# Patient Record
Sex: MALE | Race: WHITE | HISPANIC OR LATINO
[De-identification: names, ages, dates, MRNs, and addresses within clinical notes are randomized per-mention and may not be internally consistent; named-entity substitution may affect disease eponyms.]

---

## 2017-01-12 ENCOUNTER — RX RENEWAL (OUTPATIENT)
Age: 22
End: 2017-01-12

## 2017-04-16 ENCOUNTER — INPATIENT (INPATIENT)
Facility: HOSPITAL | Age: 22
LOS: 1 days | Discharge: HOME | DRG: 202 | End: 2017-04-18
Attending: INTERNAL MEDICINE | Admitting: HOSPITALIST
Payer: COMMERCIAL

## 2017-04-16 VITALS
HEIGHT: 66 IN | DIASTOLIC BLOOD PRESSURE: 75 MMHG | TEMPERATURE: 98 F | WEIGHT: 190.04 LBS | OXYGEN SATURATION: 93 % | SYSTOLIC BLOOD PRESSURE: 140 MMHG | RESPIRATION RATE: 26 BRPM | HEART RATE: 105 BPM

## 2017-04-16 DIAGNOSIS — J45.42 MODERATE PERSISTENT ASTHMA WITH STATUS ASTHMATICUS: ICD-10-CM

## 2017-04-16 DIAGNOSIS — J45.901 UNSPECIFIED ASTHMA WITH (ACUTE) EXACERBATION: ICD-10-CM

## 2017-04-16 DIAGNOSIS — J18.9 PNEUMONIA, UNSPECIFIED ORGANISM: ICD-10-CM

## 2017-04-16 DIAGNOSIS — Z29.9 ENCOUNTER FOR PROPHYLACTIC MEASURES, UNSPECIFIED: ICD-10-CM

## 2017-04-16 DIAGNOSIS — J45.902 UNSPECIFIED ASTHMA WITH STATUS ASTHMATICUS: ICD-10-CM

## 2017-04-16 DIAGNOSIS — E87.2 ACIDOSIS: ICD-10-CM

## 2017-04-16 LAB
ANION GAP SERPL CALC-SCNC: 11 MMOL/L — SIGNIFICANT CHANGE UP (ref 5–17)
BASOPHILS NFR BLD AUTO: 1 % — SIGNIFICANT CHANGE UP (ref 0–2)
BUN SERPL-MCNC: 16 MG/DL — SIGNIFICANT CHANGE UP (ref 7–23)
CALCIUM SERPL-MCNC: 9.3 MG/DL — SIGNIFICANT CHANGE UP (ref 8.5–10.1)
CHLORIDE SERPL-SCNC: 105 MMOL/L — SIGNIFICANT CHANGE UP (ref 96–108)
CO2 SERPL-SCNC: 24 MMOL/L — SIGNIFICANT CHANGE UP (ref 22–31)
CREAT SERPL-MCNC: 1.2 MG/DL — SIGNIFICANT CHANGE UP (ref 0.5–1.3)
GLUCOSE SERPL-MCNC: 117 MG/DL — HIGH (ref 70–99)
HCT VFR BLD CALC: 49 % — SIGNIFICANT CHANGE UP (ref 39–50)
HGB BLD-MCNC: 15.5 G/DL — SIGNIFICANT CHANGE UP (ref 13–17)
LACTATE SERPL-SCNC: 2.9 MMOL/L — HIGH (ref 0.7–2)
LACTATE SERPL-SCNC: 3.1 MMOL/L — HIGH (ref 0.7–2)
LYMPHOCYTES # BLD AUTO: 5 % — LOW (ref 13–44)
MCHC RBC-ENTMCNC: 28.9 PG — SIGNIFICANT CHANGE UP (ref 27–34)
MCHC RBC-ENTMCNC: 31.7 GM/DL — LOW (ref 32–36)
MCV RBC AUTO: 91.1 FL — SIGNIFICANT CHANGE UP (ref 80–100)
MONOCYTES NFR BLD AUTO: 1 % — SIGNIFICANT CHANGE UP (ref 1–9)
NEUTROPHILS NFR BLD AUTO: 89 % — HIGH (ref 43–77)
PLATELET # BLD AUTO: 214 K/UL — SIGNIFICANT CHANGE UP (ref 150–400)
POTASSIUM SERPL-MCNC: 4 MMOL/L — SIGNIFICANT CHANGE UP (ref 3.5–5.3)
POTASSIUM SERPL-SCNC: 4 MMOL/L — SIGNIFICANT CHANGE UP (ref 3.5–5.3)
RBC # BLD: 5.38 M/UL — SIGNIFICANT CHANGE UP (ref 4.2–5.8)
RBC # FLD: 14 % — SIGNIFICANT CHANGE UP (ref 10.3–14.5)
SODIUM SERPL-SCNC: 140 MMOL/L — SIGNIFICANT CHANGE UP (ref 135–145)
WBC # BLD: 15.6 K/UL — HIGH (ref 3.8–10.5)
WBC # FLD AUTO: 15.6 K/UL — HIGH (ref 3.8–10.5)

## 2017-04-16 PROCEDURE — 99223 1ST HOSP IP/OBS HIGH 75: CPT | Mod: AI

## 2017-04-16 PROCEDURE — 71020: CPT | Mod: 26

## 2017-04-16 PROCEDURE — 99285 EMERGENCY DEPT VISIT HI MDM: CPT

## 2017-04-16 RX ORDER — IPRATROPIUM/ALBUTEROL SULFATE 18-103MCG
3 AEROSOL WITH ADAPTER (GRAM) INHALATION ONCE
Qty: 0 | Refills: 0 | Status: COMPLETED | OUTPATIENT
Start: 2017-04-16 | End: 2017-04-16

## 2017-04-16 RX ORDER — AZITHROMYCIN 500 MG/1
500 TABLET, FILM COATED ORAL ONCE
Qty: 0 | Refills: 0 | Status: COMPLETED | OUTPATIENT
Start: 2017-04-16 | End: 2017-04-16

## 2017-04-16 RX ORDER — CEFTRIAXONE 500 MG/1
1 INJECTION, POWDER, FOR SOLUTION INTRAMUSCULAR; INTRAVENOUS ONCE
Qty: 0 | Refills: 0 | Status: COMPLETED | OUTPATIENT
Start: 2017-04-16 | End: 2017-04-16

## 2017-04-16 RX ORDER — MAGNESIUM SULFATE 500 MG/ML
2 VIAL (ML) INJECTION ONCE
Qty: 0 | Refills: 0 | Status: COMPLETED | OUTPATIENT
Start: 2017-04-16 | End: 2017-04-16

## 2017-04-16 RX ORDER — ALBUTEROL 90 UG/1
2.5 AEROSOL, METERED ORAL
Qty: 0 | Refills: 0 | Status: DISCONTINUED | OUTPATIENT
Start: 2017-04-16 | End: 2017-04-18

## 2017-04-16 RX ORDER — SODIUM CHLORIDE 9 MG/ML
1000 INJECTION INTRAMUSCULAR; INTRAVENOUS; SUBCUTANEOUS ONCE
Qty: 0 | Refills: 0 | Status: COMPLETED | OUTPATIENT
Start: 2017-04-16 | End: 2017-04-16

## 2017-04-16 RX ORDER — ALBUTEROL 90 UG/1
1 AEROSOL, METERED ORAL EVERY 4 HOURS
Qty: 0 | Refills: 0 | Status: DISCONTINUED | OUTPATIENT
Start: 2017-04-16 | End: 2017-04-18

## 2017-04-16 RX ORDER — ALBUTEROL 90 UG/1
2.5 AEROSOL, METERED ORAL EVERY 6 HOURS
Qty: 0 | Refills: 0 | Status: DISCONTINUED | OUTPATIENT
Start: 2017-04-16 | End: 2017-04-18

## 2017-04-16 RX ADMIN — Medication 3 MILLILITER(S): at 18:37

## 2017-04-16 RX ADMIN — CEFTRIAXONE 100 GRAM(S): 500 INJECTION, POWDER, FOR SOLUTION INTRAMUSCULAR; INTRAVENOUS at 19:30

## 2017-04-16 RX ADMIN — Medication 125 MILLIGRAM(S): at 19:41

## 2017-04-16 RX ADMIN — Medication 50 GRAM(S): at 19:42

## 2017-04-16 RX ADMIN — AZITHROMYCIN 255 MILLIGRAM(S): 500 TABLET, FILM COATED ORAL at 20:00

## 2017-04-16 RX ADMIN — SODIUM CHLORIDE 1000 MILLILITER(S): 9 INJECTION INTRAMUSCULAR; INTRAVENOUS; SUBCUTANEOUS at 18:49

## 2017-04-16 NOTE — ED PROVIDER NOTE - OBJECTIVE STATEMENT
pt A 22 y/o man with a history of asthma (No history of hospitalizations nor intubations) presents with a  nonproductive cough, fever, wheezing and dyspnea x 1 day. used rescue inhaler pta. + fever. No nausea/vomiting. No chest pain. No abdominal pain. No back pain. No headache. No neck pain. No weakness/dizziness/numbness/tingling. No syncope. No leg pain/swelling. No prolonged travel, No recent hospitalizations/surgeries, No periods of prolonged immobilization, No hx of PE/DVT, No family history of PE/DVT/bleeding disorders. Denies all other risk factors for PE as well. No other complaints.

## 2017-04-16 NOTE — H&P ADULT - NSHPREVIEWOFSYSTEMS_GEN_ALL_CORE
Constitutional: denies fever, chills, general malaise, weight loss, weight gain, diaphoresis   HEENT: denies dry mouth, sore throat, runny nose, photophobia, blurry vision, double vision, discharge, eye pain, difficulty hearing, vertigo, dysphagia, epistaxis, recent dental work    Respiratory: HPI  Cardiovascular: denies CP, palpitations, edema  Gastrointestinal: denies nausea, vomiting, diarrhea, constipation, abdominal pain, melena, hematochezia   Genitourinary: denies dysuria, frequency, urgency, incontinence, hematuria   Skin/Breast: denies rash, hives, itching, masses, hair loss   Musculoskeletal: denies myalgias, arthritis, joint swelling, muscle weakness  Neurologic: denies syncope, LOC, headache, weakness, dizziness, paresthesias, numbness, seizures, confusion, dementia   Psychiatric: denies feeling anxious, depressed, suicidal, homicidal thoughts  Endocrine: denies increased fingerstick glucoses, cold or heat intolerance, polydipsia, polyuria, polyphagia   Hematology/Oncology: denies bruising, tender or enlarged lymph nodes

## 2017-04-16 NOTE — H&P ADULT - NSHPPHYSICALEXAM_GEN_ALL_CORE
General: Well developed, well nourished, tachypneic  HEENT: NCAT, PERRLA, EOMI bl, moist mucous membranes   Neck: Supple, nontender, no mass  Neurology: A&Ox3, nonfocal, CN II-XII grossly intact, sensation intact, no gait abnormalities   Respiratory: scattered wheezing most prominent bilat apices and RML, equal chest expansion no accessory muscle use   CV: Tachy +S1/S2, no murmurs, rubs or gallops  Abdominal: Soft, NT, ND +BSx4  Extremities: No C/C/E, + peripheral pulses  MSK: Normal ROM, no joint erythema or warmth, no joint swelling   Skin: warm, dry, normal color, no rash or abnormal lesions

## 2017-04-16 NOTE — ED PROVIDER NOTE - CHIEF COMPLAINT
The patient is a 21y Male complaining of asthma exacerbation. The patient is a 21 year old man presents with an asthma exacerbation.

## 2017-04-16 NOTE — H&P ADULT - ASSESSMENT
22 YO male with presumed mild intermittent asthma presents with asthma exacerbation with probable concomittant pneumonia. Accepted to ICU.

## 2017-04-16 NOTE — H&P ADULT - NSHPLABSRESULTS_GEN_ALL_CORE
15.5   15.6  )-----------( 214 ( 16 Apr 2017 18:57 )             49.0       04-16    140  |  105  |  16  ----------------------------<  117<H>  4.0   |  24  |  1.20    Ca    9.3      16 Apr 2017 18:57              Lactate Trend  04-16 @ 18:57 Lactate:3.1       prelim Xray PA Lat: [facility] airspace disease       CAPILLARY BLOOD GLUCOSE                              15.5   15.6  )-----------( 214      ( 16 Apr 2017 18:57 )             49.0       04-16    140  |  105  |  16  ----------------------------<  117<H>  4.0   |  24  |  1.20    Ca    9.3      16 Apr 2017 18:57                        Lactate Trend  04-16 @ 18:57 Lactate:3.1             CAPILLARY BLOOD GLUCOSE

## 2017-04-16 NOTE — PROGRESS NOTE ADULT - SUBJECTIVE AND OBJECTIVE BOX
Patient is a 21y old  Male who presents with a chief complaint of     BRIEF HOSPITAL COURSE: 21 year old male PMHx Asthma and seasonal allergies.  Never hospitalized for asthma exacerbation.  Maintained on Advair, Proventil and Allegra.  Followed by Pulmonary - [place] [facility] Dr Tyson.  Presents to ED with Asthma exacerbation which started last night. Reports URI symptoms the day prior.  No other complaints.    Events last 24 hours: Some improvement of symptoms in Ed after high dose of steroid and multiple Nebs and IV Magnesium.  Rx Status asthmaticus will  admit and follow in ICU setting.    PAST MEDICAL & SURGICAL HISTORY:  Eczema  Asthma  No pertinent past medical history  No significant past surgical history      Review of Systems:  CONSTITUTIONAL: No fever, chills, or fatigue  EYES: No eye pain, visual disturbances, or discharge  ENMT:  No difficulty hearing, tinnitus, vertigo; No sinus or throat pain  NECK: No pain or stiffness  RESPIRATORY: No cough, (+) wheezing, No chills or hemoptysis; (+)  shortness of breath  CARDIOVASCULAR: No chest pain, palpitations, dizziness, or leg swelling  GASTROINTESTINAL: No abdominal or epigastric pain. No nausea, vomiting, or hematemesis; No diarrhea or constipation. No melena or hematochezia.  GENITOURINARY: No dysuria, frequency, hematuria, or incontinence  NEUROLOGICAL: No headaches, memory loss, loss of strength, numbness, or tremors  SKIN: No itching, burning, rashes, or lesions   MUSCULOSKELETAL: No joint pain or swelling; No muscle, back, or extremity pain  PSYCHIATRIC: No depression, anxiety, mood swings, or difficulty sleeping      Medications:  ALBUTerol    0.083% 2.5milliGRAM(s) Nebulizer every 2 hours PRN  ALBUTerol    90 MICROgram(s) HFA Inhaler 1Puff(s) Inhalation every 4 hours  methylPREDNISolone sodium succinate Injectable 60milliGRAM(s) IV Push every 6 hours          ICU Vital Signs Last 24 Hrs  T(C): 36.7, Max: 37.3 (04-16 @ 18:44)  T(F): 98, Max: 99.2 (04-16 @ 18:44)  HR: 95 (95 - 106)  BP: 128/80 (128/80 - 140/75)  BP(mean): --  ABP: --  ABP(mean): --  RR: 25 (24 - 26)  SpO2: 95% (93% - 97%)          I&O's Detail        LABS:                        15.5   15.6  )-----------( 214      ( 16 Apr 2017 18:57 )             49.0     04-16    140  |  105  |  16  ----------------------------<  117<H>  4.0   |  24  |  1.20    Ca    9.3      16 Apr 2017 18:57            CAPILLARY BLOOD GLUCOSE        CULTURES:      Physical Examination:    General: Minimal Respiratory distress.  Alert, oriented, interactive, nonfocal    HEENT: Pupils equal, reactive to light.  Symmetric. No JVD, No Rhinorrhea.     PULM: Decreased BS tight sounding with Ok air movement,  + Exp Wheeze R > L,  no significant sputum production    CVS: Regular rate and rhythm, no murmurs, rubs, or gallops    ABD: Soft, nondistended, nontender, normoactive bowel sounds, no masses    EXT: No edema, nontender    SKIN: Warm and well perfused, no rashes noted.    RADIOLOGY:  Clear, No effusion, No consolidations - official read pending       CRITICAL CARE TIME SPENT: 45 minutes

## 2017-04-16 NOTE — H&P ADULT - NSHPSOCIALHISTORY_GEN_ALL_CORE
Patient is adopted  Denies tobacco, alcohol or illicit drug use  Communications major at [employer]

## 2017-04-16 NOTE — ED ADULT NURSE REASSESSMENT NOTE - NS ED NURSE REASSESS COMMENT FT1
received report brandon rodriguez.  patient speaking in 3 word sentences,  retractions observed.  neb tx continued.  md made aware of patient condition.

## 2017-04-16 NOTE — ED PROVIDER NOTE - MEDICAL DECISION MAKING DETAILS
patient Patient received continuous duonebs, Solumedrol, Magnesium Sulfate with mild improvement in the ER. Status asthmaticus with possible pneumonia. Patient is still able to speak in full sentences and is comfortable but still diffusely wheezing. ICU and hospitalist aware. Patient accepted for admission to ICU.

## 2017-04-16 NOTE — H&P ADULT - ATTENDING COMMENTS
patient seen with both parents at bedside. All acknowledging plan and voiced agreement to aforementioned plan.

## 2017-04-16 NOTE — H&P ADULT - HISTORY OF PRESENT ILLNESS
22 YO male PMH asthma ( unknown subtype) presnets to ED with complaints of non productive cough and increasing shortness of breath X 1 day, unrelieved by rescue inhaler. Also reports subjective fever. Denies chills, chest pain, NVD. Patient states long list of seasonal allergies, and unsure if has cold symptoms, as he often gets rhiorrhea and cough with allergies. denies orthopnea. Symptoms unchanges with position nor time of day.   Denies any hospitalizations or intubations in past.     In ED received Duoneb X 4, Magnesium Sulfate X 2, Solumedrol 125mg IV X 1 with some imoptovement.

## 2017-04-17 DIAGNOSIS — B34.1 ENTEROVIRUS INFECTION, UNSPECIFIED: ICD-10-CM

## 2017-04-17 DIAGNOSIS — E83.39 OTHER DISORDERS OF PHOSPHORUS METABOLISM: ICD-10-CM

## 2017-04-17 LAB
ANION GAP SERPL CALC-SCNC: 11 MMOL/L — SIGNIFICANT CHANGE UP (ref 5–17)
BUN SERPL-MCNC: 13 MG/DL — SIGNIFICANT CHANGE UP (ref 7–23)
CALCIUM SERPL-MCNC: 8.5 MG/DL — SIGNIFICANT CHANGE UP (ref 8.5–10.1)
CHLORIDE SERPL-SCNC: 109 MMOL/L — HIGH (ref 96–108)
CO2 SERPL-SCNC: 21 MMOL/L — LOW (ref 22–31)
CREAT SERPL-MCNC: 0.95 MG/DL — SIGNIFICANT CHANGE UP (ref 0.5–1.3)
GLUCOSE SERPL-MCNC: 143 MG/DL — HIGH (ref 70–99)
HCT VFR BLD CALC: 42.9 % — SIGNIFICANT CHANGE UP (ref 39–50)
HGB BLD-MCNC: 14 G/DL — SIGNIFICANT CHANGE UP (ref 13–17)
LACTATE SERPL-SCNC: 2.2 MMOL/L — HIGH (ref 0.7–2)
MAGNESIUM SERPL-MCNC: 2.1 MG/DL — SIGNIFICANT CHANGE UP (ref 1.8–2.4)
MCHC RBC-ENTMCNC: 29.4 PG — SIGNIFICANT CHANGE UP (ref 27–34)
MCHC RBC-ENTMCNC: 32.6 GM/DL — SIGNIFICANT CHANGE UP (ref 32–36)
MCV RBC AUTO: 90.3 FL — SIGNIFICANT CHANGE UP (ref 80–100)
PHOSPHATE SERPL-MCNC: 2.4 MG/DL — LOW (ref 2.5–4.5)
PLATELET # BLD AUTO: 192 K/UL — SIGNIFICANT CHANGE UP (ref 150–400)
POTASSIUM SERPL-MCNC: 3.9 MMOL/L — SIGNIFICANT CHANGE UP (ref 3.5–5.3)
POTASSIUM SERPL-SCNC: 3.9 MMOL/L — SIGNIFICANT CHANGE UP (ref 3.5–5.3)
RAPID RVP RESULT: DETECTED
RBC # BLD: 4.75 M/UL — SIGNIFICANT CHANGE UP (ref 4.2–5.8)
RBC # FLD: 14.3 % — SIGNIFICANT CHANGE UP (ref 10.3–14.5)
RV+EV RNA SPEC QL NAA+PROBE: DETECTED
SODIUM SERPL-SCNC: 141 MMOL/L — SIGNIFICANT CHANGE UP (ref 135–145)
WBC # BLD: 15 K/UL — HIGH (ref 3.8–10.5)
WBC # FLD AUTO: 15 K/UL — HIGH (ref 3.8–10.5)

## 2017-04-17 PROCEDURE — 99233 SBSQ HOSP IP/OBS HIGH 50: CPT

## 2017-04-17 PROCEDURE — 99233 SBSQ HOSP IP/OBS HIGH 50: CPT | Mod: GC

## 2017-04-17 RX ORDER — BUDESONIDE AND FORMOTEROL FUMARATE DIHYDRATE 160; 4.5 UG/1; UG/1
2 AEROSOL RESPIRATORY (INHALATION)
Qty: 0 | Refills: 0 | Status: DISCONTINUED | OUTPATIENT
Start: 2017-04-17 | End: 2017-04-18

## 2017-04-17 RX ORDER — ACETAMINOPHEN 500 MG
650 TABLET ORAL EVERY 6 HOURS
Qty: 0 | Refills: 0 | Status: DISCONTINUED | OUTPATIENT
Start: 2017-04-17 | End: 2017-04-18

## 2017-04-17 RX ORDER — SUMATRIPTAN SUCCINATE 4 MG/.5ML
6 INJECTION, SOLUTION SUBCUTANEOUS ONCE
Qty: 0 | Refills: 0 | Status: DISCONTINUED | OUTPATIENT
Start: 2017-04-17 | End: 2017-04-17

## 2017-04-17 RX ORDER — SODIUM,POTASSIUM PHOSPHATES 278-250MG
1 POWDER IN PACKET (EA) ORAL
Qty: 0 | Refills: 0 | Status: COMPLETED | OUTPATIENT
Start: 2017-04-17 | End: 2017-04-17

## 2017-04-17 RX ORDER — ENOXAPARIN SODIUM 100 MG/ML
40 INJECTION SUBCUTANEOUS DAILY
Qty: 0 | Refills: 0 | Status: DISCONTINUED | OUTPATIENT
Start: 2017-04-17 | End: 2017-04-18

## 2017-04-17 RX ORDER — POTASSIUM PHOSPHATE, MONOBASIC POTASSIUM PHOSPHATE, DIBASIC 236; 224 MG/ML; MG/ML
15 INJECTION, SOLUTION INTRAVENOUS ONCE
Qty: 0 | Refills: 0 | Status: COMPLETED | OUTPATIENT
Start: 2017-04-17 | End: 2017-04-17

## 2017-04-17 RX ADMIN — Medication 60 MILLIGRAM(S): at 00:21

## 2017-04-17 RX ADMIN — Medication 60 MILLIGRAM(S): at 11:25

## 2017-04-17 RX ADMIN — ALBUTEROL 2.5 MILLIGRAM(S): 90 AEROSOL, METERED ORAL at 13:38

## 2017-04-17 RX ADMIN — Medication 650 MILLIGRAM(S): at 22:13

## 2017-04-17 RX ADMIN — POTASSIUM PHOSPHATE, MONOBASIC POTASSIUM PHOSPHATE, DIBASIC 63.75 MILLIMOLE(S): 236; 224 INJECTION, SOLUTION INTRAVENOUS at 10:05

## 2017-04-17 RX ADMIN — Medication 60 MILLIGRAM(S): at 05:43

## 2017-04-17 RX ADMIN — Medication 40 MILLIGRAM(S): at 21:14

## 2017-04-17 RX ADMIN — ENOXAPARIN SODIUM 40 MILLIGRAM(S): 100 INJECTION SUBCUTANEOUS at 13:32

## 2017-04-17 RX ADMIN — Medication 650 MILLIGRAM(S): at 21:37

## 2017-04-17 RX ADMIN — ALBUTEROL 2.5 MILLIGRAM(S): 90 AEROSOL, METERED ORAL at 07:45

## 2017-04-17 RX ADMIN — Medication 1 TABLET(S): at 12:05

## 2017-04-17 RX ADMIN — ALBUTEROL 2.5 MILLIGRAM(S): 90 AEROSOL, METERED ORAL at 19:24

## 2017-04-17 RX ADMIN — Medication 1 TABLET(S): at 17:36

## 2017-04-17 RX ADMIN — ALBUTEROL 2.5 MILLIGRAM(S): 90 AEROSOL, METERED ORAL at 01:34

## 2017-04-17 RX ADMIN — Medication 1 TABLET(S): at 08:58

## 2017-04-17 RX ADMIN — BUDESONIDE AND FORMOTEROL FUMARATE DIHYDRATE 2 PUFF(S): 160; 4.5 AEROSOL RESPIRATORY (INHALATION) at 19:26

## 2017-04-17 NOTE — PROGRESS NOTE ADULT - PROBLEM SELECTOR PLAN 1
likely 2/2 coronavirus   inspiratory stridor 2/2 asthma exacerbation   on solumedrol 40q8h, albuterol bcfiw9s, albuterol inhaler q4h, albuterol nebs prn shortness of breath/wheezing  Plan Per ICU team
Monitor in ICU   Nebs Q2 PRN and IV Steroids SM 60 Q6  Will Check RVP - CXR clear - 1 dose of CAP abx in ED will defer given clear cxr

## 2017-04-17 NOTE — PROGRESS NOTE ADULT - ATTENDING COMMENTS
Plan for transfer to regular floor per icu team   Case discussed with icu team, patient, parents, and

## 2017-04-17 NOTE — PROGRESS NOTE ADULT - ATTENDING COMMENTS
21M PMH Asthma presents with status asthmaticus due to viral URI (rhinovirus), now improved. Also with concurrent vocal cord dysfunction.    - No neuro issues  - HD stable  - Decrease steroids to Solumedrol 40 mg IV q6h, c/w Albuterol nebs q6h and prn, start ICS/LABA  - Replete hypophosphatemia  - Lactic acidosis likely due to albuterol nebs +/- respiratory muscle fatigue, now improved  - No evidence of eosinophilia, will hold off on Singulair for now  - Vocal cord dysfunction with inspiratory wheezing that improved with breathing exercises  - DVT ppx  - Tolerating diet  - Stable kidney function  - Observe off Abx  - Stable for floors with pulse ox monitoring 21M PMH Asthma and SETH s/p mandibular advancement with genioplasty who presents with status asthmaticus due to viral URI (rhinovirus), now improved. Also with concurrent vocal cord dysfunction.    - No neuro issues  - HD stable  - Decrease steroids to Solumedrol 40 mg IV q6h, c/w Albuterol nebs q6h and prn, start ICS/LABA  - Replete hypophosphatemia  - Lactic acidosis likely due to albuterol nebs +/- respiratory muscle fatigue, now improved  - No evidence of eosinophilia, will hold off on Singulair for now  - Vocal cord dysfunction with inspiratory wheezing that improved with breathing exercises  - DVT ppx  - Tolerating diet  - Stable kidney function  - Observe off Abx  - Stable for floors with pulse ox monitoring

## 2017-04-17 NOTE — PROGRESS NOTE ADULT - PROBLEM SELECTOR PLAN 2
enterorhinovirus  supportive care   plan per ICU team
Resulting lactemia - S/P 2L NS will trend in 6hrs  NPO tonight then Regular diet as tolerates

## 2017-04-17 NOTE — PROGRESS NOTE ADULT - ASSESSMENT
20 YO male with presumed mild intermittent asthma presents with asthma exacerbation with probable concomittant pneumonia. Accepted to ICU.

## 2017-04-17 NOTE — PROGRESS NOTE ADULT - ASSESSMENT
21 year old M with PMH mild intermittent asthma presented with cough and SOB x 1 day admitted with status asthmaticus, rhino virus, lactic acidosis.    1. Status asthmaticus: Continue with albuterol nebulizer every 6 hours, and every 2 hours as needed. Continue with proventil inhaler 2 puff every 4 hours and solu-medrol 60mg every 6 hours.   2. Rhino virus: Plan as above, pt saturating >90% room air.   3. Lactic acidosis: Lactate decreasing, continue to trend. Pt recieved 1 NS bolus.   4. Hypophosphatemia: K-phos 3 times a day for one day. Follow up phosphorous level AM. 21 year old M with PMH mild intermittent asthma presented with cough and SOB x 1 day admitted with status asthmaticus, rhino virus, lactic acidosis.    1. Status asthmaticus: Continue with albuterol nebulizer every 6 hours, and every 2 hours as needed. Continue with proventil inhaler 1 puff every 4 hours and solu-medrol decreased to 40mg every 8 hours. Symbicort 160 micrograms added. Pt stable for floors.  2. Rhino virus: Plan as above, pt saturating >90% room air. Continue with nasal cannuli.   3. Lactic acidosis: Lactate decreasing, likely 2/2 beta agonists.   4. Hypophosphatemia: K-phos 3 times a day for one day and potassium phosphate IV 15 millimole x 1. Follow up phosphorous level AM.   5. Prophylaxis: LMWH 40mg daily added for dvt ppx

## 2017-04-17 NOTE — PROGRESS NOTE ADULT - SUBJECTIVE AND OBJECTIVE BOX
24 hour events: Tachycardic on 4/16 22:00 up to 112, RR up to 36, BP max 140/76.     Review of Systems:      T(F): 98.3, Max: 99.2 (04-16 @ 18:44)  HR: 82 (82 - 112)  BP: 108/56 (104/55 - 140/76)  RR: 21 (14 - 36)  SpO2: 91% (89% - 97%)      I&O's Summary    I & Os for current day (as of 17 Apr 2017 07:53)  =============================================  IN: 900 ml / OUT: 550 ml / NET: 350 ml      Physical Exam:     Gen:  Neuro:  HEENT:  Resp:  CVS:  Abd:  Ext:  Skin:    Meds:  methylPREDNISolone sodium succinate Injectable IV Push  ALBUTerol    0.083% Nebulizer PRN  ALBUTerol    90 MICROgram(s) HFA Inhaler Inhalation  ALBUTerol    0.083% Nebulizer  potassium acid phosphate/sodium acid phosphate tablet (K-PHOS No. 2) Oral                            14.0   15.0  )-----------( 192      ( 17 Apr 2017 04:11 )             42.9     Bands 4.0    04-17    141  |  109<H>  |  13  ----------------------------<  143<H>  3.9   |  21<L>  |  0.95    Ca    8.5      17 Apr 2017 04:11  Phos  2.4     04-17  Mg     2.1     04-17      Lactate 2.2           04-17  Lactate 2.9           04-16  Lactate 3.1           04-16       Rapid RVP Result: Detected     Radiology: ***    Bedside lung ultrasound: ***    Bedside ECHO: ***    CENTRAL LINE: N          DATE INSERTED:              REMOVE: Y/N    MAC: N                        DATE INSERTED:              REMOVE: Y/N    A-LINE: N                       DATE INSERTED:              REMOVE: Y/N    GLOBAL ISSUE/BEST PRACTICE:  Analgesia: None  Sedation: None  HOB elevation: yes  Stress ulcer prophylaxis:  VTE prophylaxis: SCD  Glycemic control: None  Nutrition: Regular diet      CODE STATUS: FULL 24 hour events: Tachycardic on 4/16 22:00 up to 112, RR up to 36, BP max 140/76.     Review of Systems:  General: No fever, chills  Cardio: No chest pain, palpitations  Resp: Admits to cough, tightness in chest, improved sob  Abd: No diarrhea/constipation/nausea/vomit  : No dysuria, hematuria      T(F): 98.3, Max: 99.2 (04-16 @ 18:44)  HR: 82 (82 - 112)  BP: 108/56 (104/55 - 140/76)  RR: 21 (14 - 36)  SpO2: 91% (89% - 97%)      I&O's Summary    I & Os for current day (as of 17 Apr 2017 07:53)  =============================================  IN: 900 ml / OUT: 550 ml / NET: 350 ml      Physical Exam:   Gen: NAD, sitting comfortably in chair  Neuro: AAOx3, EOMI  HEENT: NC/AT, conjunctiva clear B/L, EOMI  Resp: Wheezes heard on inspiration throughout   CVS: +s1s2, RRR  Abd: Nontender, nondistended.   Ext: No cyanosis/clubbing/edema B/L LE    Meds:  methylPREDNISolone sodium succinate Injectable IV Push  ALBUTerol    0.083% Nebulizer PRN  ALBUTerol    90 MICROgram(s) HFA Inhaler Inhalation  ALBUTerol    0.083% Nebulizer  potassium acid phosphate/sodium acid phosphate tablet (K-PHOS No. 2) Oral                            14.0   15.0  )-----------( 192      ( 17 Apr 2017 04:11 )             42.9     Bands 4.0    04-17    141  |  109<H>  |  13  ----------------------------<  143<H>  3.9   |  21<L>  |  0.95    Ca    8.5      17 Apr 2017 04:11  Phos  2.4     04-17  Mg     2.1     04-17      Lactate 2.2           04-17  Lactate 2.9           04-16  Lactate 3.1           04-16       Rapid RVP Result: Detected     Radiology: (4/16/17) CXR showed heart size is within normal limits. Minimal interstitial nodular findings are suggested in the left upper outer lung field. Similar processes at left base are also possible.    Bedside lung ultrasound: ***    Bedside ECHO: ***    CENTRAL LINE: N          DATE INSERTED:              REMOVE: Y/N    MAC: N                        DATE INSERTED:              REMOVE: Y/N    A-LINE: N                       DATE INSERTED:              REMOVE: Y/N    GLOBAL ISSUE/BEST PRACTICE:  Analgesia: None  Sedation: None  HOB elevation: yes  VTE prophylaxis: LMWH  Glycemic control: None  Nutrition: Regular diet      CODE STATUS: FULL

## 2017-04-17 NOTE — PROGRESS NOTE ADULT - SUBJECTIVE AND OBJECTIVE BOX
CHIEF COMPLAINT/INTERVAL HISTORY: Patient seen and examined at bedside. Resting comfortably. On NC. Patient still has shortness of breath with exertion and a cough productive of white phlegm. Denies any chest pain, palpitations, nausea, vomiting, or abdominal pain.     REVIEW OF SYSTEMS: shortness of breath with exertion and a cough productive of white phlegm. Denies any chest pain, palpitations, nausea, vomiting, or abdominal pain.     Vital Signs Last 24 Hrs  T(C): 36.8, Max: 37.3 (04-16 @ 18:44)  T(F): 98.2, Max: 99.2 (04-16 @ 18:44)  HR: 90 (82 - 112)  BP: 128/83 (97/70 - 140/76)  BP(mean): 99 (75 - 104)  RR: 30 (14 - 36)  SpO2: 92% (89% - 97%)    PHYSICAL EXAM:  GENERAL: NAD, AT   HEENT: AT, Nc   Chest: expiratory wheezing b/l   CV: S1S2, RRR, no murmur appreciated   GI: soft, +BS, NT/ND  Musculoskeletal: no edema or cyanosis   Neuro: aaox3     LABS:                        14.0   15.0  )-----------( 192      ( 17 Apr 2017 04:11 )             42.9     04-17    141  |  109<H>  |  13  ----------------------------<  143<H>  3.9   |  21<L>  |  0.95    Ca    8.5      17 Apr 2017 04:11  Phos  2.4     04-17  Mg     2.1     04-17      RVP: coronavirus   lactate 2.2      ALBUTerol    0.083% 2.5milliGRAM(s) Nebulizer every 2 hours PRN  ALBUTerol    90 MICROgram(s) HFA Inhaler 1Puff(s) Inhalation every 4 hours  ALBUTerol    0.083% 2.5milliGRAM(s) Nebulizer every 6 hours  potassium acid phosphate/sodium acid phosphate tablet (K-PHOS No. 2) 1Tablet(s) Oral three times a day with meals  methylPREDNISolone sodium succinate Injectable 40milliGRAM(s) IV Push every 8 hours  buDESOnide 160 MICROgram(s)/formoterol 4.5 MICROgram(s) Inhaler 2Puff(s) Inhalation two times a day  enoxaparin Injectable 40milliGRAM(s) SubCutaneous daily

## 2017-04-18 ENCOUNTER — TRANSCRIPTION ENCOUNTER (OUTPATIENT)
Age: 22
End: 2017-04-18

## 2017-04-18 VITALS
RESPIRATION RATE: 20 BRPM | DIASTOLIC BLOOD PRESSURE: 65 MMHG | OXYGEN SATURATION: 95 % | SYSTOLIC BLOOD PRESSURE: 124 MMHG

## 2017-04-18 LAB
ANION GAP SERPL CALC-SCNC: 9 MMOL/L — SIGNIFICANT CHANGE UP (ref 5–17)
BUN SERPL-MCNC: 14 MG/DL — SIGNIFICANT CHANGE UP (ref 7–23)
CALCIUM SERPL-MCNC: 8.2 MG/DL — LOW (ref 8.5–10.1)
CHLORIDE SERPL-SCNC: 107 MMOL/L — SIGNIFICANT CHANGE UP (ref 96–108)
CO2 SERPL-SCNC: 25 MMOL/L — SIGNIFICANT CHANGE UP (ref 22–31)
CREAT SERPL-MCNC: 0.84 MG/DL — SIGNIFICANT CHANGE UP (ref 0.5–1.3)
GLUCOSE SERPL-MCNC: 118 MG/DL — HIGH (ref 70–99)
HCT VFR BLD CALC: 44.1 % — SIGNIFICANT CHANGE UP (ref 39–50)
HGB BLD-MCNC: 13.9 G/DL — SIGNIFICANT CHANGE UP (ref 13–17)
MAGNESIUM SERPL-MCNC: 2.1 MG/DL — SIGNIFICANT CHANGE UP (ref 1.8–2.4)
MCHC RBC-ENTMCNC: 28.8 PG — SIGNIFICANT CHANGE UP (ref 27–34)
MCHC RBC-ENTMCNC: 31.5 GM/DL — LOW (ref 32–36)
MCV RBC AUTO: 91.4 FL — SIGNIFICANT CHANGE UP (ref 80–100)
PHOSPHATE SERPL-MCNC: 4.5 MG/DL — SIGNIFICANT CHANGE UP (ref 2.5–4.5)
PLATELET # BLD AUTO: 202 K/UL — SIGNIFICANT CHANGE UP (ref 150–400)
POTASSIUM SERPL-MCNC: 4.1 MMOL/L — SIGNIFICANT CHANGE UP (ref 3.5–5.3)
POTASSIUM SERPL-SCNC: 4.1 MMOL/L — SIGNIFICANT CHANGE UP (ref 3.5–5.3)
RBC # BLD: 4.82 M/UL — SIGNIFICANT CHANGE UP (ref 4.2–5.8)
RBC # FLD: 14.5 % — SIGNIFICANT CHANGE UP (ref 10.3–14.5)
SODIUM SERPL-SCNC: 141 MMOL/L — SIGNIFICANT CHANGE UP (ref 135–145)
WBC # BLD: 13.5 K/UL — HIGH (ref 3.8–10.5)
WBC # FLD AUTO: 13.5 K/UL — HIGH (ref 3.8–10.5)

## 2017-04-18 PROCEDURE — 94640 AIRWAY INHALATION TREATMENT: CPT

## 2017-04-18 PROCEDURE — 99231 SBSQ HOSP IP/OBS SF/LOW 25: CPT

## 2017-04-18 PROCEDURE — 87040 BLOOD CULTURE FOR BACTERIA: CPT

## 2017-04-18 PROCEDURE — 96372 THER/PROPH/DIAG INJ SC/IM: CPT | Mod: 59

## 2017-04-18 PROCEDURE — 96376 TX/PRO/DX INJ SAME DRUG ADON: CPT

## 2017-04-18 PROCEDURE — 80048 BASIC METABOLIC PNL TOTAL CA: CPT

## 2017-04-18 PROCEDURE — 99239 HOSP IP/OBS DSCHRG MGMT >30: CPT

## 2017-04-18 PROCEDURE — 83735 ASSAY OF MAGNESIUM: CPT

## 2017-04-18 PROCEDURE — 99285 EMERGENCY DEPT VISIT HI MDM: CPT | Mod: 25

## 2017-04-18 PROCEDURE — 87581 M.PNEUMON DNA AMP PROBE: CPT

## 2017-04-18 PROCEDURE — 71046 X-RAY EXAM CHEST 2 VIEWS: CPT

## 2017-04-18 PROCEDURE — 99232 SBSQ HOSP IP/OBS MODERATE 35: CPT | Mod: GC

## 2017-04-18 PROCEDURE — 99221 1ST HOSP IP/OBS SF/LOW 40: CPT

## 2017-04-18 PROCEDURE — 96365 THER/PROPH/DIAG IV INF INIT: CPT

## 2017-04-18 PROCEDURE — 84100 ASSAY OF PHOSPHORUS: CPT

## 2017-04-18 PROCEDURE — 94664 DEMO&/EVAL PT USE INHALER: CPT

## 2017-04-18 PROCEDURE — 87633 RESP VIRUS 12-25 TARGETS: CPT

## 2017-04-18 PROCEDURE — 87486 CHLMYD PNEUM DNA AMP PROBE: CPT

## 2017-04-18 PROCEDURE — 83605 ASSAY OF LACTIC ACID: CPT

## 2017-04-18 PROCEDURE — 87798 DETECT AGENT NOS DNA AMP: CPT

## 2017-04-18 PROCEDURE — 94760 N-INVAS EAR/PLS OXIMETRY 1: CPT

## 2017-04-18 PROCEDURE — 85027 COMPLETE CBC AUTOMATED: CPT

## 2017-04-18 PROCEDURE — 96375 TX/PRO/DX INJ NEW DRUG ADDON: CPT

## 2017-04-18 RX ORDER — ALBUTEROL 90 UG/1
2.5 AEROSOL, METERED ORAL
Qty: 0 | Refills: 0 | COMMUNITY
Start: 2017-04-18

## 2017-04-18 RX ORDER — ALBUTEROL 90 UG/1
1 AEROSOL, METERED ORAL
Qty: 1 | Refills: 0 | OUTPATIENT
Start: 2017-04-18 | End: 2017-05-18

## 2017-04-18 RX ORDER — FLUTICASONE PROPIONATE AND SALMETEROL 50; 250 UG/1; UG/1
1 POWDER ORAL; RESPIRATORY (INHALATION)
Qty: 0 | Refills: 0 | COMMUNITY

## 2017-04-18 RX ORDER — BUDESONIDE AND FORMOTEROL FUMARATE DIHYDRATE 160; 4.5 UG/1; UG/1
2 AEROSOL RESPIRATORY (INHALATION)
Qty: 1 | Refills: 0 | OUTPATIENT
Start: 2017-04-18 | End: 2017-05-18

## 2017-04-18 RX ADMIN — ALBUTEROL 2.5 MILLIGRAM(S): 90 AEROSOL, METERED ORAL at 14:21

## 2017-04-18 RX ADMIN — Medication 40 MILLIGRAM(S): at 05:29

## 2017-04-18 RX ADMIN — ALBUTEROL 2.5 MILLIGRAM(S): 90 AEROSOL, METERED ORAL at 07:58

## 2017-04-18 RX ADMIN — ENOXAPARIN SODIUM 40 MILLIGRAM(S): 100 INJECTION SUBCUTANEOUS at 12:36

## 2017-04-18 RX ADMIN — ALBUTEROL 2.5 MILLIGRAM(S): 90 AEROSOL, METERED ORAL at 01:54

## 2017-04-18 NOTE — DISCHARGE NOTE ADULT - ADDITIONAL INSTRUCTIONS
continue medications as directed  return to ER if you develop chest pain, shortness of breath, palpitations, fever, chills, weakness or worsening of any symptoms.

## 2017-04-18 NOTE — PROGRESS NOTE ADULT - SUBJECTIVE AND OBJECTIVE BOX
24 hour events: NONE    Review of Systems:  Constitutional: No fever, chills, fatigue  Neuro: No headache, numbness, weakness  Resp: Improving, mild cough with sputum. Mild sob, improving. Chest tightness improving.   CVS: No chest pain, palpitations, leg swelling  GI: No abdominal pain, nausea, vomiting, diarrhea   : No dysuria, frequency    T(F): 97.8, Max: 98.5 (04-18 @ 04:01)  HR: 75 (61 - 107)  BP: 128/67 (97/70 - 137/63)  RR: 26 (22 - 33)  SpO2: 94% (89% - 95%)    I&O's Summary    I & Os for current day (as of 18 Apr 2017 08:30)  =============================================  IN: 1050 ml / OUT: 800 ml / NET: 250 ml      Physical Exam:     Gen: NAD, resting comfortably in bed  Neuro: AAOx3  HEENT: NC/AT, EOMI, PERRLA  Resp: B/L mild expiratory wheezing   CVS: +s1s2, RRR  Abd: Soft, nontender, nondistended  Ext: B/L LE no clubbing/cyanosis/edema    Meds:  methylPREDNISolone sodium succinate Injectable IV Push  ALBUTerol    0.083% Nebulizer PRN  ALBUTerol    90 MICROgram(s) HFA Inhaler Inhalation  ALBUTerol    0.083% Nebulizer  buDESOnide 160 MICROgram(s)/formoterol 4.5 MICROgram(s) Inhaler Inhalation  acetaminophen   Tablet. Oral PRN  enoxaparin Injectable SubCutaneous                 13.9   13.5  )-----------( 202      ( 18 Apr 2017 06:14 )             44.1       04-18    141  |  107  |  14  ----------------------------<  118<H>  4.1   |  25  |  0.84    Ca    8.2<L>      18 Apr 2017 06:14  Phos  4.5     04-18  Mg     2.1     04-18      Blood Culture: NGTD 4/17    Rapid RVP Result: Detected 4/16    Radiology: (4/16/17) CXR showed heart size is within normal limits. Minimal interstitial nodular findings are suggested in the left upper outer lung field. Similar processes at left base are also possible.      CENTRAL LINE: N             MAC: N                            A-LINE: N                          GLOBAL ISSUE/BEST PRACTICE:  Analgesia: None  Sedation: None  HOB elevation: yes  VTE prophylaxis: LMWH  Glycemic control: None  Nutrition: Regular diet      CODE STATUS: FULL 24 hour events: NONE, did well overnight, did not require supp O2, improved wheezing and dyspnea    Review of Systems:  Constitutional: No fever, chills, fatigue  Neuro: No headache, numbness, weakness  Resp: Improving, mild cough with sputum. Mild sob, improving. Chest tightness improving.   CVS: No chest pain, palpitations, leg swelling  GI: No abdominal pain, nausea, vomiting, diarrhea   : No dysuria, frequency    T(F): 97.8, Max: 98.5 (04-18 @ 04:01)  HR: 75 (61 - 107)  BP: 128/67 (97/70 - 137/63)  RR: 26 (22 - 33)  SpO2: 94% (89% - 95%)    I&O's Summary    I & Os for current day (as of 18 Apr 2017 08:30)  =============================================  IN: 1050 ml / OUT: 800 ml / NET: 250 ml      Physical Exam:     Gen: NAD, resting comfortably in bed  Neuro: AAOx3  HEENT: NC/AT, EOMI, PERRLA  Resp: B/L mild expiratory wheezing   CVS: +s1s2, RRR  Abd: Soft, nontender, nondistended  Ext: B/L LE no clubbing/cyanosis/edema    Meds:  methylPREDNISolone sodium succinate Injectable IV Push  ALBUTerol    0.083% Nebulizer PRN  ALBUTerol    90 MICROgram(s) HFA Inhaler Inhalation  ALBUTerol    0.083% Nebulizer  buDESOnide 160 MICROgram(s)/formoterol 4.5 MICROgram(s) Inhaler Inhalation  acetaminophen   Tablet. Oral PRN  enoxaparin Injectable SubCutaneous                 13.9   13.5  )-----------( 202      ( 18 Apr 2017 06:14 )             44.1       04-18    141  |  107  |  14  ----------------------------<  118<H>  4.1   |  25  |  0.84    Ca    8.2<L>      18 Apr 2017 06:14  Phos  4.5     04-18  Mg     2.1     04-18      Blood Culture: NGTD 4/17    Rapid RVP Result: Detected 4/16    Radiology: (4/16/17) CXR showed heart size is within normal limits. Minimal interstitial nodular findings are suggested in the left upper outer lung field. Similar processes at left base are also possible.      CENTRAL LINE: N             MAC: N                            A-LINE: N                          GLOBAL ISSUE/BEST PRACTICE:  Analgesia: None  Sedation: None  HOB elevation: yes  VTE prophylaxis: LMWH  Glycemic control: None  Nutrition: Regular diet      CODE STATUS: FULL

## 2017-04-18 NOTE — DISCHARGE NOTE ADULT - CARE PLAN
Principal Discharge DX:	Asthma exacerbation  Goal:	resolution  Instructions for follow-up, activity and diet:	please continue prednisone taper as directed  continue albuterol nebulizer, inhaler, and advair as directed  follow up with your primary care physician and pulmonologist within 3 days   avoid any strenous activities that will worsen your symptoms  Secondary Diagnosis:	Enterovirus infection  Instructions for follow-up, activity and diet:	Entero/rhinovirus   supportive care, plan as above  drink plenty of fluids

## 2017-04-18 NOTE — DISCHARGE NOTE ADULT - CARE PROVIDER_API CALL
Salvatore Tyson), Critical Care Medicine; Pulmonary Disease  [address] [place] [address]  Phone: [phone]  Fax: [phone]    Dr. Valentin  Phone: (   )    -  Fax: (   )    -

## 2017-04-18 NOTE — DISCHARGE NOTE ADULT - PLAN OF CARE
resolution please continue prednisone taper as directed  continue albuterol nebulizer, inhaler, and advair as directed  follow up with your primary care physician and pulmonologist within 3 days   avoid any strenous activities that will worsen your symptoms Entero/rhinovirus   supportive care, plan as above  drink plenty of fluids

## 2017-04-18 NOTE — DISCHARGE NOTE ADULT - PATIENT PORTAL LINK FT
“You can access the [facility] Patient Portal, offered by [facility], by registering with the following website: [email or web address]”

## 2017-04-18 NOTE — DISCHARGE NOTE ADULT - MEDICATION SUMMARY - MEDICATIONS TO CHANGE
I will SWITCH the dose or number of times a day I take the medications listed below when I get home from the hospital:    Advair Diskus 500 mcg-50 mcg inhalation powder  -- 1 puff(s) inhaled 2 times a day

## 2017-04-18 NOTE — PROGRESS NOTE ADULT - ATTENDING COMMENTS
21M PMH Asthma and SETH s/p mandibular advancement with genioplasty presents with status asthmaticus due to viral URI (rhinovirus), now improved. Also with concurrent vocal cord dysfunction.    - No neuro issues  - HD stable  - Decrease steroids to Prednisone 40 mg po daily, change Albuterol nebs to q4h prn, c/w ICS/LABA  - Stable kidney function and lytes  - Lactic acidosis likely due to albuterol nebs +/- respiratory muscle fatigue, now improved  - No evidence of eosinophilia, will hold off on Singulair for now  - Vocal cord dysfunction with inspiratory wheezing that improved with breathing exercises  - DVT ppx  - Tolerating diet  - Stable kidney function  - Observe off Abx  - Stable for floors with pulse ox monitoring 21M PMH Asthma and SETH s/p mandibular advancement with genioplasty presents with status asthmaticus due to viral URI (rhinovirus), now improved. Also with concurrent vocal cord dysfunction.    - No neuro issues  - HD stable  - Decrease steroids to Prednisone 40 mg po daily, change Albuterol nebs to q4h prn, c/w ICS/LABA  - Improved vocal cord dysfunction  - Stable kidney function and lytes  - Tolerating diet  - Observe off Abx  - DVT ppx  - Tolerating diet  - Stable for d/c home with close outpatient pulmonary follow-up

## 2017-04-18 NOTE — PROGRESS NOTE ADULT - ASSESSMENT
21 year old M with PMH asthma presented with cough and SOB x 1 day admitted with status asthmaticus, rhino virus, lactic acidosis.    1. Status asthmaticus: Continue with albuterol nebulizer every 6 hours and every 2 hours as needed. Continue with proventil inhaler 1 puff every 4 hours and solu-medrol decreased to 40mg every 8 hours. Symbicort 160 micrograms added. Pt stable for discharge to floors.  2. Rhino virus: Plan as above, pt saturating >90% room air. Continue with nasal cannuli.   3. Lactic acidosis: Resolved, likely 2/2 beta agonist.   4. Hypophosphatemia: Resolved   5. Prophylaxis: LMWH 40mg daily added for dvt ppx

## 2017-04-18 NOTE — DISCHARGE NOTE ADULT - MEDICATION SUMMARY - MEDICATIONS TO TAKE
I will START or STAY ON the medications listed below when I get home from the hospital:    Pt was admitted to hospital. May return to work/school on 4/24/17.   -- 1  -- Indication: For work/school    predniSONE 10 mg oral tablet  -- 4 tabs once a day x 3 days 3 tab(s) by mouth once a day x 3 days 2 tab(s) by mouth once a day x 3 days1 tab(s) by mouth once a day x 3 days  -- It is very important that you take or use this exactly as directed.  Do not skip doses or discontinue unless directed by your doctor.  Obtain medical advice before taking any non-prescription drugs as some may affect the action of this medication.  Take with food or milk.    -- Indication: For Asthma exacerbation    albuterol 90 mcg/inh inhalation aerosol  -- 1 puff(s) inhaled every 4 hours, As Needed shortness of breath or wheezing (please give 30 day supply)  -- Indication: For Asthma exacerbation    Advair Diskus 250 mcg-50 mcg inhalation powder  -- 1 puff(s) inhaled 2 times a day  -- Indication: For Asthma exacerbation    albuterol 2.5 mg/3 mL (0.083%) inhalation solution  -- 2.5 milligram(s) inhaled every 6 hours, As Needed  -- Indication: For Asthma exacerbation

## 2017-04-20 DIAGNOSIS — B97.10 UNSPECIFIED ENTEROVIRUS AS THE CAUSE OF DISEASES CLASSIFIED ELSEWHERE: ICD-10-CM

## 2017-04-20 DIAGNOSIS — J45.902 UNSPECIFIED ASTHMA WITH STATUS ASTHMATICUS: ICD-10-CM

## 2017-04-20 DIAGNOSIS — L30.9 DERMATITIS, UNSPECIFIED: ICD-10-CM

## 2017-04-20 DIAGNOSIS — E87.2 ACIDOSIS: ICD-10-CM

## 2017-04-20 DIAGNOSIS — E83.39 OTHER DISORDERS OF PHOSPHORUS METABOLISM: ICD-10-CM

## 2017-04-20 DIAGNOSIS — J06.9 ACUTE UPPER RESPIRATORY INFECTION, UNSPECIFIED: ICD-10-CM

## 2017-04-20 DIAGNOSIS — G47.33 OBSTRUCTIVE SLEEP APNEA (ADULT) (PEDIATRIC): ICD-10-CM

## 2017-04-22 LAB
CULTURE RESULTS: SIGNIFICANT CHANGE UP
CULTURE RESULTS: SIGNIFICANT CHANGE UP
SPECIMEN SOURCE: SIGNIFICANT CHANGE UP
SPECIMEN SOURCE: SIGNIFICANT CHANGE UP

## 2017-05-19 ENCOUNTER — APPOINTMENT (OUTPATIENT)
Dept: DERMATOLOGY | Facility: CLINIC | Age: 22
End: 2017-05-19

## 2017-05-19 RX ORDER — HYDROCORTISONE 25 MG/G
2.5 CREAM TOPICAL
Qty: 1 | Refills: 2 | Status: ACTIVE | COMMUNITY
Start: 2017-05-19 | End: 1900-01-01

## 2017-05-19 RX ORDER — ALBUTEROL SULFATE 2.5 MG/3ML
(2.5 MG/3ML) SOLUTION RESPIRATORY (INHALATION)
Qty: 75 | Refills: 0 | Status: ACTIVE | COMMUNITY
Start: 2017-04-16

## 2017-05-19 RX ORDER — TRIAMCINOLONE ACETONIDE 1 MG/G
0.1 CREAM TOPICAL
Qty: 1 | Refills: 2 | Status: ACTIVE | COMMUNITY
Start: 2017-05-19 | End: 1900-01-01

## 2017-05-19 RX ORDER — ALBUTEROL SULFATE 90 UG/1
108 (90 BASE) AEROSOL, METERED RESPIRATORY (INHALATION)
Qty: 8 | Refills: 0 | Status: ACTIVE | COMMUNITY
Start: 2016-08-19

## 2017-06-07 ENCOUNTER — OTHER (OUTPATIENT)
Age: 22
End: 2017-06-07

## 2017-08-15 ENCOUNTER — APPOINTMENT (OUTPATIENT)
Dept: DERMATOLOGY | Facility: CLINIC | Age: 22
End: 2017-08-15
Payer: COMMERCIAL

## 2017-08-15 VITALS — SYSTOLIC BLOOD PRESSURE: 118 MMHG | DIASTOLIC BLOOD PRESSURE: 80 MMHG

## 2017-08-15 PROCEDURE — 99213 OFFICE O/P EST LOW 20 MIN: CPT | Mod: GC

## 2017-11-02 ENCOUNTER — HOSPITAL ENCOUNTER (EMERGENCY)
Dept: HOSPITAL 25 - UCCORT | Age: 22
Discharge: HOME | End: 2017-11-02
Payer: COMMERCIAL

## 2017-11-02 VITALS — DIASTOLIC BLOOD PRESSURE: 74 MMHG | SYSTOLIC BLOOD PRESSURE: 131 MMHG

## 2017-11-02 DIAGNOSIS — H10.32: Primary | ICD-10-CM

## 2017-11-02 DIAGNOSIS — H20.9: ICD-10-CM

## 2017-11-02 PROCEDURE — 99202 OFFICE O/P NEW SF 15 MIN: CPT

## 2017-11-02 PROCEDURE — G0463 HOSPITAL OUTPT CLINIC VISIT: HCPCS

## 2017-11-02 NOTE — UC
Eye Complaint HPI





- HPI Summary


HPI Summary: 





left eye redness x 1 day


+ discharge, photophobia , change in vision 





- History of Current Complaint


Chief Complaint: UCEye


Stated Complaint: LEFT EYE COMPLAINT


Time Seen by Provider: 11/02/17 17:14


Hx Obtained From: Patient


Onset/Duration: Gradual Onset, Lasting Days - 1, Still Present


Timing: Constant


Severity Initially: Moderate


Severity Currently: Moderate


Location of Injury: Conjunctiva


Character: Foreign Body Sensation


Aggravating Factor(s): Nothing


Alleviating Factor(s): Nothing


Associated Signs And Symptoms: Positive: Photophobia, Drainage (Clear), 

Drainage (Purulent), Vision Impairment Left.  Negative: Fever, Swelling





- Allergies/Home Medications


Allergies/Adverse Reactions: 


 Allergies











Allergy/AdvReac Type Severity Reaction Status Date / Time


 


No Known Allergies Allergy   Verified 11/02/17 17:23











Home Medications: 


 Home Medications





Albuterol HFA INHALER* [Ventolin HFA Inhaler*] 2 puff INH Q4H PRN 11/02/17 [

History Confirmed 11/02/17]


Cetirizine* [ZyrTEC 10 MG TAB*] 10 mg PO DAILY 11/02/17 [History Confirmed 11/02 /17]


Fluticasone-Salmeterol 250-50* [Advair Diskus 250-50*] 1 puff INH BID 11/02/17 [

History Confirmed 11/02/17]











PMH/Surg Hx/FS Hx/Imm Hx


Previously Healthy: Yes





- Surgical History


Surgical History: Yes


Surgery Procedure, Year, and Place: MAXILO FACIAL SURGERY, WITH GENIOPLASTY





- Family History


Known Family History: 


   Negative: Diabetes





- Social History


Alcohol Use: Occasionally


Substance Use Type: None


Smoking Status (MU): Never Smoked Tobacco





Review of Systems


Constitutional: Negative


Skin: Negative


Eyes: Blurred Vision, Drainage, Eye Redness, Photophobia


ENT: Negative


Respiratory: Negative


Cardiovascular: Negative


Gastrointestinal: Negative


Is Patient Immunocompromised?: No


All Other Systems Reviewed And Are Negative: Yes





Physical Exam


Triage Information Reviewed: Yes


Appearance: Well-Appearing, No Pain Distress, Well-Nourished


Vital Signs: 


 Initial Vital Signs











Temp  98.6 F   11/02/17 17:24


 


Pulse  73   11/02/17 17:24


 


Resp  16   11/02/17 17:24


 


BP  131/74   11/02/17 17:24


 


Pulse Ox  97   11/02/17 17:24











Vital Signs Reviewed: Yes


Eyes: Positive: Conjunctiva Inflamed, Discharge - left eye, Other: - 

photophobia left eye


ENT: Positive: Normal ENT inspection, Hearing grossly normal, Pharynx normal


Neck: Positive: Supple, Nontender, No Lymphadenopathy


Respiratory: Positive: Chest non-tender, Lungs clear, Normal breath sounds


Cardiovascular: Positive: RRR, No Murmur, Pulses Normal


Skin Exam: Normal





Eye Complaint Course/Dx





- Differential Dx/Diagnosis


Provider Diagnoses: conjunctivitis.  iritis





Discharge





- Discharge Plan


Condition: Stable


Disposition: HOME


Prescriptions: 


Tobramycin/Dexameth OPTH.SUSP* [Tobradex 0.3-0.1%*] 1 drop BOTH EYES Q4H #1 btl


Patient Education Materials:  Iritis (ED), Conjunctivitis (ED)


Additional Instructions: 


follow up in 2 days if not better , sooner if getting worse

## 2017-11-25 ENCOUNTER — INPATIENT (INPATIENT)
Facility: HOSPITAL | Age: 22
LOS: 1 days | Discharge: HOME | DRG: 202 | End: 2017-11-27
Attending: INTERNAL MEDICINE | Admitting: FAMILY MEDICINE
Payer: COMMERCIAL

## 2017-11-25 VITALS
SYSTOLIC BLOOD PRESSURE: 142 MMHG | DIASTOLIC BLOOD PRESSURE: 86 MMHG | OXYGEN SATURATION: 93 % | RESPIRATION RATE: 18 BRPM | HEART RATE: 96 BPM | TEMPERATURE: 98 F | WEIGHT: 190.04 LBS

## 2017-11-25 DIAGNOSIS — M26.52 LIMITED MANDIBULAR RANGE OF MOTION: Chronic | ICD-10-CM

## 2017-11-25 DIAGNOSIS — J96.01 ACUTE RESPIRATORY FAILURE WITH HYPOXIA: ICD-10-CM

## 2017-11-25 DIAGNOSIS — B34.8 OTHER VIRAL INFECTIONS OF UNSPECIFIED SITE: ICD-10-CM

## 2017-11-25 DIAGNOSIS — J06.9 ACUTE UPPER RESPIRATORY INFECTION, UNSPECIFIED: ICD-10-CM

## 2017-11-25 DIAGNOSIS — L30.9 DERMATITIS, UNSPECIFIED: ICD-10-CM

## 2017-11-25 DIAGNOSIS — Z29.9 ENCOUNTER FOR PROPHYLACTIC MEASURES, UNSPECIFIED: ICD-10-CM

## 2017-11-25 DIAGNOSIS — J45.901 UNSPECIFIED ASTHMA WITH (ACUTE) EXACERBATION: ICD-10-CM

## 2017-11-25 DIAGNOSIS — G47.33 OBSTRUCTIVE SLEEP APNEA (ADULT) (PEDIATRIC): ICD-10-CM

## 2017-11-25 LAB
ALBUMIN SERPL ELPH-MCNC: 3.7 G/DL — SIGNIFICANT CHANGE UP (ref 3.3–5)
ALP SERPL-CCNC: 88 U/L — SIGNIFICANT CHANGE UP (ref 40–120)
ALT FLD-CCNC: 42 U/L — SIGNIFICANT CHANGE UP (ref 12–78)
ANION GAP SERPL CALC-SCNC: 10 MMOL/L — SIGNIFICANT CHANGE UP (ref 5–17)
AST SERPL-CCNC: 19 U/L — SIGNIFICANT CHANGE UP (ref 15–37)
BASE EXCESS BLDA CALC-SCNC: 0.5 MMOL/L — SIGNIFICANT CHANGE UP (ref -2–2)
BASOPHILS # BLD AUTO: 0.1 K/UL — SIGNIFICANT CHANGE UP (ref 0–0.2)
BASOPHILS NFR BLD AUTO: 0.9 % — SIGNIFICANT CHANGE UP (ref 0–2)
BILIRUB SERPL-MCNC: 0.4 MG/DL — SIGNIFICANT CHANGE UP (ref 0.2–1.2)
BLOOD GAS COMMENTS ARTERIAL: SIGNIFICANT CHANGE UP
BLOOD GAS COMMENTS ARTERIAL: SIGNIFICANT CHANGE UP
BUN SERPL-MCNC: 17 MG/DL — SIGNIFICANT CHANGE UP (ref 7–23)
CALCIUM SERPL-MCNC: 8.9 MG/DL — SIGNIFICANT CHANGE UP (ref 8.5–10.1)
CHLORIDE SERPL-SCNC: 105 MMOL/L — SIGNIFICANT CHANGE UP (ref 96–108)
CO2 SERPL-SCNC: 22 MMOL/L — SIGNIFICANT CHANGE UP (ref 22–31)
CREAT SERPL-MCNC: 1 MG/DL — SIGNIFICANT CHANGE UP (ref 0.5–1.3)
EOSINOPHIL # BLD AUTO: 0.1 K/UL — SIGNIFICANT CHANGE UP (ref 0–0.5)
EOSINOPHIL NFR BLD AUTO: 1.1 % — SIGNIFICANT CHANGE UP (ref 0–6)
GLUCOSE SERPL-MCNC: 117 MG/DL — HIGH (ref 70–99)
HCO3 BLDA-SCNC: 25 MMOL/L — SIGNIFICANT CHANGE UP (ref 23–27)
HCT VFR BLD CALC: 49.5 % — SIGNIFICANT CHANGE UP (ref 39–50)
HGB BLD-MCNC: 16 G/DL — SIGNIFICANT CHANGE UP (ref 13–17)
HOROWITZ INDEX BLDA+IHG-RTO: 21 — SIGNIFICANT CHANGE UP
IGE SERPL-ACNC: 534 IU/ML — HIGH (ref 0–100)
LYMPHOCYTES # BLD AUTO: 1.4 K/UL — SIGNIFICANT CHANGE UP (ref 1–3.3)
LYMPHOCYTES # BLD AUTO: 10.5 % — LOW (ref 13–44)
MCHC RBC-ENTMCNC: 30.7 PG — SIGNIFICANT CHANGE UP (ref 27–34)
MCHC RBC-ENTMCNC: 32.3 GM/DL — SIGNIFICANT CHANGE UP (ref 32–36)
MCV RBC AUTO: 95.2 FL — SIGNIFICANT CHANGE UP (ref 80–100)
MONOCYTES # BLD AUTO: 0.6 K/UL — SIGNIFICANT CHANGE UP (ref 0–0.9)
MONOCYTES NFR BLD AUTO: 4.5 % — SIGNIFICANT CHANGE UP (ref 1–9)
NEUTROPHILS # BLD AUTO: 11.2 K/UL — HIGH (ref 1.8–7.4)
NEUTROPHILS NFR BLD AUTO: 83.1 % — HIGH (ref 43–77)
PCO2 BLDA: 35 MMHG — SIGNIFICANT CHANGE UP (ref 32–46)
PH BLDA: 7.45 — SIGNIFICANT CHANGE UP (ref 7.35–7.45)
PLATELET # BLD AUTO: 204 K/UL — SIGNIFICANT CHANGE UP (ref 150–400)
PO2 BLDA: 60 MMHG — LOW (ref 74–108)
POTASSIUM SERPL-MCNC: 4.2 MMOL/L — SIGNIFICANT CHANGE UP (ref 3.5–5.3)
POTASSIUM SERPL-SCNC: 4.2 MMOL/L — SIGNIFICANT CHANGE UP (ref 3.5–5.3)
PROT SERPL-MCNC: 7.8 G/DL — SIGNIFICANT CHANGE UP (ref 6–8.3)
RAPID RVP RESULT: DETECTED
RBC # BLD: 5.2 M/UL — SIGNIFICANT CHANGE UP (ref 4.2–5.8)
RBC # FLD: 12.6 % — SIGNIFICANT CHANGE UP (ref 10.3–14.5)
RV+EV RNA SPEC QL NAA+PROBE: DETECTED
SAO2 % BLDA: 92 % — SIGNIFICANT CHANGE UP (ref 92–96)
SODIUM SERPL-SCNC: 137 MMOL/L — SIGNIFICANT CHANGE UP (ref 135–145)
TSH SERPL-MCNC: 0.28 UIU/ML — LOW (ref 0.36–3.74)
WBC # BLD: 13.4 K/UL — HIGH (ref 3.8–10.5)
WBC # FLD AUTO: 13.4 K/UL — HIGH (ref 3.8–10.5)

## 2017-11-25 PROCEDURE — 99291 CRITICAL CARE FIRST HOUR: CPT

## 2017-11-25 PROCEDURE — 93010 ELECTROCARDIOGRAM REPORT: CPT

## 2017-11-25 PROCEDURE — 99285 EMERGENCY DEPT VISIT HI MDM: CPT

## 2017-11-25 PROCEDURE — 99223 1ST HOSP IP/OBS HIGH 75: CPT | Mod: AI,GC

## 2017-11-25 PROCEDURE — 71020: CPT | Mod: 26

## 2017-11-25 RX ORDER — MONTELUKAST 4 MG/1
10 TABLET, CHEWABLE ORAL AT BEDTIME
Qty: 0 | Refills: 0 | Status: DISCONTINUED | OUTPATIENT
Start: 2017-11-25 | End: 2017-11-27

## 2017-11-25 RX ORDER — LORATADINE 10 MG/1
10 TABLET ORAL DAILY
Qty: 0 | Refills: 0 | Status: DISCONTINUED | OUTPATIENT
Start: 2017-11-25 | End: 2017-11-27

## 2017-11-25 RX ORDER — INFLUENZA VIRUS VACCINE 15; 15; 15; 15 UG/.5ML; UG/.5ML; UG/.5ML; UG/.5ML
0.5 SUSPENSION INTRAMUSCULAR ONCE
Qty: 0 | Refills: 0 | Status: COMPLETED | OUTPATIENT
Start: 2017-11-25 | End: 2017-11-27

## 2017-11-25 RX ORDER — IPRATROPIUM/ALBUTEROL SULFATE 18-103MCG
3 AEROSOL WITH ADAPTER (GRAM) INHALATION ONCE
Qty: 0 | Refills: 0 | Status: COMPLETED | OUTPATIENT
Start: 2017-11-25 | End: 2017-11-25

## 2017-11-25 RX ORDER — FLUTICASONE PROPIONATE AND SALMETEROL 50; 250 UG/1; UG/1
1 POWDER ORAL; RESPIRATORY (INHALATION)
Qty: 0 | Refills: 0 | COMMUNITY

## 2017-11-25 RX ORDER — IPRATROPIUM/ALBUTEROL SULFATE 18-103MCG
3 AEROSOL WITH ADAPTER (GRAM) INHALATION EVERY 4 HOURS
Qty: 0 | Refills: 0 | Status: DISCONTINUED | OUTPATIENT
Start: 2017-11-25 | End: 2017-11-26

## 2017-11-25 RX ORDER — BUDESONIDE AND FORMOTEROL FUMARATE DIHYDRATE 160; 4.5 UG/1; UG/1
2 AEROSOL RESPIRATORY (INHALATION)
Qty: 0 | Refills: 0 | Status: DISCONTINUED | OUTPATIENT
Start: 2017-11-25 | End: 2017-11-27

## 2017-11-25 RX ORDER — ACETAMINOPHEN 500 MG
650 TABLET ORAL EVERY 6 HOURS
Qty: 0 | Refills: 0 | Status: DISCONTINUED | OUTPATIENT
Start: 2017-11-25 | End: 2017-11-27

## 2017-11-25 RX ORDER — MAGNESIUM SULFATE 500 MG/ML
1 VIAL (ML) INJECTION ONCE
Qty: 0 | Refills: 0 | Status: COMPLETED | OUTPATIENT
Start: 2017-11-25 | End: 2017-11-25

## 2017-11-25 RX ORDER — SODIUM CHLORIDE 0.65 %
1 AEROSOL, SPRAY (ML) NASAL THREE TIMES A DAY
Qty: 0 | Refills: 0 | Status: DISCONTINUED | OUTPATIENT
Start: 2017-11-25 | End: 2017-11-27

## 2017-11-25 RX ORDER — ALBUTEROL 90 UG/1
1 AEROSOL, METERED ORAL EVERY 4 HOURS
Qty: 0 | Refills: 0 | Status: DISCONTINUED | OUTPATIENT
Start: 2017-11-25 | End: 2017-11-27

## 2017-11-25 RX ORDER — TIOTROPIUM BROMIDE 18 UG/1
1 CAPSULE ORAL; RESPIRATORY (INHALATION) DAILY
Qty: 0 | Refills: 0 | Status: DISCONTINUED | OUTPATIENT
Start: 2017-11-25 | End: 2017-11-27

## 2017-11-25 RX ADMIN — Medication 3 MILLILITER(S): at 19:50

## 2017-11-25 RX ADMIN — Medication 3 MILLILITER(S): at 11:05

## 2017-11-25 RX ADMIN — Medication 100 GRAM(S): at 11:38

## 2017-11-25 RX ADMIN — BUDESONIDE AND FORMOTEROL FUMARATE DIHYDRATE 2 PUFF(S): 160; 4.5 AEROSOL RESPIRATORY (INHALATION) at 19:50

## 2017-11-25 RX ADMIN — Medication 125 MILLIGRAM(S): at 11:38

## 2017-11-25 RX ADMIN — Medication 3 MILLILITER(S): at 22:34

## 2017-11-25 RX ADMIN — Medication 3 MILLILITER(S): at 13:09

## 2017-11-25 RX ADMIN — MONTELUKAST 10 MILLIGRAM(S): 4 TABLET, CHEWABLE ORAL at 22:12

## 2017-11-25 RX ADMIN — Medication 600 MILLIGRAM(S): at 22:13

## 2017-11-25 NOTE — H&P ADULT - NSHPSOCIALHISTORY_GEN_ALL_CORE
Social History/Preventive Medicine:    Marital Status: Single  Occupation: College student  Lives with: parents (adopted)  Ambulates at home: Without assistive devices    Substance Use:  Tobacco Usage: Denies  Alcohol Usage: Denies  Illicit Drug Usage: Denies

## 2017-11-25 NOTE — H&P ADULT - HISTORY OF PRESENT ILLNESS
22 yo M with PMH of Asthma (uses Pro-Air inhaler at home, history of ICU admission for Exacerbation in 2016), Sleep Apnea s/p Mandibular advancement with angioplasty (2016), Eczema presented to the ED with complaints of shortness of breath, wheezing, and cough that started yesterday morning. Onset was sudden and has been worsening throughout the night. Per patient, symptoms are not as severe as when he was admitted to the ICU last year. Attempted using Pro-Air inhaler, Prednisone 40mg PO x1 (leftover at home) at home with no improvement. Currently reporting productive cough with clear phlegm and chest tightness. Denied fever, chills, chest pain, palpitations, abdominal pain, nausea, vomiting, diarrhea, constipation, urinary frequency, urgency, or dysuria, headaches, changes in vision, dizziness, numbness, tingling, recent travel, recent antibiotic use, or sick contacts.    In the ED, patient was hemodynamically stable. EKG pending. CXR showed no active disease. Labs significant for leukocytosis (WBC 13.4), Neut 83.1%. AB.45/35/60/25. RVP positive, Entero/Rhinovirus positive. Received Duonebs x3, Solu-Medrol 125mg IV x1, and Magnesium Sulfate 1 gram IV x1. 22 yo M with PMH of Asthma (uses Pro-Air inhaler at home, history of ICU admission for Exacerbation in 2016, no history of intubations), Sleep Apnea s/p Mandibular advancement with angioplasty (2016), Eczema presented to the ED with complaints of shortness of breath, wheezing, and cough that started yesterday morning. Onset was sudden and has been worsening throughout the night. Per patient, symptoms are not as severe as when he was admitted to the ICU last year. Attempted using Pro-Air inhaler, Prednisone 40mg PO x1 (leftover at home) at home with no improvement. Currently reporting productive cough with clear phlegm and chest tightness. Denied fever, chills, chest pain, palpitations, abdominal pain, nausea, vomiting, diarrhea, constipation, urinary frequency, urgency, or dysuria, headaches, changes in vision, dizziness, numbness, tingling, recent travel, recent antibiotic use, or sick contacts.    In the ED, patient was hemodynamically stable. EKG pending. CXR showed no active disease. Labs significant for leukocytosis (WBC 13.4), Neut 83.1%. AB.45/35/60/25. RVP positive, Entero/Rhinovirus positive. Received Duonebs x3, Solu-Medrol 125mg IV x1, and Magnesium Sulfate 1 gram IV x1.

## 2017-11-25 NOTE — ED ADULT NURSE REASSESSMENT NOTE - NS ED NURSE REASSESS COMMENT FT1
Received patient from Bianca ELMORE. Patient alert and oriented. Vital signs as documented. Patient denies pain. Remains on bedside cardiac monitor. Bed assigned, awaiting report to floor. Safety maintained. Call bell in reach.

## 2017-11-25 NOTE — H&P ADULT - PROBLEM SELECTOR PLAN 3
-SCDs for DVT ppx. -RVP+, Entero/Rhinovirus.  -Labs significant for leukocytosis (WBC 13.4), Neut 83.1%.  -Continue with supportive care - encouraged PO fluids.  -Tylenol prn fever.  -Trend WBC.

## 2017-11-25 NOTE — H&P ADULT - FAMILY HISTORY
No pertinent family history in first degree relatives No pertinent family history in first degree relatives, of note: pt is adopted unknown family history

## 2017-11-25 NOTE — CONSULT NOTE ADULT - PROBLEM SELECTOR RECOMMENDATION 3
IgE high  will start Singulair, Claritin  cont Systemic Steroids  cont NEBS  cont Spiriva and Symbicort  o2 as needed to keep sat > 88 pct  will check TSH, will check Urine Tox  will check CRP   peak flow daily  weight loss recommended  humidified o2

## 2017-11-25 NOTE — ED PROVIDER NOTE - PROGRESS NOTE DETAILS
discussed case with Dr. Davis, will admit.  Dr. Rainey seen patient declining ICU admission, still wheezing b/l

## 2017-11-25 NOTE — H&P ADULT - PROBLEM SELECTOR PLAN 1
-Admit to Tele with continuous pulseox.  -AB.45/35/60/25.   -CXR showed no active disease.  -Received Duonebs x3, Solu-Medrol 125mg IV x1, and Magnesium Sulfate 1 gram IV x1 in ED.  -Will continue Solu-Medrol 60mg IV qd, Duonebs q4.  -Per ICU eval: No indication for ICU admission at this time.  -If symptoms worsen, will have low threshold for BIPAP/repeat ICU evaluation.  -Follow up Dr. Peters (Pulm) recommendations. -Admit to Tele with continuous pulseox.  -AB.45/35/60/25.   -CXR showed no active disease.  -Received Duonebs x3, Solu-Medrol 125mg IV x1, and Magnesium Sulfate 1 gram IV x1 in ED.  -Will continue Solu-Medrol 60mg IV qd, Duonebs q4.  -Per ICU eval: No indication for ICU admission at this time.  -If symptoms worsen, will have low threshold for BIPAP/repeat ICU evaluation.  -Follow up Dr. Peters (Pulm) recommendations.  -Follow up repeat ABG. -Admit to Tele with continuous pulseox.  -AB.45/35/60/25.   -CXR showed no active disease.  -Received Duonebs x3, Solu-Medrol 125mg IV x1, and Magnesium Sulfate 1 gram IV x1 in ED.  -Will continue Solu-Medrol 60mg IV qd, Duonebs q4.  -Per ICU eval: No indication for ICU admission at this time.  -If symptoms worsen, will have low threshold for BIPAP/repeat ICU evaluation.  -Aspiration precautions.  -Follow up Dr. Peters (Pulm) recommendations. -Admit to Tele with continuous pulseox.  -AB.45/35/60/25.   -CXR showed no active disease.  -Received Duonebs x3, Solu-Medrol 125mg IV x1, and Magnesium Sulfate 1 gram IV x1 in ED.  -Will continue Solu-Medrol 60mg IV qd, Duonebs q4.  -Per ICU eval: No indication for ICU admission at this time.  -sec to rhinovirus and acute asthma exacerbation, moderate  -If symptoms worsen, will have low threshold for BIPAP/repeat ICU evaluation, repeat ABG  -Aspiration precautions.  -Follow up Dr. Peters (Pulm) recommendations.

## 2017-11-25 NOTE — H&P ADULT - ASSESSMENT
22 yo M with PMH of Asthma (uses Pro-Air inhaler at home, history of ICU admission for Exacerbation in 2016), Sleep Apnea s/p Mandibular advancement with angioplasty (7/2016), Eczema presented to the ED with complaints of shortness of breath, wheezing, and cough that started yesterday morning. Patient to be admitted to Telemetry for Asthma Exacerbation, Entero/Rhinovirus. 22 yo M with PMH of Asthma (uses Pro-Air inhaler at home, history of ICU admission for Exacerbation in 2016, no history of intubations), Sleep Apnea s/p Mandibular advancement with angioplasty (7/2016), Eczema presented to the ED with complaints of shortness of breath, wheezing, and cough that started yesterday morning. Patient to be admitted to Telemetry for Acute Respiratory Failure with Hypoxia due to Asthma Exacerbation, Entero/Rhinovirus.

## 2017-11-25 NOTE — CONSULT NOTE ADULT - PROBLEM SELECTOR RECOMMENDATION 9
supportive care  mucinex  tylenol as needed  managing ASTHMA  enc PO intake  o2 as needed  ambulate  rest and hydration and nutrition

## 2017-11-25 NOTE — H&P ADULT - ATTENDING COMMENTS
20 yo M with PMH of Asthma (uses Pro-Air inhaler at home, history of ICU admission for Exacerbation in 2016, no history of intubations), Sleep Apnea s/p Mandibular advancement with angioplasty (7/2016), Eczema presented to the ED with complaints of shortness of breath, wheezing, and cough that started yesterday morning. Patient to be admitted to Telemetry for Acute Respiratory Failure with Hypoxia due to Asthma Exacerbation, Entero/Rhinovirus. Plan: cont nebs, apprec pulm recs, monitor on tele with continuous pulse ox, monitor clinical course, cont steroids

## 2017-11-25 NOTE — H&P ADULT - PMH
Asthma  uses Pro-Air inhaler at home, history of ICU admission for Exacerbation in 2016)  Eczema    Sleep apnea  s/p Mandibular advancement with angioplasty (7/2016) Asthma  uses Pro-Air inhaler at home, history of ICU admission for Exacerbation in 2016), no history of intubations  Eczema    Sleep apnea  s/p Mandibular advancement with angioplasty (7/2016)

## 2017-11-25 NOTE — H&P ADULT - NSHPPHYSICALEXAM_GEN_ALL_CORE
Vital Signs Last 24 Hrs  T(C): 36.7 (25 Nov 2017 09:45), Max: 36.7 (25 Nov 2017 09:45)  T(F): 98 (25 Nov 2017 09:45), Max: 98 (25 Nov 2017 09:45)  HR: 88 (25 Nov 2017 13:04) (88 - 96)  BP: 117/63 (25 Nov 2017 13:04) (117/63 - 142/86)  RR: 19 (25 Nov 2017 13:04) (18 - 19)  RR: 20, Retaken at bedside  SpO2: 93% (25 Nov 2017 13:04) (93 - 93%)    Physical Exam:  General: Well developed, well nourished, NAD, able to speak in full sentences without becoming SOB.  HEENT: Moist mucous membranes   Neck: Supple, nontender  Neurology: A&Ox3, nonfocal, CN II-XII grossly intact, sensation intact  Respiratory: Diffuse wheezing B/L  CV: RRR, +S1/S2, no murmurs  Abdominal: Soft, NT, ND +BSx4  Extremities: No LE edema, + peripheral pulses  MSK: Normal ROM, no joint erythema or warmth, no joint swelling   Skin: warm, dry, normal color, no rash or abnormal lesions

## 2017-11-25 NOTE — ED PROVIDER NOTE - OBJECTIVE STATEMENT
20 yo male hx of asthma (with hx of ICU admission for this, no intubation) c/o cough and shortness of breath/wheezing since last night, using his albuterol inhaler every 20 minutes, took 40mg of prednisone last night.  No fever/chills.  PMD Dr. Jean Hanson.  Pulm Dr. Tyson.

## 2017-11-25 NOTE — PATIENT PROFILE ADULT. - PMH
Asthma  uses Pro-Air inhaler at home, history of ICU admission for Exacerbation in 2016), no history of intubations  Eczema    Sleep apnea  s/p Mandibular advancement with angioplasty (7/2016)

## 2017-11-25 NOTE — H&P ADULT - PROBLEM SELECTOR PLAN 2
-RVP+, Entero/Rhinovirus  -Labs significant for leukocytosis (WBC 13.4), Neut 83.1%.  -Continue with supportive care.  -Tylenol prn fever. -RVP+, Entero/Rhinovirus.  -Labs significant for leukocytosis (WBC 13.4), Neut 83.1%.  -Continue with supportive care - encouraged PO fluids.  -Tylenol prn fever.  -Trend WBC. -Plan as above. -moderate  -Plan as above.

## 2017-11-25 NOTE — ED PROVIDER NOTE - MEDICAL DECISION MAKING DETAILS
asthma exacerbation requiring admission, xray negative, seen by ICU, nebs, steroids, magnesium given

## 2017-11-25 NOTE — CONSULT NOTE ADULT - SUBJECTIVE AND OBJECTIVE BOX
Date/Time Patient Seen:  		  Referring MD:   Data Reviewed	       Patient is a 21y old  Male who presents with a chief complaint of Shortness of breath (2017 15:05)      Subjective/HPI  has known hx of asthma, mariana, uri, last asthma flare up more than 1 year ago  never intubated  pt is adopted  no known family hx  pt recently had ENT surgery to correct mandible to treat severe MARIANA, at [facility]  pt has taken mother's prednisone at home for sx of asthma ex.     sees Dr. NATALIE Tyson in the office for Pulmonary Medicine    20 yo M with PMH of Asthma (uses Pro-Air inhaler at home, history of ICU admission for Exacerbation in , no history of intubations), Sleep Apnea s/p Mandibular advancement with angioplasty (2016), Eczema presented to the ED with complaints of shortness of breath, wheezing, and cough that started yesterday morning. Onset was sudden and has been worsening throughout the night. Per patient, symptoms are not as severe as when he was admitted to the ICU last year. Attempted using Pro-Air inhaler, Prednisone 40mg PO x1 (leftover at home) at home with no improvement. Currently reporting productive cough with clear phlegm and chest tightness. Denied fever, chills, chest pain, palpitations, abdominal pain, nausea, vomiting, diarrhea, constipation, urinary frequency, urgency, or dysuria, headaches, changes in vision, dizziness, numbness, tingling, recent travel, recent antibiotic use, or sick contacts.    In the ED, patient was hemodynamically stable. EKG pending. CXR showed no active disease. Labs significant for leukocytosis (WBC 13.4), Neut 83.1%. AB.45/35/60/25. RVP positive, Entero/Rhinovirus positive. Received Duonebs x3, Solu-Medrol 125mg IV x1, and Magnesium Sulfate 1 gram IV x1.     PAST MEDICAL & SURGICAL HISTORY:  Sleep apnea: s/p Mandibular advancement with angioplasty (2016)  Eczema  Asthma: uses Pro-Air inhaler at home, history of ICU admission for Exacerbation in ), no history of intubations  No pertinent past medical history  Limited mandibular range of motion  No significant past surgical history        Medication list         MEDICATIONS  (STANDING):  ALBUTerol    90 MICROgram(s) HFA Inhaler 1 Puff(s) Inhalation every 4 hours  ALBUTerol/ipratropium for Nebulization 3 milliLiter(s) Nebulizer every 4 hours  buDESOnide 160 MICROgram(s)/formoterol 4.5 MICROgram(s) Inhaler 2 Puff(s) Inhalation two times a day  guaiFENesin  milliGRAM(s) Oral every 12 hours  influenza   Vaccine 0.5 milliLiter(s) IntraMuscular once  loratadine 10 milliGRAM(s) Oral daily  montelukast 10 milliGRAM(s) Oral at bedtime  sodium chloride 0.65% Nasal 1 Spray(s) Both Nostrils three times a day  tiotropium 18 MICROgram(s) Capsule 1 Capsule(s) Inhalation daily    MEDICATIONS  (PRN):  acetaminophen   Tablet 650 milliGRAM(s) Oral every 6 hours PRN For Temp greater than 38 C (100.4 F)         Vitals log        ICU Vital Signs Last 24 Hrs  T(C): 36.7 (2017 19:47), Max: 36.7 (2017 09:45)  T(F): 98.1 (2017 19:47), Max: 98.1 (2017 19:47)  HR: 99 (2017 19:47) (88 - 99)  BP: 124/77 (2017 19:47) (117/63 - 142/86)  BP(mean): --  ABP: --  ABP(mean): --  RR: 18 (2017 19:47) (18 - 19)  SpO2: 94% (2017 19:47) (93% - 94%)           Input and Output:  I&O's Detail      Lab Data                        16.0   13.4  )-----------( 204      ( 2017 11:26 )             49.5         137  |  105  |  17  ----------------------------<  117<H>  4.2   |  22  |  1.00    Ca    8.9      2017 11:26    TPro  7.8  /  Alb  3.7  /  TBili  0.4  /  DBili  x   /  AST  19  /  ALT  42  /  AlkPhos  88      ABG - ( 2017 13:10 )  pH: 7.45  /  pCO2: 35    /  pO2: 60    / HCO3: 25    / Base Excess: 0.5   /  SaO2: 92            non smoker  non drinker  lives at home  goes to school away - [employer] for the holidays            Review of Systems	  wheezing  cough  eczema  allergies      Objective     Physical Examination    head at  skin eczema face  heart - s1s2  lungs - dec BS  abd - soft  cn grossly int  moves all extr      Pertinent Lab findings & Imaging      Serrano:  NO   Adequate UO     I&O's Detail           Discussed with:     Cultures:	        Radiology

## 2017-11-25 NOTE — CONSULT NOTE ADULT - SUBJECTIVE AND OBJECTIVE BOX
Patient is a 21y old  Male who presents with a chief complaint of Shortness of breath (25 Nov 2017 15:05)    HPI: 20 y/o male with hx asthma and sleep apnea came with c/o SOB which didn't resolve with frequent use of inhaler and oral steroids. Denies fever, chills, body aches, N, V, D. C/o cough with clear sputum but denies chest pain. He is a college student and thinks he has had a sick contact. His gets an asthmatic attack when the weather gets cold.     Allergies: dust  No Known Drug Allergies    PAST MEDICAL & SURGICAL HISTORY:  Sleep apnea: s/p Mandibular advancement with angioplasty (7/2016)  Eczema  Asthma: uses Pro-Air inhaler at home, history of ICU admission for Exacerbation in 2016)  Limited mandibular range of motion    FAMILY HISTORY:  No pertinent family history in first degree relatives    HOME MEDICATIONS  Advair Diskus 500 mcg-50 mcg inhalation powder: 1 puff(s) inhaled 2 times a day (25 Nov 2017 15:18)  albuterol 2.5 mg/3 mL (0.083%) inhalation solution: 2.5 milligram(s) inhaled every 6 hours, As Needed (25 Nov 2017 15:18)    REVIEW OF SYSTEMS  Constitutional: No fever, chills, fatigue  Neuro: No headache, numbness, weakness  Resp: +cough with clear sputum, wheezing, shortness of breath  CVS: No chest pain, palpitations, leg swelling  GI: No abdominal pain, nausea, vomiting, diarrhea   : No dysuria, frequency, incontinence  Skin: No itching, burning, rashes, or lesions   Msk: No joint pain or swelling  Psych: No depression, anxiety, mood swings    T(F): 98 (11-25-17 @ 09:45), Max: 98 (11-25-17 @ 09:45)  HR: 88 (11-25-17 @ 13:04) (88 - 96)  BP: 117/63 (11-25-17 @ 13:04) (117/63 - 142/86)  RR: 19 (11-25-17 @ 13:04) (18 - 19)  SpO2: 93% (11-25-17 @ 13:04) (93% - 93%)    PHYSICAL EXAM  General:   CNS:   HEENT:   Resp:   CVS:   Abd:   Ext:   Skin:                         16.0   13.4  )-----------( 204      ( 25 Nov 2017 11:26 )             49.5     11-25    137  |  105  |  17  ----------------------------<  117<H>  4.2   |  22  |  1.00    Ca    8.9      25 Nov 2017 11:26    TPro  7.8  /  Alb  3.7  /  TBili  0.4  /  DBili  x   /  AST  19  /  ALT  42  /  AlkPhos  88  11-25    Rapid RVP Result: Detected (11-25 @ 13:13)    Xray Chest 2 Views PA/Lat (11.25.17 @ 11:25)  No active disease.    CODE STATUS: full  [facility] discussion: NIYAH Patient is a 21y old  Male who presents with a chief complaint of Shortness of breath (25 Nov 2017 15:05)    HPI: 20 y/o male with hx asthma and sleep apnea came with c/o SOB which didn't resolve with frequent use of inhaler and oral steroids. Denies fever, chills, body aches, N, V, D. C/o cough with clear sputum but denies chest pain. He is a college student and thinks he has had a sick contact. His gets an asthmatic attack when the weather gets cold.     Allergies: dust  No Known Drug Allergies    PAST MEDICAL & SURGICAL HISTORY:  Sleep apnea: s/p Mandibular advancement with angioplasty (7/2016)  Eczema  Asthma: uses Pro-Air inhaler at home, history of ICU admission for Exacerbation in 2016)  Limited mandibular range of motion    FAMILY HISTORY:  No pertinent family history in first degree relatives    HOME MEDICATIONS  Advair Diskus 500 mcg-50 mcg inhalation powder: 1 puff(s) inhaled 2 times a day (25 Nov 2017 15:18)  albuterol 2.5 mg/3 mL (0.083%) inhalation solution: 2.5 milligram(s) inhaled every 6 hours, As Needed (25 Nov 2017 15:18)    REVIEW OF SYSTEMS  Constitutional: No fever, chills, fatigue  Neuro: No headache, numbness, weakness  Resp: +cough with clear sputum, wheezing, shortness of breath  CVS: No chest pain, palpitations, leg swelling  GI: No abdominal pain, nausea, vomiting, diarrhea   : No dysuria, frequency, incontinence  Skin: No itching, burning, rashes, or lesions   Msk: No joint pain or swelling  Psych: No depression, anxiety, mood swings    T(F): 98 (11-25-17 @ 09:45), Max: 98 (11-25-17 @ 09:45)  HR: 88 (11-25-17 @ 13:04) (88 - 96)  BP: 117/63 (11-25-17 @ 13:04) (117/63 - 142/86)  RR: 19 (11-25-17 @ 13:04) (18 - 19)  SpO2: 93% (11-25-17 @ 13:04) (93% - 93%)    PHYSICAL EXAM  General: young male, NAD  CNS: AAO x 3, no focal deficits  HEENT: PERRL  Resp: no apparent resp distress, able to speak in full sentences, prolonged exp phase with wheezing in upper zones, good air entry  CVS: S1S2, regular  Abd: soft, NT, +BS  Ext: no edema  Skin: warm, not mottled                        16.0   13.4  )-----------( 204      ( 25 Nov 2017 11:26 )             49.5     11-25    137  |  105  |  17  ----------------------------<  117<H>  4.2   |  22  |  1.00    Ca    8.9      25 Nov 2017 11:26    TPro  7.8  /  Alb  3.7  /  TBili  0.4  /  DBili  x   /  AST  19  /  ALT  42  /  AlkPhos  88  11-25    Rapid RVP Result: Detected (11-25 @ 13:13)    Xray Chest 2 Views PA/Lat (11.25.17 @ 11:25)  No active disease.    CODE STATUS: full  [facility] discussion: NIYAH

## 2017-11-25 NOTE — CONSULT NOTE ADULT - ATTENDING COMMENTS
21 M PMHx asthma admitted with asthma exacerbation due to entero/rhinovirus. He is clinically stable. Continue steroids, standing q4 albuterol with q4 prn for shortness of breath. NC O2 to keep SpO2 > 92. OOB. No need for ICU at this time, reconsult prn.

## 2017-11-25 NOTE — ED ADULT NURSE NOTE - OBJECTIVE STATEMENT
pt to er c/o sob pt with hx asthma is alert oriented speech clear resp slightly labored upon arrival wheezes noted skin flushed pt with hx eczema denies pain denies n/v iv started 20 angio right ac given duo neb tx family at bedside with pt

## 2017-11-25 NOTE — PATIENT PROFILE ADULT. - NS PRO OT REFERRAL QUES 1 YN
[facility] - [facility]    Cardiology Progress Note  [facility] [address] [place] [address] Specialists    Jennifer Wiggins Patient Status:  Inpatient    1952 MRN [id number]   Location [facility] 3W/SW Attending Roma Osorio [facility] Day # 7 PCP dysfunction. Is now on oral diuretics and other medications. I believe she is ready for discharge. Have asked Dr. Marco Antonio Toribio to see her as well for outpatient follow-up, possible cardiomems device  She should be followed closely in the heart failure center. no

## 2017-11-26 LAB
ANION GAP SERPL CALC-SCNC: 9 MMOL/L — SIGNIFICANT CHANGE UP (ref 5–17)
BASOPHILS # BLD AUTO: 0.1 K/UL — SIGNIFICANT CHANGE UP (ref 0–0.2)
BASOPHILS NFR BLD AUTO: 0.6 % — SIGNIFICANT CHANGE UP (ref 0–2)
BUN SERPL-MCNC: 15 MG/DL — SIGNIFICANT CHANGE UP (ref 7–23)
CALCIUM SERPL-MCNC: 8.5 MG/DL — SIGNIFICANT CHANGE UP (ref 8.5–10.1)
CHLORIDE SERPL-SCNC: 107 MMOL/L — SIGNIFICANT CHANGE UP (ref 96–108)
CO2 SERPL-SCNC: 25 MMOL/L — SIGNIFICANT CHANGE UP (ref 22–31)
CREAT SERPL-MCNC: 0.89 MG/DL — SIGNIFICANT CHANGE UP (ref 0.5–1.3)
CRP SERPL-MCNC: 0.3 MG/DL — SIGNIFICANT CHANGE UP (ref 0–0.4)
EOSINOPHIL # BLD AUTO: 0 K/UL — SIGNIFICANT CHANGE UP (ref 0–0.5)
EOSINOPHIL NFR BLD AUTO: 0.2 % — SIGNIFICANT CHANGE UP (ref 0–6)
GLUCOSE SERPL-MCNC: 108 MG/DL — HIGH (ref 70–99)
HCT VFR BLD CALC: 43.7 % — SIGNIFICANT CHANGE UP (ref 39–50)
HGB BLD-MCNC: 14.2 G/DL — SIGNIFICANT CHANGE UP (ref 13–17)
LYMPHOCYTES # BLD AUTO: 1.5 K/UL — SIGNIFICANT CHANGE UP (ref 1–3.3)
LYMPHOCYTES # BLD AUTO: 11.2 % — LOW (ref 13–44)
MCHC RBC-ENTMCNC: 30.9 PG — SIGNIFICANT CHANGE UP (ref 27–34)
MCHC RBC-ENTMCNC: 32.6 GM/DL — SIGNIFICANT CHANGE UP (ref 32–36)
MCV RBC AUTO: 95 FL — SIGNIFICANT CHANGE UP (ref 80–100)
MONOCYTES # BLD AUTO: 1.4 K/UL — HIGH (ref 0–0.9)
MONOCYTES NFR BLD AUTO: 10.9 % — HIGH (ref 1–9)
NEUTROPHILS # BLD AUTO: 10 K/UL — HIGH (ref 1.8–7.4)
NEUTROPHILS NFR BLD AUTO: 77.2 % — HIGH (ref 43–77)
PLATELET # BLD AUTO: 176 K/UL — SIGNIFICANT CHANGE UP (ref 150–400)
POTASSIUM SERPL-MCNC: 3.6 MMOL/L — SIGNIFICANT CHANGE UP (ref 3.5–5.3)
POTASSIUM SERPL-SCNC: 3.6 MMOL/L — SIGNIFICANT CHANGE UP (ref 3.5–5.3)
RBC # BLD: 4.6 M/UL — SIGNIFICANT CHANGE UP (ref 4.2–5.8)
RBC # FLD: 12.7 % — SIGNIFICANT CHANGE UP (ref 10.3–14.5)
SODIUM SERPL-SCNC: 141 MMOL/L — SIGNIFICANT CHANGE UP (ref 135–145)
WBC # BLD: 13 K/UL — HIGH (ref 3.8–10.5)
WBC # FLD AUTO: 13 K/UL — HIGH (ref 3.8–10.5)

## 2017-11-26 PROCEDURE — 99233 SBSQ HOSP IP/OBS HIGH 50: CPT

## 2017-11-26 RX ORDER — LANOLIN ALCOHOL/MO/W.PET/CERES
5 CREAM (GRAM) TOPICAL ONCE
Qty: 0 | Refills: 0 | Status: COMPLETED | OUTPATIENT
Start: 2017-11-26 | End: 2017-11-26

## 2017-11-26 RX ORDER — ALBUTEROL 90 UG/1
2.5 AEROSOL, METERED ORAL EVERY 6 HOURS
Qty: 0 | Refills: 0 | Status: DISCONTINUED | OUTPATIENT
Start: 2017-11-26 | End: 2017-11-27

## 2017-11-26 RX ADMIN — LORATADINE 10 MILLIGRAM(S): 10 TABLET ORAL at 11:10

## 2017-11-26 RX ADMIN — ALBUTEROL 2.5 MILLIGRAM(S): 90 AEROSOL, METERED ORAL at 19:38

## 2017-11-26 RX ADMIN — MONTELUKAST 10 MILLIGRAM(S): 4 TABLET, CHEWABLE ORAL at 23:40

## 2017-11-26 RX ADMIN — Medication 1 SPRAY(S): at 14:20

## 2017-11-26 RX ADMIN — Medication 1 SPRAY(S): at 05:21

## 2017-11-26 RX ADMIN — Medication 60 MILLIGRAM(S): at 05:19

## 2017-11-26 RX ADMIN — Medication 3 MILLILITER(S): at 07:19

## 2017-11-26 RX ADMIN — Medication 1 SPRAY(S): at 23:39

## 2017-11-26 RX ADMIN — Medication 30 MILLIGRAM(S): at 17:18

## 2017-11-26 RX ADMIN — Medication 1 SPRAY(S): at 01:56

## 2017-11-26 RX ADMIN — BUDESONIDE AND FORMOTEROL FUMARATE DIHYDRATE 2 PUFF(S): 160; 4.5 AEROSOL RESPIRATORY (INHALATION) at 18:37

## 2017-11-26 RX ADMIN — Medication 5 MILLIGRAM(S): at 01:54

## 2017-11-26 RX ADMIN — Medication 3 MILLILITER(S): at 15:00

## 2017-11-26 RX ADMIN — BUDESONIDE AND FORMOTEROL FUMARATE DIHYDRATE 2 PUFF(S): 160; 4.5 AEROSOL RESPIRATORY (INHALATION) at 05:20

## 2017-11-26 RX ADMIN — Medication 600 MILLIGRAM(S): at 17:18

## 2017-11-26 RX ADMIN — Medication 600 MILLIGRAM(S): at 05:19

## 2017-11-26 NOTE — PROGRESS NOTE ADULT - ASSESSMENT
20yo M with PMH of Asthma (uses Pro-Air inhaler and Advair at home, history of ICU admission for Exacerbation in 2016, no history of intubations), Sleep Apnea s/p Mandibular advancement with angioplasty (7/2016), Eczema presented to the ED with complaints of shortness of breath, wheezing, and cough a/w acute hypoxic respiratory failure due to asthma exacerbation in setting of rhino/Enterovirus infection.

## 2017-11-26 NOTE — PROGRESS NOTE ADULT - SUBJECTIVE AND OBJECTIVE BOX
Date/Time Patient Seen:  		  Referring MD:   Data Reviewed	       Patient is a 21y old  Male who presents with a chief complaint of Shortness of breath (25 Nov 2017 15:05)    in bed  seen and examined  vs and meds reviewed    Subjective/HPI     PAST MEDICAL & SURGICAL HISTORY:  Sleep apnea: s/p Mandibular advancement with angioplasty (7/2016)  Eczema  Asthma: uses Pro-Air inhaler at home, history of ICU admission for Exacerbation in 2016), no history of intubations  No pertinent past medical history  Limited mandibular range of motion  No significant past surgical history        Medication list         MEDICATIONS  (STANDING):  ALBUTerol    90 MICROgram(s) HFA Inhaler 1 Puff(s) Inhalation every 4 hours  ALBUTerol/ipratropium for Nebulization 3 milliLiter(s) Nebulizer every 4 hours  buDESOnide 160 MICROgram(s)/formoterol 4.5 MICROgram(s) Inhaler 2 Puff(s) Inhalation two times a day  guaiFENesin  milliGRAM(s) Oral every 12 hours  influenza   Vaccine 0.5 milliLiter(s) IntraMuscular once  loratadine 10 milliGRAM(s) Oral daily  montelukast 10 milliGRAM(s) Oral at bedtime  predniSONE   Tablet 30 milliGRAM(s) Oral two times a day  sodium chloride 0.65% Nasal 1 Spray(s) Both Nostrils three times a day  tiotropium 18 MICROgram(s) Capsule 1 Capsule(s) Inhalation daily    MEDICATIONS  (PRN):  acetaminophen   Tablet 650 milliGRAM(s) Oral every 6 hours PRN For Temp greater than 38 C (100.4 F)         Vitals log        ICU Vital Signs Last 24 Hrs  T(C): 36.4 (26 Nov 2017 06:49), Max: 37.1 (26 Nov 2017 04:47)  T(F): 97.5 (26 Nov 2017 06:49), Max: 98.7 (26 Nov 2017 04:47)  HR: 74 (26 Nov 2017 07:20) (74 - 101)  BP: 120/74 (26 Nov 2017 06:49) (108/67 - 142/86)  BP(mean): --  ABP: --  ABP(mean): --  RR: 16 (26 Nov 2017 06:49) (16 - 19)  SpO2: 94% (26 Nov 2017 07:20) (92% - 96%)           Input and Output:  I&O's Detail    25 Nov 2017 07:01  -  26 Nov 2017 07:00  --------------------------------------------------------  IN:    Oral Fluid: 300 mL  Total IN: 300 mL    OUT:  Total OUT: 0 mL    Total NET: 300 mL          Lab Data                        14.2   13.0  )-----------( 176      ( 26 Nov 2017 07:25 )             43.7     11-26    141  |  107  |  15  ----------------------------<  108<H>  3.6   |  25  |  0.89    Ca    8.5      26 Nov 2017 07:25    TPro  7.8  /  Alb  3.7  /  TBili  0.4  /  DBili  x   /  AST  19  /  ALT  42  /  AlkPhos  88  11-25    ABG - ( 25 Nov 2017 13:10 )  pH: 7.45  /  pCO2: 35    /  pO2: 60    / HCO3: 25    / Base Excess: 0.5   /  SaO2: 92                      Review of Systems	      Objective     Physical Examination    head at  heart - s1s2  lungs - dec BS  abd - soft      Pertinent Lab findings & Imaging      Zach:  NO   Adequate UO     I&O's Detail    25 Nov 2017 07:01  -  26 Nov 2017 07:00  --------------------------------------------------------  IN:    Oral Fluid: 300 mL  Total IN: 300 mL    OUT:  Total OUT: 0 mL    Total NET: 300 mL               Discussed with:     Cultures:	        Radiology

## 2017-11-26 NOTE — PROGRESS NOTE ADULT - SUBJECTIVE AND OBJECTIVE BOX
Patient is a 21y old  Male who presents with a chief complaint of Shortness of breath (25 Nov 2017 15:05)      INTERVAL HPI/OVERNIGHT EVENTS: Pt states that he is still SOB with wheezing, but improving from yesterday. +cough. Denies fever, chills, CP.     MEDICATIONS  (STANDING):  ALBUTerol    0.083% 2.5 milliGRAM(s) Nebulizer every 6 hours  ALBUTerol    90 MICROgram(s) HFA Inhaler 1 Puff(s) Inhalation every 4 hours  buDESOnide 160 MICROgram(s)/formoterol 4.5 MICROgram(s) Inhaler 2 Puff(s) Inhalation two times a day  guaiFENesin  milliGRAM(s) Oral every 12 hours  influenza   Vaccine 0.5 milliLiter(s) IntraMuscular once  loratadine 10 milliGRAM(s) Oral daily  montelukast 10 milliGRAM(s) Oral at bedtime  predniSONE   Tablet 30 milliGRAM(s) Oral two times a day  sodium chloride 0.65% Nasal 1 Spray(s) Both Nostrils three times a day  tiotropium 18 MICROgram(s) Capsule 1 Capsule(s) Inhalation daily    MEDICATIONS  (PRN):  acetaminophen   Tablet 650 milliGRAM(s) Oral every 6 hours PRN For Temp greater than 38 C (100.4 F)      Allergies    dust (Hives)  No Known Drug Allergies    Intolerances        REVIEW OF SYSTEMS:  CONSTITUTIONAL: No fever or chills  RESPIRATORY: +cough, +wheezing, +shortness of breath  CARDIOVASCULAR: No chest pain, palpitations, or leg swelling  GASTROINTESTINAL: No abd pain, nausea, vomiting, or diarrhea  GENITOURINARY: No dysuria, frequency, or hematuria  NEUROLOGICAL: no focal weakness or dizziness  MUSCULOSKELETAL: no myalgias     Vital Signs Last 24 Hrs  T(C): 36.9  Max: 37.2 (26 Nov 2017 19:30)  T(F): 98.5  Max: 98.9 (26 Nov 2017 19:30)  HR: 82  (63 - 99)  BP: 119/71  (108/67 - 123/69)  BP(mean): --  RR: 16  (16 - 19)  SpO2: 95%  (91% - 96%)    PHYSICAL EXAM:  GENERAL: NAD  HEENT:  EOMI, moist mucous membranes  CHEST/LUNG:  scattered wheezes b/l; moving air well b/l  HEART:  RRR, S1, S2  ABDOMEN:  BS+, soft, nontender, nondistended  EXTREMITIES: no edema, cyanosis, or calf tenderness  NERVOUS SYSTEM: AA&Ox3    LABS:                        14.2   13.0  )-----------( 176      ( 26 Nov 2017 07:25 )             43.7     CBC Full  -  ( 26 Nov 2017 07:25 )  WBC Count : 13.0 K/uL  Hemoglobin : 14.2 g/dL  Hematocrit : 43.7 %  Platelet Count - Automated : 176 K/uL  Mean Cell Volume : 95.0 fl  Mean Cell Hemoglobin : 30.9 pg  Mean Cell Hemoglobin Concentration : 32.6 gm/dL  Auto Neutrophil # : 10.0 K/uL  Auto Lymphocyte # : 1.5 K/uL  Auto Monocyte # : 1.4 K/uL  Auto Eosinophil # : 0.0 K/uL  Auto Basophil # : 0.1 K/uL  Auto Neutrophil % : 77.2 %  Auto Lymphocyte % : 11.2 %  Auto Monocyte % : 10.9 %  Auto Eosinophil % : 0.2 %  Auto Basophil % : 0.6 %    26 Nov 2017 07:25    141    |  107    |  15     ----------------------------<  108    3.6     |  25     |  0.89     Ca    8.5        26 Nov 2017 07:25            RADIOLOGY & ADDITIONAL TESTS:  CXR PA/Lat: clear lungs  Personally reviewed.     Consultant(s) Notes Reviewed:  [x] YES  [ ] NO    Care Discussed with [x] Consultants  [x] Patient  [x] Family  [ ]      [ ] Other; RN  DVT ppx: ICD Patient is a 21y old  Male who presents with a chief complaint of Shortness of breath (25 Nov 2017 15:05)    INTERVAL HPI/OVERNIGHT EVENTS: Pt states that he is still SOB with wheezing, but improving from yesterday. +cough. Denies fever, chills, CP.     MEDICATIONS  (STANDING):  ALBUTerol    0.083% 2.5 milliGRAM(s) Nebulizer every 6 hours  ALBUTerol    90 MICROgram(s) HFA Inhaler 1 Puff(s) Inhalation every 4 hours  buDESOnide 160 MICROgram(s)/formoterol 4.5 MICROgram(s) Inhaler 2 Puff(s) Inhalation two times a day  guaiFENesin  milliGRAM(s) Oral every 12 hours  influenza   Vaccine 0.5 milliLiter(s) IntraMuscular once  loratadine 10 milliGRAM(s) Oral daily  montelukast 10 milliGRAM(s) Oral at bedtime  predniSONE   Tablet 30 milliGRAM(s) Oral two times a day  sodium chloride 0.65% Nasal 1 Spray(s) Both Nostrils three times a day  tiotropium 18 MICROgram(s) Capsule 1 Capsule(s) Inhalation daily    MEDICATIONS  (PRN):  acetaminophen   Tablet 650 milliGRAM(s) Oral every 6 hours PRN For Temp greater than 38 C (100.4 F)      Allergies    dust (Hives)  No Known Drug Allergies    Intolerances        REVIEW OF SYSTEMS:  CONSTITUTIONAL: No fever or chills  RESPIRATORY: +cough, +wheezing, +shortness of breath  CARDIOVASCULAR: No chest pain, palpitations, or leg swelling  GASTROINTESTINAL: No abd pain, nausea, vomiting, or diarrhea  GENITOURINARY: No dysuria, frequency, or hematuria  NEUROLOGICAL: no focal weakness or dizziness  MUSCULOSKELETAL: no myalgias     Vital Signs Last 24 Hrs  T(C): 36.9  Max: 37.2 (26 Nov 2017 19:30)  T(F): 98.5  Max: 98.9 (26 Nov 2017 19:30)  HR: 82  (63 - 99)  BP: 119/71  (108/67 - 123/69)  BP(mean): --  RR: 16  (16 - 19)  SpO2: 95%  (91% - 96%)    PHYSICAL EXAM:  GENERAL: NAD  HEENT:  EOMI, moist mucous membranes  CHEST/LUNG:  scattered wheezes b/l; moving air well b/l  HEART:  RRR, S1, S2  ABDOMEN:  BS+, soft, nontender, nondistended  EXTREMITIES: no edema, cyanosis, or calf tenderness  NERVOUS SYSTEM: AA&Ox3    LABS:                        14.2   13.0  )-----------( 176      ( 26 Nov 2017 07:25 )             43.7     CBC Full  -  ( 26 Nov 2017 07:25 )  WBC Count : 13.0 K/uL  Hemoglobin : 14.2 g/dL  Hematocrit : 43.7 %  Platelet Count - Automated : 176 K/uL  Mean Cell Volume : 95.0 fl  Mean Cell Hemoglobin : 30.9 pg  Mean Cell Hemoglobin Concentration : 32.6 gm/dL  Auto Neutrophil # : 10.0 K/uL  Auto Lymphocyte # : 1.5 K/uL  Auto Monocyte # : 1.4 K/uL  Auto Eosinophil # : 0.0 K/uL  Auto Basophil # : 0.1 K/uL  Auto Neutrophil % : 77.2 %  Auto Lymphocyte % : 11.2 %  Auto Monocyte % : 10.9 %  Auto Eosinophil % : 0.2 %  Auto Basophil % : 0.6 %    26 Nov 2017 07:25    141    |  107    |  15     ----------------------------<  108    3.6     |  25     |  0.89     Ca    8.5        26 Nov 2017 07:25            RADIOLOGY & ADDITIONAL TESTS:  CXR PA/Lat: clear lungs  Personally reviewed.     Consultant(s) Notes Reviewed:  [x] YES  [ ] NO    Care Discussed with [x] Consultants  [x] Patient  [x] Family  [ ]      [ ] Other; RN  DVT ppx: ICD

## 2017-11-27 ENCOUNTER — TRANSCRIPTION ENCOUNTER (OUTPATIENT)
Age: 22
End: 2017-11-27

## 2017-11-27 VITALS
RESPIRATION RATE: 17 BRPM | TEMPERATURE: 98 F | DIASTOLIC BLOOD PRESSURE: 66 MMHG | OXYGEN SATURATION: 93 % | SYSTOLIC BLOOD PRESSURE: 113 MMHG | HEART RATE: 84 BPM

## 2017-11-27 DIAGNOSIS — J45.909 UNSPECIFIED ASTHMA, UNCOMPLICATED: ICD-10-CM

## 2017-11-27 LAB
ANION GAP SERPL CALC-SCNC: 10 MMOL/L — SIGNIFICANT CHANGE UP (ref 5–17)
BUN SERPL-MCNC: 15 MG/DL — SIGNIFICANT CHANGE UP (ref 7–23)
CALCIUM SERPL-MCNC: 8.5 MG/DL — SIGNIFICANT CHANGE UP (ref 8.5–10.1)
CHLORIDE SERPL-SCNC: 105 MMOL/L — SIGNIFICANT CHANGE UP (ref 96–108)
CO2 SERPL-SCNC: 25 MMOL/L — SIGNIFICANT CHANGE UP (ref 22–31)
CREAT SERPL-MCNC: 0.86 MG/DL — SIGNIFICANT CHANGE UP (ref 0.5–1.3)
GLUCOSE SERPL-MCNC: 97 MG/DL — SIGNIFICANT CHANGE UP (ref 70–99)
HCT VFR BLD CALC: 49.3 % — SIGNIFICANT CHANGE UP (ref 39–50)
HGB BLD-MCNC: 15.9 G/DL — SIGNIFICANT CHANGE UP (ref 13–17)
MCHC RBC-ENTMCNC: 30.9 PG — SIGNIFICANT CHANGE UP (ref 27–34)
MCHC RBC-ENTMCNC: 32.3 GM/DL — SIGNIFICANT CHANGE UP (ref 32–36)
MCV RBC AUTO: 95.7 FL — SIGNIFICANT CHANGE UP (ref 80–100)
PLATELET # BLD AUTO: 202 K/UL — SIGNIFICANT CHANGE UP (ref 150–400)
POTASSIUM SERPL-MCNC: 3.7 MMOL/L — SIGNIFICANT CHANGE UP (ref 3.5–5.3)
POTASSIUM SERPL-SCNC: 3.7 MMOL/L — SIGNIFICANT CHANGE UP (ref 3.5–5.3)
RBC # BLD: 5.15 M/UL — SIGNIFICANT CHANGE UP (ref 4.2–5.8)
RBC # FLD: 13 % — SIGNIFICANT CHANGE UP (ref 10.3–14.5)
SODIUM SERPL-SCNC: 140 MMOL/L — SIGNIFICANT CHANGE UP (ref 135–145)
WBC # BLD: 12.7 K/UL — HIGH (ref 3.8–10.5)
WBC # FLD AUTO: 12.7 K/UL — HIGH (ref 3.8–10.5)

## 2017-11-27 PROCEDURE — 99239 HOSP IP/OBS DSCHRG MGMT >30: CPT

## 2017-11-27 PROCEDURE — 86140 C-REACTIVE PROTEIN: CPT

## 2017-11-27 PROCEDURE — 96375 TX/PRO/DX INJ NEW DRUG ADDON: CPT

## 2017-11-27 PROCEDURE — 80053 COMPREHEN METABOLIC PANEL: CPT

## 2017-11-27 PROCEDURE — 87633 RESP VIRUS 12-25 TARGETS: CPT

## 2017-11-27 PROCEDURE — 82785 ASSAY OF IGE: CPT

## 2017-11-27 PROCEDURE — 82803 BLOOD GASES ANY COMBINATION: CPT

## 2017-11-27 PROCEDURE — 93005 ELECTROCARDIOGRAM TRACING: CPT

## 2017-11-27 PROCEDURE — 99285 EMERGENCY DEPT VISIT HI MDM: CPT | Mod: 25

## 2017-11-27 PROCEDURE — 71046 X-RAY EXAM CHEST 2 VIEWS: CPT

## 2017-11-27 PROCEDURE — 87581 M.PNEUMON DNA AMP PROBE: CPT

## 2017-11-27 PROCEDURE — 87486 CHLMYD PNEUM DNA AMP PROBE: CPT

## 2017-11-27 PROCEDURE — 80048 BASIC METABOLIC PNL TOTAL CA: CPT

## 2017-11-27 PROCEDURE — 85027 COMPLETE CBC AUTOMATED: CPT

## 2017-11-27 PROCEDURE — 36600 WITHDRAWAL OF ARTERIAL BLOOD: CPT

## 2017-11-27 PROCEDURE — 96374 THER/PROPH/DIAG INJ IV PUSH: CPT

## 2017-11-27 PROCEDURE — 84443 ASSAY THYROID STIM HORMONE: CPT

## 2017-11-27 PROCEDURE — 94760 N-INVAS EAR/PLS OXIMETRY 1: CPT

## 2017-11-27 PROCEDURE — 87798 DETECT AGENT NOS DNA AMP: CPT

## 2017-11-27 PROCEDURE — 94640 AIRWAY INHALATION TREATMENT: CPT

## 2017-11-27 PROCEDURE — 90686 IIV4 VACC NO PRSV 0.5 ML IM: CPT

## 2017-11-27 RX ORDER — TIOTROPIUM BROMIDE 18 UG/1
1 CAPSULE ORAL; RESPIRATORY (INHALATION)
Qty: 30 | Refills: 0 | OUTPATIENT
Start: 2017-11-27 | End: 2017-12-27

## 2017-11-27 RX ORDER — MONTELUKAST 4 MG/1
1 TABLET, CHEWABLE ORAL
Qty: 30 | Refills: 0 | OUTPATIENT
Start: 2017-11-27 | End: 2017-12-27

## 2017-11-27 RX ORDER — ALBUTEROL 90 UG/1
3 AEROSOL, METERED ORAL
Qty: 40 | Refills: 0 | OUTPATIENT
Start: 2017-11-27 | End: 2017-12-07

## 2017-11-27 RX ADMIN — Medication 1 SPRAY(S): at 14:06

## 2017-11-27 RX ADMIN — Medication 100 MILLIGRAM(S): at 02:57

## 2017-11-27 RX ADMIN — ALBUTEROL 2.5 MILLIGRAM(S): 90 AEROSOL, METERED ORAL at 14:33

## 2017-11-27 RX ADMIN — Medication 600 MILLIGRAM(S): at 07:01

## 2017-11-27 RX ADMIN — ALBUTEROL 2.5 MILLIGRAM(S): 90 AEROSOL, METERED ORAL at 07:52

## 2017-11-27 RX ADMIN — Medication 1 SPRAY(S): at 07:01

## 2017-11-27 RX ADMIN — LORATADINE 10 MILLIGRAM(S): 10 TABLET ORAL at 11:40

## 2017-11-27 RX ADMIN — BUDESONIDE AND FORMOTEROL FUMARATE DIHYDRATE 2 PUFF(S): 160; 4.5 AEROSOL RESPIRATORY (INHALATION) at 08:43

## 2017-11-27 RX ADMIN — INFLUENZA VIRUS VACCINE 0.5 MILLILITER(S): 15; 15; 15; 15 SUSPENSION INTRAMUSCULAR at 16:06

## 2017-11-27 RX ADMIN — ALBUTEROL 2.5 MILLIGRAM(S): 90 AEROSOL, METERED ORAL at 00:34

## 2017-11-27 RX ADMIN — Medication 30 MILLIGRAM(S): at 07:01

## 2017-11-27 NOTE — DISCHARGE NOTE ADULT - MEDICATION SUMMARY - MEDICATIONS TO TAKE
I will START or STAY ON the medications listed below when I get home from the hospital:    predniSONE 20 mg oral tablet  -- 2 tab(s) by mouth once a day for 7 days (starting 11/28/17)  -- It is very important that you take or use this exactly as directed.  Do not skip doses or discontinue unless directed by your doctor.  Obtain medical advice before taking any non-prescription drugs as some may affect the action of this medication.  Take with food or milk.    -- Indication: For Asthma exacerbation    tiotropium 18 mcg inhalation capsule  -- 1 cap(s) inhaled once a day  -- Indication: For Severe asthma    albuterol 90 mcg/inh inhalation aerosol  -- 1 puff(s) inhaled every 4 hours, As Needed shortness of breath or wheezing (please give 30 day supply)  -- Indication: For Asthma    albuterol 2.5 mg/3 mL (0.083%) inhalation solution  -- 2.5 milligram(s) inhaled every 6 hours, As Needed  -- Indication: For Asthma    Advair Diskus 500 mcg-50 mcg inhalation powder  -- 1 puff(s) inhaled 2 times a day  -- Indication: For Asthma    guaiFENesin 600 mg oral tablet, extended release  -- 1 tab(s) by mouth every 12 hours  -- Indication: For Rhinovirus / cough    montelukast 10 mg oral tablet  -- 1 tab(s) by mouth once a day (at bedtime)  -- Indication: For Severe asthma I will START or STAY ON the medications listed below when I get home from the hospital:    predniSONE 20 mg oral tablet  -- 2 tab(s) by mouth once a day for 7 days (starting 11/28/17)  -- It is very important that you take or use this exactly as directed.  Do not skip doses or discontinue unless directed by your doctor.  Obtain medical advice before taking any non-prescription drugs as some may affect the action of this medication.  Take with food or milk.    -- Indication: For Asthma exacerbation    tiotropium 18 mcg inhalation capsule  -- 1 cap(s) inhaled once a day  -- Indication: For Severe asthma    albuterol 90 mcg/inh inhalation aerosol  -- 1 puff(s) inhaled every 4 hours, As Needed shortness of breath or wheezing (please give 30 day supply)  -- Indication: For Asthma    albuterol 2.5 mg/3 mL (0.083%) inhalation solution  -- 2.5 milligram(s) inhaled every 6 hours, As Needed  -- Indication: For Asthma    Advair Diskus 500 mcg-50 mcg inhalation powder  -- 1 puff(s) inhaled 2 times a day  -- Indication: For Asthma    albuterol 2.5 mg/3 mL (0.083%) inhalation solution  -- 3 milliliter(s) inhaled every 6 hours, As Needed for wheezing  -- For inhalation only.  It is very important that you take or use this exactly as directed.  Do not skip doses or discontinue unless directed by your doctor.  Obtain medical advice before taking any non-prescription drugs as some may affect the action of this medication.    -- Indication: For Asthma    guaiFENesin 600 mg oral tablet, extended release  -- 1 tab(s) by mouth every 12 hours  -- Indication: For Rhinovirus / cough    montelukast 10 mg oral tablet  -- 1 tab(s) by mouth once a day (at bedtime)  -- Indication: For Severe asthma

## 2017-11-27 NOTE — PROGRESS NOTE ADULT - PROBLEM SELECTOR PLAN 2
corrected with surgery  sleep hygiene discussed
skin care  moisturizer  local care
-due to rhino/enterovirus infections; see above for plan

## 2017-11-27 NOTE — PROGRESS NOTE ADULT - SUBJECTIVE AND OBJECTIVE BOX
Date/Time Patient Seen:  		  Referring MD:   Data Reviewed	       Patient is a 21y old  Male who presents with a chief complaint of Shortness of breath (25 Nov 2017 15:05)  in bed  seen and examined  vs and meds reviewed  on room air        Subjective/HPI     PAST MEDICAL & SURGICAL HISTORY:  Sleep apnea: s/p Mandibular advancement with angioplasty (7/2016)  Eczema  Asthma: uses Pro-Air inhaler at home, history of ICU admission for Exacerbation in 2016), no history of intubations  No pertinent past medical history  Limited mandibular range of motion  No significant past surgical history        Medication list         MEDICATIONS  (STANDING):  ALBUTerol    0.083% 2.5 milliGRAM(s) Nebulizer every 6 hours  ALBUTerol    90 MICROgram(s) HFA Inhaler 1 Puff(s) Inhalation every 4 hours  buDESOnide 160 MICROgram(s)/formoterol 4.5 MICROgram(s) Inhaler 2 Puff(s) Inhalation two times a day  guaiFENesin  milliGRAM(s) Oral every 12 hours  influenza   Vaccine 0.5 milliLiter(s) IntraMuscular once  loratadine 10 milliGRAM(s) Oral daily  montelukast 10 milliGRAM(s) Oral at bedtime  predniSONE   Tablet 30 milliGRAM(s) Oral two times a day  sodium chloride 0.65% Nasal 1 Spray(s) Both Nostrils three times a day  tiotropium 18 MICROgram(s) Capsule 1 Capsule(s) Inhalation daily    MEDICATIONS  (PRN):  acetaminophen   Tablet 650 milliGRAM(s) Oral every 6 hours PRN For Temp greater than 38 C (100.4 F)  benzonatate 100 milliGRAM(s) Oral daily PRN Cough         Vitals log        ICU Vital Signs Last 24 Hrs  T(C): 36.9 (27 Nov 2017 05:03), Max: 37.2 (26 Nov 2017 19:30)  T(F): 98.5 (27 Nov 2017 05:03), Max: 98.9 (26 Nov 2017 19:30)  HR: 65 (27 Nov 2017 05:03) (63 - 99)  BP: 110/63 (27 Nov 2017 05:03) (110/63 - 123/69)  BP(mean): --  ABP: --  ABP(mean): --  RR: 16 (27 Nov 2017 05:03) (16 - 19)  SpO2: 95% (27 Nov 2017 05:03) (91% - 96%)           Input and Output:  I&O's Detail    25 Nov 2017 07:01  -  26 Nov 2017 07:00  --------------------------------------------------------  IN:    Oral Fluid: 300 mL  Total IN: 300 mL    OUT:  Total OUT: 0 mL    Total NET: 300 mL          Lab Data                        14.2   13.0  )-----------( 176      ( 26 Nov 2017 07:25 )             43.7     11-26    141  |  107  |  15  ----------------------------<  108<H>  3.6   |  25  |  0.89    Ca    8.5      26 Nov 2017 07:25    TPro  7.8  /  Alb  3.7  /  TBili  0.4  /  DBili  x   /  AST  19  /  ALT  42  /  AlkPhos  88  11-25    ABG - ( 25 Nov 2017 13:10 )  pH: 7.45  /  pCO2: 35    /  pO2: 60    / HCO3: 25    / Base Excess: 0.5   /  SaO2: 92                      Review of Systems	      Objective     Physical Examination    head at  heart - s1s2  lungs - dec BS  abd - soft  cn grossly int  moves all extr        Pertinent Lab findings & Imaging      Zach:  NO   Adequate UO     I&O's Detail    25 Nov 2017 07:01  -  26 Nov 2017 07:00  --------------------------------------------------------  IN:    Oral Fluid: 300 mL  Total IN: 300 mL    OUT:  Total OUT: 0 mL    Total NET: 300 mL               Discussed with:     Cultures:	        Radiology

## 2017-11-27 NOTE — DISCHARGE NOTE ADULT - CARE PLAN
Principal Discharge DX:	Asthma exacerbation  Goal:	resolution  Instructions for follow-up, activity and diet:	You had an asthma exacerbation likely from a viral upper airway infection.   Continue prednisone for another 7 days (40mg daily) then stop.   Singulair and Spiriva were added to your regular asthma regimen by the pulmonologist, to help control your asthma such that you need fewer uses of your rescue inhaler in general. Continue using your home Advair. Follow up with your pulmonologist within a week. Consider f/up with an allergist.  Secondary Diagnosis:	Rhinovirus  Instructions for follow-up, activity and diet:	You have a viral upper respiratory infection -- rhinovirus/enterovirus. You may take the Mucinex for another 5 days to help with symptoms. Follow up with your PMD in a week.  Secondary Diagnosis:	Eczema  Instructions for follow-up, activity and diet:	Follow up with your PMD in a week.

## 2017-11-27 NOTE — PROGRESS NOTE ADULT - PROBLEM SELECTOR PLAN 3
prednisone  inhalers  nebs  02 as needed  ambulate  IgE high, will need allergist follow up  dc planning  will assess how pt feels during the day and plan for poss dc in 24 - 48 hrs
s/p surgical correction for SETH
-SCDs for DVT ppx.  -ambulation

## 2017-11-27 NOTE — DISCHARGE NOTE ADULT - PLAN OF CARE
resolution You had an asthma exacerbation likely from a viral upper airway infection.   Continue prednisone for another 7 days (40mg daily) then stop.   Singulair and Spiriva were added to your regular asthma regimen by the pulmonologist, to help control your asthma such that you need fewer uses of your rescue inhaler in general. Continue using your home Advair. Follow up with your pulmonologist within a week. Consider f/up with an allergist. You have a viral upper respiratory infection -- rhinovirus/enterovirus. You may take the Mucinex for another 5 days to help with symptoms. Follow up with your PMD in a week. Follow up with your PMD in a week.

## 2017-11-27 NOTE — PROGRESS NOTE ADULT - PROBLEM SELECTOR PLAN 1
supportive care  RVP noted  cough regimen  enc PO intake  rest
supportive care  cough regimen
-admission ABG showing pO2 of 60 on room air.   -CXR showed no active disease.  -now resolving with treatment of asthma exacerbation.   -steroids changed to prednisone 30mg po q12h, albuterol nebs q5  -c/w singulair, symbicort  -Per ICU eval: No indication for ICU admission at this time.  -sec to rhinovirus/Enterovirus and acute asthma exacerbation, moderate  -Follow up Dr. Peters (Pulm) recommendations.

## 2017-11-27 NOTE — DISCHARGE NOTE ADULT - CARE PROVIDER_API CALL
Dr. Miguel,   PMD  Phone: (   )    -  Fax: (   )    -    Salvatore Tyson), Critical Care Medicine; Pulmonary Disease  [address] [place] [address]  Phone: [phone]  Fax: [phone]

## 2017-11-27 NOTE — DISCHARGE NOTE ADULT - HOSPITAL COURSE
HPI: HPI:   20 yo M with PMH of Asthma (uses Pro-Air inhaler at home, history of ICU admission for Exacerbation in 2016, no history of intubations), Sleep Apnea s/p Mandibular advancement with angioplasty (2016), Eczema presented to the ED with complaints of shortness of breath, wheezing, and cough that started yesterday morning. Onset was sudden and has been worsening throughout the night. Per patient, symptoms are not as severe as when he was admitted to the ICU last year. Attempted using Pro-Air inhaler, Prednisone 40mg PO x1 (leftover at home) at home with no improvement. Currently reporting productive cough with clear phlegm and chest tightness. Denied fever, chills, chest pain, palpitations, abdominal pain, nausea, vomiting, diarrhea, constipation, urinary frequency, urgency, or dysuria, headaches, changes in vision, dizziness, numbness, tingling, recent travel, recent antibiotic use, or sick contacts.    Hospital Course:  In the ED, patient found to be hypoxic. CXR showed no active disease. Labs significant for leukocytosis (WBC 13.4), Neut 83.1%. AB.45/35/pO2: 60 /25. RVP positive for Entero/Rhinovirus. Received Duonebs x3, Solu-Medrol 125mg IV x1, and Magnesium Sulfate 1 gram IV x1.  Pt admitted with acute hypoxic respiratory failure due to asthma exacerbation likely triggered by viral infection.   Pulkaran Elizabeth) followed the case and tapered down to po steroids. Added spiriva, singulair to pt's regular regimen which included Advair and rescue inhaler.   Pt improved and hypoxia resolved. Wheezing resolved on the regimen. VS stable.   Pt discharged to home and will f/up with his PMD and pulmonologist within a week. Given 1 week of prednisone as per pulm recs.     On day of discharge:  ROS: SOB and wheezing much improved. Able to walk comfortably on room air. Still with dry cough. Denies fever, chills, CP.  Vital Signs Last 24 Hrs  T(C): 36.6 (2017 15:25), Max: 36.8 (2017 08:11)  T(F): 97.8 (2017 15:25), Max: 98.2 (2017 08:11)  HR: 84 (2017 15:25) (62 - 84)  BP: 113/66 (2017 15:25) (102/64 - 113/66)  BP(mean): --  RR: 17 (2017 15:25) (16 - 18)  SpO2: 93% (2017 15:25) (93% - 95%) on RA    PHYSICAL EXAM:  GENERAL: NAD  HEENT:  EOMI, moist mucous membranes  CHEST/LUNG:  CTA b/l, no wheezes or rales; moving air well b/l  HEART:  RRR, S1, S2  ABDOMEN:  BS+, soft, nontender, nondistended  EXTREMITIES: no edema, cyanosis, or calf tenderness  NERVOUS SYSTEM: AA&Ox3    Called PMD's office and left message regarding discharge.   Time spent: 45min

## 2017-11-27 NOTE — DISCHARGE NOTE ADULT - PATIENT PORTAL LINK FT
“You can access the [facility] Patient Portal, offered by [facility], by registering with the following website: [email or web address]”

## 2017-11-27 NOTE — PROGRESS NOTE ADULT - PROBLEM SELECTOR PROBLEM 1
URI (upper respiratory infection)
URI (upper respiratory infection)
Acute respiratory failure with hypoxia

## 2018-04-02 NOTE — ED ADULT TRIAGE NOTE - NS ED NURSE DIRECT TO ROOM YN
Initial / Assessment/Plan of Care Note     Baseline Assessment  89 year old admitted 3/30/2018 as Inpatient with a diagnosis of shortness of breath.   Prior to admission patient was living with Alone and residing at Apartment.  Patient does not  have a Power of  for Healthcare.  Document is not activated.  Agent is Keith Masters.  Patient’s Primary Care Provider is Brian Lopez MD.     Medical History  Past Medical History:   Diagnosis Date   • Allergy     see allergies   • Blood clot associated with vein wall inflammation     DVT rt leg   • CAD (coronary artery disease)    • Celiac sprue    • Cerebral infarction (CMS/formerly [facility])     TIA   • Chronic diarrhea    • Chronic renal insufficiency    • COPD (chronic obstructive pulmonary disease) (CMS/formerly [facility])     Spiriva causes cough, cannot tolerate Spiriva/ Serevent (stomache upset)   • Crohn's disease (CMS/formerly [facility])    • Diastolic dysfunction    • Diverticulosis    • DJD (degenerative joint disease)    • DNR (do not resuscitate)    • GERD (gastroesophageal reflux disease)    • Glaucoma suspect 3/19/2015   • Hard of hearing    • History of CHF (congestive heart failure)    • History of MI (myocardial infarction)    • History of pancreatitis    • History of pleural effusion    • History of small bowel obstruction    • History of subdural hematoma (post traumatic)    • History of TIA (transient ischemic attack)    • Hyperlipidemia    • Hypertension    • Iron deficiency anemia    • Left carotid bruit 10/10/2014   • LVH (left ventricular hypertrophy)    • MRSA (methicillin resistant staph aureus) culture positive 5/16/14 cellulitis left 2nd toe+   • NSTEMI (non-ST elevated myocardial infarction) (CMS/formerly [facility]) 11/16/2013   • Osteoporosis 9/20/2005   • PAF (paroxysmal atrial fibrillation) (CMS/formerly [facility]) June 2014   • Pancreatic insufficiency    • PUD (peptic ulcer disease)    • PVD (peripheral vascular disease) (CMS/formerly [facility])    • Schatzki's ring    • SIADH (syndrome of inappropriate ADH  production) (CMS/[facility]) 6/27/2014   • Stasis leg ulcer (CMS/[facility])    • Swallowing difficulty 1/15/2015    food gets \"stuck\" declines EDG   • Syncope and collapse declines pacemaker, valve surgery   • Thyroid disease    • Tricuspid regurgitation mod/sever on Echocardiogram.   • Urinary incontinence    • Vaccine refused by patient Zostivax   • Vitamin B12 deficiency    • Volvulus of colon (CMS/[facility])    • Weight loss Ensure twice daily   • White coat hypertension        Prior to Admission Status  Functional Status  Ambulation: Independent/Self, Walker  Bathing: Independent/Self  Dressing: Independent/Self  Toileting: Independent/Self  Meal Preparation: Independent/Self  Shopping: Independent/Self, Homemaker  Housekeeping: Homemaker  Transportation: Other (comment), Family ([employer])    Agency/Support  Type of Services Prior to Hospitalization: Housekeeping, Transportation services, Community agency/program  Support Systems: Children  Home Devices/Equipment: Mobility assist device  Mobility Assist Devices: Cane, Standard walker  Sensory Support Devices: Hearing aids, Eyeglasses    Current Status  PT Ambulation Tips:    PT Transfer Tips:    OT Bathing Tips:    OT Dressing Tips:    OT Toileting Tips:    OT Feeding Tips:    SLP Swallow/Feeding Tips:    SLP Comm/Cog Tips:    Current Mental Status: Cooperative, Pleasant  Stressors:      Insurance  Primary: [employer] MEDICARE  Secondary: N/A    Barriers to Discharge  Identified Barriers to Discharge/Transition Planning: Assessment/stabilization in progress, PT/OT clearance    Progress Note  SW trigger received for home with new home care services.  CHARITO reviewed the electronic record and met with the pt.  She lives alone in her own apartment.  She has a supportive and involved family.  She has [employer] for transportation to appointments and the grocery store.  An [employer] caregiver calls weekly for a friendly visit.  The pt believes that her MD is sending in a weekly  caregiver but she isn't sure from which agency.  The pt's goal is to go home when she is discharge from the hospital.  The pt has had [facility] in the past and is not interested in home therapy at this time.  The pt is short of breath during assessment. SW will monitor for discharge needs.    Plan  SW/CM - Recommendations for Discharge:  Home  PT - Recommendations for Discharge:    OT - Recommendations for Discharge:    SLP - Recommendations for Discharge:    Anticipate patient will need post-hospital services. Necessary services are available.  Anticipate patient can return to the environment from which patient entered the hospital.   Anticipate patient can provide self-care at discharge.    Refer to /CM Flowsheet for Goals and objective data.      No

## 2018-07-27 ENCOUNTER — OUTPATIENT (OUTPATIENT)
Dept: OUTPATIENT SERVICES | Facility: HOSPITAL | Age: 23
LOS: 1 days | End: 2018-07-27

## 2018-07-27 VITALS
SYSTOLIC BLOOD PRESSURE: 130 MMHG | RESPIRATION RATE: 16 BRPM | HEIGHT: 66 IN | WEIGHT: 212.08 LBS | DIASTOLIC BLOOD PRESSURE: 86 MMHG | OXYGEN SATURATION: 98 % | HEART RATE: 97 BPM | TEMPERATURE: 98 F

## 2018-07-27 DIAGNOSIS — I05.9 RHEUMATIC MITRAL VALVE DISEASE, UNSPECIFIED: ICD-10-CM

## 2018-07-27 DIAGNOSIS — J45.909 UNSPECIFIED ASTHMA, UNCOMPLICATED: ICD-10-CM

## 2018-07-27 DIAGNOSIS — G47.33 OBSTRUCTIVE SLEEP APNEA (ADULT) (PEDIATRIC): Chronic | ICD-10-CM

## 2018-07-27 DIAGNOSIS — L20.9 ATOPIC DERMATITIS, UNSPECIFIED: ICD-10-CM

## 2018-07-27 DIAGNOSIS — J34.2 DEVIATED NASAL SEPTUM: ICD-10-CM

## 2018-07-27 DIAGNOSIS — M26.52 LIMITED MANDIBULAR RANGE OF MOTION: Chronic | ICD-10-CM

## 2018-07-27 DIAGNOSIS — G47.30 SLEEP APNEA, UNSPECIFIED: ICD-10-CM

## 2018-07-27 LAB
BUN SERPL-MCNC: 24 MG/DL — HIGH (ref 7–23)
CALCIUM SERPL-MCNC: 9 MG/DL — SIGNIFICANT CHANGE UP (ref 8.4–10.5)
CHLORIDE SERPL-SCNC: 102 MMOL/L — SIGNIFICANT CHANGE UP (ref 98–107)
CO2 SERPL-SCNC: 26 MMOL/L — SIGNIFICANT CHANGE UP (ref 22–31)
CREAT SERPL-MCNC: 1.23 MG/DL — SIGNIFICANT CHANGE UP (ref 0.5–1.3)
GLUCOSE SERPL-MCNC: 67 MG/DL — LOW (ref 70–99)
HCT VFR BLD CALC: 47.6 % — SIGNIFICANT CHANGE UP (ref 39–50)
HGB BLD-MCNC: 15.6 G/DL — SIGNIFICANT CHANGE UP (ref 13–17)
MCHC RBC-ENTMCNC: 30.5 PG — SIGNIFICANT CHANGE UP (ref 27–34)
MCHC RBC-ENTMCNC: 32.8 % — SIGNIFICANT CHANGE UP (ref 32–36)
MCV RBC AUTO: 93 FL — SIGNIFICANT CHANGE UP (ref 80–100)
NRBC # FLD: 0 — SIGNIFICANT CHANGE UP
PLATELET # BLD AUTO: 214 K/UL — SIGNIFICANT CHANGE UP (ref 150–400)
PMV BLD: 10.3 FL — SIGNIFICANT CHANGE UP (ref 7–13)
POTASSIUM SERPL-MCNC: 3.9 MMOL/L — SIGNIFICANT CHANGE UP (ref 3.5–5.3)
POTASSIUM SERPL-SCNC: 3.9 MMOL/L — SIGNIFICANT CHANGE UP (ref 3.5–5.3)
RBC # BLD: 5.12 M/UL — SIGNIFICANT CHANGE UP (ref 4.2–5.8)
RBC # FLD: 13.5 % — SIGNIFICANT CHANGE UP (ref 10.3–14.5)
SODIUM SERPL-SCNC: 141 MMOL/L — SIGNIFICANT CHANGE UP (ref 135–145)
WBC # BLD: 14.51 K/UL — HIGH (ref 3.8–10.5)
WBC # FLD AUTO: 14.51 K/UL — HIGH (ref 3.8–10.5)

## 2018-07-27 RX ORDER — SODIUM CHLORIDE 9 MG/ML
1000 INJECTION, SOLUTION INTRAVENOUS
Qty: 0 | Refills: 0 | Status: DISCONTINUED | OUTPATIENT
Start: 2018-08-01 | End: 2018-08-16

## 2018-07-27 RX ORDER — ALBUTEROL 90 UG/1
0 AEROSOL, METERED ORAL
Qty: 0 | Refills: 0 | COMMUNITY

## 2018-07-27 RX ORDER — FLUTICASONE PROPIONATE AND SALMETEROL 50; 250 UG/1; UG/1
1 POWDER ORAL; RESPIRATORY (INHALATION)
Qty: 0 | Refills: 0 | COMMUNITY

## 2018-07-27 NOTE — H&P PST ADULT - ITE SK HX ROS MEA POS PC
currently treated for atopic dermatitis flare up- getting better/rash/itching completed treatment for atopic dermatitis flare up- almost cleared/rash/itching

## 2018-07-27 NOTE — H&P PST ADULT - PROBLEM SELECTOR PLAN 1
Pt is scheduled for nasal septal reconstruction, septoplasty, turbinectomy, osteotomies, cartilage grafts for 8/1/18. Preop instructions, pepcid provided. Pt stated understanding.

## 2018-07-27 NOTE — H&P PST ADULT - PMH
Asthma  history of ICU admission for Exacerbation in 2017), no history of intubations  Atopic dermatitis    Deviated nasal septum    Eczema    Mitral valve disorder  cleft in mitral valve - congenital, asymptomatic  Sleep apnea  resolved, s/p Mandibular advancement with angioplasty (7/2016)

## 2018-07-27 NOTE — H&P PST ADULT - NSANTHOSAYNRD_GEN_A_CORE
No. SETH screening performed.  STOP BANG Legend: 0-2 = LOW Risk; 3-4 = INTERMEDIATE Risk; 5-8 = HIGH Risk

## 2018-07-27 NOTE — H&P PST ADULT - NEGATIVE MUSCULOSKELETAL SYMPTOMS
no back pain/no arthralgia/no myalgia/no muscle weakness/no muscle cramps/no stiffness/no neck pain/no arthritis/no joint swelling

## 2018-07-27 NOTE — H&P PST ADULT - NEGATIVE NEUROLOGICAL SYMPTOMS
no transient paralysis/no paresthesias/no focal seizures/no generalized seizures/no loss of sensation/no weakness/no difficulty walking/no syncope/no headache/no vertigo/no loss of consciousness

## 2018-07-27 NOTE — H&P PST ADULT - NEGATIVE CARDIOVASCULAR SYMPTOMS
no paroxysmal nocturnal dyspnea/no dyspnea on exertion/no chest pain/no peripheral edema/no palpitations

## 2018-07-27 NOTE — H&P PST ADULT - MUSCULOSKELETAL
details… detailed exam no joint swelling/no joint erythema/no calf tenderness/ROM intact/no joint warmth/normal strength

## 2018-07-27 NOTE — H&P PST ADULT - RS GEN PE MLT RESP DETAILS PC
good air movement/no rales/no rhonchi/airway patent/no chest wall tenderness/no wheezes/breath sounds equal/respirations non-labored/clear to auscultation bilaterally

## 2018-07-27 NOTE — H&P PST ADULT - HISTORY OF PRESENT ILLNESS
deviated nasal septum 22 year old male presents to presurgical testing with diagnosis of deviated nasal septum scheduled for nasal septal reconstruction, septoplasty, turbinectomy, osteotomies, cartilage grafts for 8/1/18. Pt complaining of multiple outdoor and indoor allergies, asthma, difficulty breathing through nose, with persistent symptoms despite conservative management

## 2018-07-27 NOTE — H&P PST ADULT - FAMILY HISTORY
No pertinent family history in first degree relatives, of note: pt is adopted unknown family history

## 2018-07-28 PROBLEM — G47.30 SLEEP APNEA, UNSPECIFIED: Chronic | Status: ACTIVE | Noted: 2017-11-25

## 2018-07-31 ENCOUNTER — TRANSCRIPTION ENCOUNTER (OUTPATIENT)
Age: 23
End: 2018-07-31

## 2018-08-01 ENCOUNTER — OUTPATIENT (OUTPATIENT)
Dept: OUTPATIENT SERVICES | Facility: HOSPITAL | Age: 23
LOS: 1 days | Discharge: HOME | End: 2018-08-01

## 2018-08-01 VITALS
SYSTOLIC BLOOD PRESSURE: 113 MMHG | RESPIRATION RATE: 16 BRPM | HEART RATE: 69 BPM | OXYGEN SATURATION: 99 % | DIASTOLIC BLOOD PRESSURE: 68 MMHG

## 2018-08-01 VITALS
SYSTOLIC BLOOD PRESSURE: 121 MMHG | HEIGHT: 66 IN | HEART RATE: 76 BPM | RESPIRATION RATE: 16 BRPM | OXYGEN SATURATION: 97 % | DIASTOLIC BLOOD PRESSURE: 79 MMHG | WEIGHT: 212.08 LBS | TEMPERATURE: 98 F

## 2018-08-01 DIAGNOSIS — J34.2 DEVIATED NASAL SEPTUM: ICD-10-CM

## 2018-08-01 DIAGNOSIS — G47.33 OBSTRUCTIVE SLEEP APNEA (ADULT) (PEDIATRIC): Chronic | ICD-10-CM

## 2018-08-01 RX ORDER — OXYCODONE HYDROCHLORIDE 5 MG/1
5 TABLET ORAL ONCE
Qty: 0 | Refills: 0 | Status: DISCONTINUED | OUTPATIENT
Start: 2018-08-01 | End: 2018-08-01

## 2018-08-01 RX ORDER — FENTANYL CITRATE 50 UG/ML
25 INJECTION INTRAVENOUS
Qty: 0 | Refills: 0 | Status: DISCONTINUED | OUTPATIENT
Start: 2018-08-01 | End: 2018-08-01

## 2018-08-01 RX ORDER — FENTANYL CITRATE 50 UG/ML
50 INJECTION INTRAVENOUS
Qty: 0 | Refills: 0 | Status: DISCONTINUED | OUTPATIENT
Start: 2018-08-01 | End: 2018-08-01

## 2018-08-01 RX ORDER — ONDANSETRON 8 MG/1
4 TABLET, FILM COATED ORAL EVERY 4 HOURS
Qty: 0 | Refills: 0 | Status: DISCONTINUED | OUTPATIENT
Start: 2018-08-01 | End: 2018-08-16

## 2018-08-01 RX ADMIN — OXYCODONE HYDROCHLORIDE 5 MILLIGRAM(S): 5 TABLET ORAL at 14:18

## 2018-08-01 RX ADMIN — SODIUM CHLORIDE 75 MILLILITER(S): 9 INJECTION, SOLUTION INTRAVENOUS at 11:45

## 2018-08-01 RX ADMIN — OXYCODONE HYDROCHLORIDE 5 MILLIGRAM(S): 5 TABLET ORAL at 14:45

## 2018-08-01 RX ADMIN — FENTANYL CITRATE 25 MICROGRAM(S): 50 INJECTION INTRAVENOUS at 13:02

## 2018-08-01 NOTE — ASU DISCHARGE PLAN (ADULT/PEDIATRIC). - SPECIAL INSTRUCTIONS
>> Keep your head elevated --- even when sleeping.  >> Do NOT remove the nasal splint.   >> Do NOT remove the steri-strips.    >> Do NOT blow your nose.  >> Do NOT sneeze. If you feel the urge to sneeze, breathe through a widely open mouth.   >> Do NOT engage in strenuous exercise or activities which may cause you to breathe heavily.  >> Do NOT engage in any activities which may result in trauma to your face.   >> Do NOT place anything inside your nose (e.g., fingers, Q-tips)

## 2018-08-01 NOTE — ASU PREOP CHECKLIST - COMMENTS
Pt took famotidine with sip of water at 5am and advair inhaler Pt took famotidine with sip of water at 5am and advair, and proair inhaler

## 2018-08-01 NOTE — BRIEF OPERATIVE NOTE - OPERATION/FINDINGS
Preoperative Diagnosis: h/o orthognathic surgery; nasal deformity; midface hypoplasia  Procedure: Open rhinoplasty; Fat grafting to bilateral malar eminences/cheeks (2cc per side)  Postoperative Diagnosis: h/o orthognathic surgery; nasal deformity; midface hypoplasia

## 2018-08-01 NOTE — BRIEF OPERATIVE NOTE - PROCEDURE
<<-----Click on this checkbox to enter Procedure Fat grafting  08/01/2018    Active  SHNAIQUE  Rhinoplasty  08/01/2018    Active  SHANIQUE

## 2018-08-01 NOTE — ASU DISCHARGE PLAN (ADULT/PEDIATRIC). - INSTRUCTIONS
The patient may resume a regular diet. The patient may resume a regular diet.  avoid alcohol, citrus and spicy foods until sore throat is gone

## 2018-08-01 NOTE — ASU DISCHARGE PLAN (ADULT/PEDIATRIC). - MEDICATION SUMMARY - MEDICATIONS TO TAKE
I will START or STAY ON the medications listed below when I get home from the hospital:    predniSONE 10 mg oral tablet  -- 1 tab(s) by mouth 2 times a day  last dose on 7/24/18  -- Indication: For Home medication    Zofran 4 mg oral tablet  -- 1 tab(s) by mouth every 8 hours, As Needed - for nausea  -- Indication: For Nausea    Allegra 180 mg oral tablet  -- 1 tab(s) by mouth once a day, am  -- Indication: For Home medication    Advair Diskus 250 mcg-50 mcg inhalation powder  -- 1 puff(s) inhaled 2 times a day  -- Indication: For Home medication    ProAir HFA 90 mcg/inh inhalation aerosol  -- 1 inhaler(s) inhaled every 6 hours, As Needed  -- Indication: For Home medication    Duricef 500 mg oral capsule  -- 1 cap(s) by mouth every 12 hours  -- Indication: For Infection Prophylaxis I will START or STAY ON the medications listed below when I get home from the hospital:    predniSONE 10 mg oral tablet  -- 1 tab(s) by mouth 2 times a day  last dose on 7/24/18  -- Indication: For Home medication    traMADol 50 mg oral tablet  -- 1 tab(s) by mouth every 6 hours, As Needed - for moderate pain  -- Indication: For Pain    Zofran 4 mg oral tablet  -- 1 tab(s) by mouth every 8 hours, As Needed - for nausea  -- Indication: For Nausea    Allegra 180 mg oral tablet  -- 1 tab(s) by mouth once a day, am  -- Indication: For Home medication    Advair Diskus 250 mcg-50 mcg inhalation powder  -- 1 puff(s) inhaled 2 times a day  -- Indication: For Home medication    ProAir HFA 90 mcg/inh inhalation aerosol  -- 1 inhaler(s) inhaled every 6 hours, As Needed  -- Indication: For Home medication    Duricef 500 mg oral capsule  -- 1 cap(s) by mouth every 12 hours  -- Indication: For Infection Prophylaxis

## 2018-08-02 ENCOUNTER — TRANSCRIPTION ENCOUNTER (OUTPATIENT)
Age: 23
End: 2018-08-02

## 2019-01-01 ENCOUNTER — INPATIENT (INPATIENT)
Facility: HOSPITAL | Age: 24
LOS: 2 days | Discharge: HOME | End: 2019-01-04
Attending: INTERNAL MEDICINE | Admitting: INTERNAL MEDICINE
Payer: COMMERCIAL

## 2019-01-01 VITALS — HEART RATE: 84 BPM | WEIGHT: 205.03 LBS | HEIGHT: 66 IN | OXYGEN SATURATION: 92 %

## 2019-01-01 DIAGNOSIS — G47.33 OBSTRUCTIVE SLEEP APNEA (ADULT) (PEDIATRIC): Chronic | ICD-10-CM

## 2019-01-01 LAB
ALBUMIN SERPL ELPH-MCNC: 3.4 G/DL — SIGNIFICANT CHANGE UP (ref 3.3–5)
ALP SERPL-CCNC: 74 U/L — SIGNIFICANT CHANGE UP (ref 40–120)
ALT FLD-CCNC: 47 U/L — SIGNIFICANT CHANGE UP (ref 12–78)
ANION GAP SERPL CALC-SCNC: 4 MMOL/L — LOW (ref 5–17)
AST SERPL-CCNC: 43 U/L — HIGH (ref 15–37)
BASOPHILS # BLD AUTO: 0.14 K/UL — SIGNIFICANT CHANGE UP (ref 0–0.2)
BASOPHILS NFR BLD AUTO: 1 % — SIGNIFICANT CHANGE UP (ref 0–2)
BILIRUB SERPL-MCNC: 0.5 MG/DL — SIGNIFICANT CHANGE UP (ref 0.2–1.2)
BUN SERPL-MCNC: 13 MG/DL — SIGNIFICANT CHANGE UP (ref 7–23)
CALCIUM SERPL-MCNC: 8.4 MG/DL — LOW (ref 8.5–10.1)
CHLORIDE SERPL-SCNC: 110 MMOL/L — HIGH (ref 96–108)
CO2 SERPL-SCNC: 28 MMOL/L — SIGNIFICANT CHANGE UP (ref 22–31)
CREAT SERPL-MCNC: 0.96 MG/DL — SIGNIFICANT CHANGE UP (ref 0.5–1.3)
EOSINOPHIL # BLD AUTO: 1.72 K/UL — HIGH (ref 0–0.5)
EOSINOPHIL NFR BLD AUTO: 12.7 % — HIGH (ref 0–6)
GLUCOSE SERPL-MCNC: 107 MG/DL — HIGH (ref 70–99)
HCT VFR BLD CALC: 45.4 % — SIGNIFICANT CHANGE UP (ref 39–50)
HGB BLD-MCNC: 15.1 G/DL — SIGNIFICANT CHANGE UP (ref 13–17)
IMM GRANULOCYTES NFR BLD AUTO: 0.3 % — SIGNIFICANT CHANGE UP (ref 0–1.5)
LYMPHOCYTES # BLD AUTO: 1.78 K/UL — SIGNIFICANT CHANGE UP (ref 1–3.3)
LYMPHOCYTES # BLD AUTO: 13.2 % — SIGNIFICANT CHANGE UP (ref 13–44)
MCHC RBC-ENTMCNC: 31 PG — SIGNIFICANT CHANGE UP (ref 27–34)
MCHC RBC-ENTMCNC: 33.3 GM/DL — SIGNIFICANT CHANGE UP (ref 32–36)
MCV RBC AUTO: 93.2 FL — SIGNIFICANT CHANGE UP (ref 80–100)
MONOCYTES # BLD AUTO: 0.9 K/UL — SIGNIFICANT CHANGE UP (ref 0–0.9)
MONOCYTES NFR BLD AUTO: 6.7 % — SIGNIFICANT CHANGE UP (ref 2–14)
NEUTROPHILS # BLD AUTO: 8.93 K/UL — HIGH (ref 1.8–7.4)
NEUTROPHILS NFR BLD AUTO: 66.1 % — SIGNIFICANT CHANGE UP (ref 43–77)
PLATELET # BLD AUTO: 181 K/UL — SIGNIFICANT CHANGE UP (ref 150–400)
POTASSIUM SERPL-MCNC: 4.6 MMOL/L — SIGNIFICANT CHANGE UP (ref 3.5–5.3)
POTASSIUM SERPL-SCNC: 4.6 MMOL/L — SIGNIFICANT CHANGE UP (ref 3.5–5.3)
PROT SERPL-MCNC: 6.7 GM/DL — SIGNIFICANT CHANGE UP (ref 6–8.3)
RBC # BLD: 4.87 M/UL — SIGNIFICANT CHANGE UP (ref 4.2–5.8)
RBC # FLD: 12.7 % — SIGNIFICANT CHANGE UP (ref 10.3–14.5)
SODIUM SERPL-SCNC: 142 MMOL/L — SIGNIFICANT CHANGE UP (ref 135–145)
WBC # BLD: 13.51 K/UL — HIGH (ref 3.8–10.5)
WBC # FLD AUTO: 13.51 K/UL — HIGH (ref 3.8–10.5)

## 2019-01-01 PROCEDURE — 93010 ELECTROCARDIOGRAM REPORT: CPT

## 2019-01-01 PROCEDURE — 99285 EMERGENCY DEPT VISIT HI MDM: CPT

## 2019-01-01 PROCEDURE — 71045 X-RAY EXAM CHEST 1 VIEW: CPT | Mod: 26

## 2019-01-01 RX ORDER — IPRATROPIUM/ALBUTEROL SULFATE 18-103MCG
3 AEROSOL WITH ADAPTER (GRAM) INHALATION ONCE
Qty: 0 | Refills: 0 | Status: COMPLETED | OUTPATIENT
Start: 2019-01-01 | End: 2019-01-01

## 2019-01-01 RX ORDER — IPRATROPIUM/ALBUTEROL SULFATE 18-103MCG
3 AEROSOL WITH ADAPTER (GRAM) INHALATION
Qty: 0 | Refills: 0 | Status: COMPLETED | OUTPATIENT
Start: 2019-01-01 | End: 2019-01-01

## 2019-01-01 RX ORDER — MAGNESIUM SULFATE 500 MG/ML
2 VIAL (ML) INJECTION ONCE
Qty: 0 | Refills: 0 | Status: COMPLETED | OUTPATIENT
Start: 2019-01-01 | End: 2019-01-01

## 2019-01-01 RX ORDER — SODIUM CHLORIDE 9 MG/ML
1000 INJECTION INTRAMUSCULAR; INTRAVENOUS; SUBCUTANEOUS ONCE
Qty: 0 | Refills: 0 | Status: COMPLETED | OUTPATIENT
Start: 2019-01-01 | End: 2019-01-01

## 2019-01-01 RX ADMIN — Medication 3 MILLILITER(S): at 18:59

## 2019-01-01 RX ADMIN — Medication 3 MILLILITER(S): at 22:38

## 2019-01-01 RX ADMIN — Medication 2 GRAM(S): at 20:21

## 2019-01-01 RX ADMIN — Medication 3 MILLILITER(S): at 18:39

## 2019-01-01 RX ADMIN — Medication 3 MILLILITER(S): at 19:20

## 2019-01-01 RX ADMIN — Medication 125 MILLIGRAM(S): at 19:20

## 2019-01-01 RX ADMIN — Medication 50 GRAM(S): at 19:20

## 2019-01-01 RX ADMIN — SODIUM CHLORIDE 1000 MILLILITER(S): 9 INJECTION INTRAMUSCULAR; INTRAVENOUS; SUBCUTANEOUS at 20:15

## 2019-01-01 RX ADMIN — SODIUM CHLORIDE 1000 MILLILITER(S): 9 INJECTION INTRAMUSCULAR; INTRAVENOUS; SUBCUTANEOUS at 21:16

## 2019-01-01 NOTE — ED STATDOCS - PROGRESS NOTE DETAILS
Huber PHILLIPS for ED attending, Dr. Verdugo: 22 y/o male with a PMHx of asthma presents to the ED c/o asthma exacerbation. Pt states he got sick with a cough and cold 1 month ago and since then has been experiencing worsening and more frequent asthma exacerbations. Today, pt reports asthma exacerbation with dyspnea and Spo2 of 91% room air. Pt also reports cough. Pt has been taking albuterol treatments at home without relief of symptoms. Pt reports history of 2 hospitalizations in the past for asthma, 1 admission to ICU. No history of intubation for asthma exacerbation. No other acute complaints at time of eval. Will send pt to main ED for further evaluation. Huber PHILLIPS for ED attending, Dr. Verdugo: 24 y/o male with a PMHx of asthma presents to the ED c/o asthma exacerbation. Pt states he got sick with a cough and cold 1 month ago and since then has been experiencing worsening and more frequent asthma exacerbations. Today, pt reports asthma exacerbation with dyspnea and Spo2 of 91% room air. Pt also reports cough. Pt has been taking albuterol treatments at home without relief of symptoms. Pt reports history of 2 hospitalizations in the past for asthma, 1 admission to ICU. No history of intubation for asthma exacerbation. Pt desatting to 88-89% in room during eval. On exam, pt is very tight. Will need closer monitoring, will send pt to main ED for further evaluation.

## 2019-01-01 NOTE — ED PROVIDER NOTE - OBJECTIVE STATEMENT
24 y/o male with a PMHx of eczema, asthma presenting to the ED with family at bedside c/o asthma exacerbation. Pt states he had a URI x1 month ago, treated with Z-magnolia, Amoxicillin, x1 week of steroids and since then has been experiencing worsening and more frequent asthma exacerbations. Today, pt reports asthma exacerbation with dyspnea and Spo2 of 91% room air. Pt also reports +cough. Pt has been taking albuterol treatments multiple times daily at home without relief of symptoms. Pt reports history of 2 hospitalizations in the past for asthma, 1 admission to ICU. No history of intubation for asthma exacerbation. Scheduled for appointment with pulmonologist next week. Denies fever, chills, n/v, abd pain, any other acute c/o. Nonsmoker, no EtOH or illicit drug use. NKDA. 22 y/o male with a PMHx of eczema, asthma presenting to the ED with family at bedside c/o asthma exacerbation. Pt states he had a URI x1 month ago, treated with Z-magnolia, Amoxicillin, and x1 week of Prednisone with some improvement to sx but since stopping medications has been experiencing worsening and more frequent asthma exacerbations. Today, pt reports asthma exacerbation with SOB and Spo2 of 91% room air. Pt also reports +cough, +congestion. Pt has been taking albuterol treatments multiple times daily at home with minimal relief of symptoms. Pt reports history of 2 hospitalizations in the past for asthma, 1 admission to ICU, last visit was one year ago. No history of intubation for asthma exacerbation. Scheduled for appointment with pulmonologist next week. Denies fever, chills, n/v, abd pain, skin rash, body aches, or any other acute c/o. Nonsmoker, no EtOH or illicit drug use. NKDA.

## 2019-01-01 NOTE — ED ADULT NURSE REASSESSMENT NOTE - NS ED NURSE REASSESS COMMENT FT1
Report received from Jem MEDINA RN, care assumed at this time. Patient received via stretcher. Patient tolerating nebulizer tx well, family at bedside. Patient able to speak full sentences at this time, no respiratory distress noted. Call bell within reach. Comfort and safety maintained. Will continue to monitor.

## 2019-01-01 NOTE — ED ADULT TRIAGE NOTE - CHIEF COMPLAINT QUOTE
Patient presents with asthma exacerbation, reports increased use of inhaler and nebulizer treatments since Thanksgiving. Reports nebulizer used 5 times today.

## 2019-01-01 NOTE — ED PROVIDER NOTE - NS_ ATTENDINGSCRIBEDETAILS _ED_A_ED_FT
I, Gage Mendez MD,  performed the initial face to face bedside interview with this patient regarding history of present illness, review of symptoms and relevant past medical, social and family history.  I completed an independent physical examination.  I was the initial provider who evaluated this patient.  The history, relevant review of systems, past medical and surgical history, medical decision making, and physical examination was documented by the scribe in my presence and I attest to the accuracy of the documentation.

## 2019-01-01 NOTE — ED PROVIDER NOTE - PHYSICAL EXAMINATION
Constitutional: mild distress AAOx3  Eyes: PERRLA EOMI  Head: Normocephalic atraumatic  Mouth: MMM  Cardiac: regular rate   Resp: +diffuse bilateral wheezing. No retractions.   GI: Abd s/nt/nd  Neuro: CN2-12 intact  Skin: No rashes

## 2019-01-01 NOTE — ED ADULT NURSE NOTE - NSIMPLEMENTINTERV_GEN_ALL_ED
Implemented All Fall Risk Interventions:  [place] to call system. Call bell, personal items and telephone within reach. Instruct patient to call for assistance. Room bathroom lighting operational. Non-slip footwear when patient is off stretcher. Physically safe environment: no spills, clutter or unnecessary equipment. Stretcher in lowest position, wheels locked, appropriate side rails in place. Provide visual cue, wrist band, yellow gown, etc. Monitor gait and stability. Monitor for mental status changes and reorient to person, place, and time. Review medications for side effects contributing to fall risk. Reinforce activity limits and safety measures with patient and family.

## 2019-01-01 NOTE — ED ADULT NURSE REASSESSMENT NOTE - NS ED NURSE REASSESS COMMENT FT1
Patient placed on 50% venti mask due to increased difficulty breathing and slight desaturation (92% on 4L O2 NC) & c/o increased chest tightness after initial improvement. Patient repositioned & given 4th duoneb as per emar. Dr. Mendez made aware. Patient benefitted from venti mask. O2 saturation improved to 97%. Will continue to monitor closely.

## 2019-01-01 NOTE — ED ADULT NURSE NOTE - OBJECTIVE STATEMENT
Patient presents to ED c/o difficulty breathing, cough, & chest tightness secondary to asthma exacerbation. Patient also states that he's had an upper respiratory infection over the last few days. Patient denies fever. Patient able to speak in full sentences. SpO2 88% on room air; improvement in saturation noted with 3L O2 nasal cannula. Patient offers no other complaints.

## 2019-01-01 NOTE — ED PROVIDER NOTE - MEDICAL DECISION MAKING DETAILS
22 y/o m with hx of asthma, requiring ICU in the past but never intubation, last hospitalization x1 year ago, presenting for cough, congestion, SOB for past month. Pt has been on Prednisone with some improvement but sx returned once he stopped. No fever, skin rash or body aches. Uses albuterol multiple times a day. Has appointment with pulmonologist next week. Here pt has diffuse BL wheezing but no respiratory distress, no retractions. Concern for asthma exacerbation, will give nebs, steroids and reassess.

## 2019-01-01 NOTE — ED PROVIDER NOTE - NS ED ROS FT
Constitutional: No fever or chills  Eyes: No visual changes  HEENT: No throat pain  CV: No chest pain  Resp: +cough, +dyspnea  GI: No abd pain, nausea or vomiting  : No dysuria  MSK: No musculoskeletal pain  Skin: No rash  Neuro: No headache

## 2019-01-01 NOTE — ED PROVIDER NOTE - CHPI ED SYMPTOMS NEG
no fever/no chills/no n/v, no abd pain no n/v, no abd pain, no rash/no chills/no fever/no body aches

## 2019-01-02 PROBLEM — J34.2 DEVIATED NASAL SEPTUM: Chronic | Status: ACTIVE | Noted: 2018-07-27

## 2019-01-02 PROBLEM — I05.9 RHEUMATIC MITRAL VALVE DISEASE, UNSPECIFIED: Chronic | Status: ACTIVE | Noted: 2018-07-27

## 2019-01-02 PROBLEM — L20.9 ATOPIC DERMATITIS, UNSPECIFIED: Chronic | Status: ACTIVE | Noted: 2018-07-27

## 2019-01-02 LAB — RAPID RVP RESULT: SIGNIFICANT CHANGE UP

## 2019-01-02 PROCEDURE — 93010 ELECTROCARDIOGRAM REPORT: CPT

## 2019-01-02 RX ORDER — IPRATROPIUM/ALBUTEROL SULFATE 18-103MCG
3 AEROSOL WITH ADAPTER (GRAM) INHALATION
Qty: 0 | Refills: 0 | Status: DISCONTINUED | OUTPATIENT
Start: 2019-01-02 | End: 2019-01-04

## 2019-01-02 RX ORDER — IPRATROPIUM/ALBUTEROL SULFATE 18-103MCG
3 AEROSOL WITH ADAPTER (GRAM) INHALATION EVERY 6 HOURS
Qty: 0 | Refills: 0 | Status: DISCONTINUED | OUTPATIENT
Start: 2019-01-02 | End: 2019-01-04

## 2019-01-02 RX ORDER — ONDANSETRON 8 MG/1
4 TABLET, FILM COATED ORAL EVERY 6 HOURS
Qty: 0 | Refills: 0 | Status: DISCONTINUED | OUTPATIENT
Start: 2019-01-02 | End: 2019-01-04

## 2019-01-02 RX ORDER — CEFTRIAXONE 500 MG/1
INJECTION, POWDER, FOR SOLUTION INTRAMUSCULAR; INTRAVENOUS
Qty: 0 | Refills: 0 | Status: DISCONTINUED | OUTPATIENT
Start: 2019-01-02 | End: 2019-01-02

## 2019-01-02 RX ORDER — ACETAMINOPHEN 500 MG
650 TABLET ORAL EVERY 6 HOURS
Qty: 0 | Refills: 0 | Status: DISCONTINUED | OUTPATIENT
Start: 2019-01-02 | End: 2019-01-04

## 2019-01-02 RX ORDER — SODIUM CHLORIDE 9 MG/ML
1000 INJECTION INTRAMUSCULAR; INTRAVENOUS; SUBCUTANEOUS
Qty: 0 | Refills: 0 | Status: DISCONTINUED | OUTPATIENT
Start: 2019-01-02 | End: 2019-01-04

## 2019-01-02 RX ORDER — BUDESONIDE AND FORMOTEROL FUMARATE DIHYDRATE 160; 4.5 UG/1; UG/1
2 AEROSOL RESPIRATORY (INHALATION)
Qty: 0 | Refills: 0 | Status: DISCONTINUED | OUTPATIENT
Start: 2019-01-02 | End: 2019-01-04

## 2019-01-02 RX ORDER — IPRATROPIUM/ALBUTEROL SULFATE 18-103MCG
3 AEROSOL WITH ADAPTER (GRAM) INHALATION ONCE
Qty: 0 | Refills: 0 | Status: COMPLETED | OUTPATIENT
Start: 2019-01-02 | End: 2019-01-02

## 2019-01-02 RX ORDER — CEFTRIAXONE 500 MG/1
1000 INJECTION, POWDER, FOR SOLUTION INTRAMUSCULAR; INTRAVENOUS EVERY 24 HOURS
Qty: 0 | Refills: 0 | Status: DISCONTINUED | OUTPATIENT
Start: 2019-01-02 | End: 2019-01-04

## 2019-01-02 RX ADMIN — CEFTRIAXONE 1000 MILLIGRAM(S): 500 INJECTION, POWDER, FOR SOLUTION INTRAMUSCULAR; INTRAVENOUS at 08:31

## 2019-01-02 RX ADMIN — Medication 3 MILLILITER(S): at 13:15

## 2019-01-02 RX ADMIN — SODIUM CHLORIDE 75 MILLILITER(S): 9 INJECTION INTRAMUSCULAR; INTRAVENOUS; SUBCUTANEOUS at 08:35

## 2019-01-02 RX ADMIN — Medication 3 MILLILITER(S): at 05:43

## 2019-01-02 RX ADMIN — Medication 3 MILLILITER(S): at 18:42

## 2019-01-02 RX ADMIN — Medication 40 MILLIGRAM(S): at 21:45

## 2019-01-02 RX ADMIN — Medication 40 MILLIGRAM(S): at 14:50

## 2019-01-02 RX ADMIN — SODIUM CHLORIDE 75 MILLILITER(S): 9 INJECTION INTRAMUSCULAR; INTRAVENOUS; SUBCUTANEOUS at 23:48

## 2019-01-02 RX ADMIN — Medication 1 TABLET(S): at 14:50

## 2019-01-02 NOTE — H&P ADULT - ASSESSMENT
Dyspnea, Cough and Wheezing 2ndry to Acute on chronic Asthma Exacerbation  -Admit to medical floors  -Failed outpatient steroids and ABX  -CXR clear  -IVF  -Solumedrol  -Nebs ATC  -Rocephin  -Symbicort  -Pulm consult    DVT ppx  Ambulation/SCDs    IMPROVE VTE Individual Risk Assessment    RISK                                                                Points    [  ] Previous VTE                                                  3    [  ] Thrombophilia                                               2    [  ] Lower limb paralysis                                      2        (unable to hold up >15 seconds)      [  ] Current Cancer                                              2         (within 6 months)    [  ] Immobilization > 24 hrs                                1    [  ] ICU/CCU stay > 24 hours                              1    [  ] Age > 60                                                      1    IMPROVE VTE Score ____0_____ Dyspnea, Cough and Wheezing 2ndry to Acute on chronic Asthma Exacerbation  Leukocytosis most likely 2ndry to steroids  -Admit to medical floors  -Failed outpatient steroids and ABX  -CXR clear  -IVF  -Solumedrol  -Nebs ATC and PRN  -Rocephin  -Symbicort  -Pulm consult  -MOntior WBC    DVT ppx  Ambulation/SCDs    IMPROVE VTE Individual Risk Assessment    RISK                                                                Points    [  ] Previous VTE                                                  3    [  ] Thrombophilia                                               2    [  ] Lower limb paralysis                                      2        (unable to hold up >15 seconds)      [  ] Current Cancer                                              2         (within 6 months)    [  ] Immobilization > 24 hrs                                1    [  ] ICU/CCU stay > 24 hours                              1    [  ] Age > 60                                                      1    IMPROVE VTE Score ____0_____

## 2019-01-02 NOTE — H&P ADULT - NSHPREVIEWOFSYSTEMS_GEN_ALL_CORE
REVIEW OF SYSTEMS:    CONSTITUTIONAL: No weakness, fevers or chills  EYES/ENT: No visual changes; vertigo or throat pain   NECK: No pain or stiffness  RESPIRATORY: No cough, wheezing, hemoptysis or shortness of breath  CARDIOVASCULAR: No chest pain or palpitations  GASTROINTESTINAL: No abdominal or epigastric pain. No nausea, vomiting, or hematemesis; No diarrhea or constipation. No melena or hematochezia.  GENITOURINARY: No dysuria, urinary frequency or hematuria  NEUROLOGICAL: No numbness or weakness  EXTREMITIES: No swelling or tenderness  SKIN: No itching, burning, rashes, or lesions   All other review of systems is negative unless indicated above. REVIEW OF SYSTEMS:    CONSTITUTIONAL: No weakness, fevers or chills  EYES/ENT: No visual changes; vertigo or throat pain   NECK: No pain or stiffness  RESPIRATORY: No hemoptysis +shortness of breath, cough and wheezing   CARDIOVASCULAR: No chest pain or palpitations  GASTROINTESTINAL: No abdominal or epigastric pain. No nausea, vomiting, or hematemesis; No diarrhea or constipation. No melena or hematochezia.  GENITOURINARY: No dysuria, urinary frequency or hematuria  NEUROLOGICAL: No numbness or weakness  EXTREMITIES: No swelling or tenderness  SKIN: No itching, burning, rashes, or lesions   All other review of systems is negative unless indicated above.

## 2019-01-02 NOTE — ED ADULT NURSE REASSESSMENT NOTE - NS ED NURSE REASSESS COMMENT FT1
Patient pending RVP and awaiting bed assignment. No distress at this time. Will continue to monitor.

## 2019-01-02 NOTE — H&P ADULT - NSHPLABSRESULTS_GEN_ALL_CORE
Lab Results:  CBC  CBC Full  -  ( 01 Jan 2019 20:27 )  WBC Count : 13.51 K/uL  Hemoglobin : 15.1 g/dL  Hematocrit : 45.4 %  Platelet Count - Automated : 181 K/uL  Mean Cell Volume : 93.2 fl  Mean Cell Hemoglobin : 31.0 pg  Mean Cell Hemoglobin Concentration : 33.3 gm/dL  Auto Neutrophil # : 8.93 K/uL  Auto Lymphocyte # : 1.78 K/uL  Auto Monocyte # : 0.90 K/uL  Auto Eosinophil # : 1.72 K/uL  Auto Basophil # : 0.14 K/uL  Auto Neutrophil % : 66.1 %  Auto Lymphocyte % : 13.2 %  Auto Monocyte % : 6.7 %  Auto Eosinophil % : 12.7 %  Auto Basophil % : 1.0 %    .		Differential:	[] Automated		[] Manual  Chemistry                        15.1   13.51 )-----------( 181      ( 01 Jan 2019 20:27 )             45.4     01-01    142  |  110<H>  |  13  ----------------------------<  107<H>  4.6   |  28  |  0.96    Ca    8.4<L>      01 Jan 2019 20:27    TPro  6.7  /  Alb  3.4  /  TBili  0.5  /  DBili  x   /  AST  43<H>  /  ALT  47  /  AlkPhos  74  01-01    LIVER FUNCTIONS - ( 01 Jan 2019 20:27 )  Alb: 3.4 g/dL / Pro: 6.7 gm/dL / ALK PHOS: 74 U/L / ALT: 47 U/L / AST: 43 U/L / GGT: x           RADIOLOGY RESULTS:    prelim CXR  - no infiltrates or effusion Lab Results:  CBC  CBC Full  -  ( 01 Jan 2019 20:27 )  WBC Count : 13.51 K/uL  Hemoglobin : 15.1 g/dL  Hematocrit : 45.4 %  Platelet Count - Automated : 181 K/uL  Mean Cell Volume : 93.2 fl  Mean Cell Hemoglobin : 31.0 pg  Mean Cell Hemoglobin Concentration : 33.3 gm/dL  Auto Neutrophil # : 8.93 K/uL  Auto Lymphocyte # : 1.78 K/uL  Auto Monocyte # : 0.90 K/uL  Auto Eosinophil # : 1.72 K/uL  Auto Basophil # : 0.14 K/uL  Auto Neutrophil % : 66.1 %  Auto Lymphocyte % : 13.2 %  Auto Monocyte % : 6.7 %  Auto Eosinophil % : 12.7 %  Auto Basophil % : 1.0 %    .		Differential:	[] Automated		[] Manual  Chemistry                        15.1   13.51 )-----------( 181      ( 01 Jan 2019 20:27 )             45.4     01-01    142  |  110<H>  |  13  ----------------------------<  107<H>  4.6   |  28  |  0.96    Ca    8.4<L>      01 Jan 2019 20:27    TPro  6.7  /  Alb  3.4  /  TBili  0.5  /  DBili  x   /  AST  43<H>  /  ALT  47  /  AlkPhos  74  01-01    LIVER FUNCTIONS - ( 01 Jan 2019 20:27 )  Alb: 3.4 g/dL / Pro: 6.7 gm/dL / ALK PHOS: 74 U/L / ALT: 47 U/L / AST: 43 U/L / GGT: x           RADIOLOGY RESULTS:    EKG- AFlutter? artifact will repeat     prelim CXR  - no infiltrates or effusion

## 2019-01-02 NOTE — H&P ADULT - NSHPPHYSICALEXAM_GEN_ALL_CORE
PHYSICAL EXAM:  Constitutional: NAD, awake and alert, well-developed  Neurological: AAO x 3, no focal deficits  HEENT: PERRLA, EOMI, MMM  Neck: Soft and supple, No LAD, No JVD  Respiratory: Breath sounds are clear bilaterally, + wheezing and rhonchi  Cardiovascular: S1 and S2, regular rate and rhythm; no Murmurs, gallops or rubs  Gastrointestinal: Bowel Sounds present, soft, nontender, nondistended, no guarding, no rebound tenderness  Back: No CVA tenderness   Extremities: No peripheral edema  Vascular: 2+ peripheral pulses  Musculoskeletal: 5/5 strength b/l upper and lower extremities  Skin: No rashes  Breast: Deferred  Rectal: Deferred PHYSICAL EXAM:  Constitutional: NAD, awake and alert, well-developed  Neurological: AAO x 3, no focal deficits  HEENT: PERRLA, EOMI, MMM  Neck: Soft and supple, No LAD, No JVD  Respiratory: Breath sounds are clear bilaterally, + wheezing B/L  Cardiovascular: S1 and S2, regular rate and rhythm; no Murmurs, gallops or rubs  Gastrointestinal: Bowel Sounds present, soft, nontender, nondistended, no guarding, no rebound tenderness  Back: No CVA tenderness   Extremities: No peripheral edema  Vascular: 2+ peripheral pulses  Musculoskeletal: 5/5 strength b/l upper and lower extremities  Skin: No rashes  Breast: Deferred  Rectal: Deferred

## 2019-01-02 NOTE — CONSULT NOTE ADULT - ASSESSMENT
Patient is seen and consult dictated     - asthma exacerbation and patient is symptomatic slowly improving with the steroids given in the ER with no gross pneumonia in the cxr   - allergic rhinitis   - known patient with the sleep apnea treated with maxially and mandibular advancement 2 years ago     PLAN     - consider discontinue Rocephin after the official cxr  report follow up   - follow up the RVP panel   - continue with the steroids and nebs and symbicort   - 02 supplementation if needed for the hypoxia   - suggested the patient to stay in the hospital  .  - peak flow monitoring

## 2019-01-02 NOTE — H&P ADULT - HISTORY OF PRESENT ILLNESS
24 y/o male with a PMHx of eczema, asthma presenting to the ED with family at bedside c/o asthma exacerbation. Pt states he had a URI x1 month ago, treated with Z-magnolia, Amoxicillin, and x1 week of Prednisone with some improvement to sx but since stopping medications has been experiencing worsening and more frequent asthma exacerbations. Today, pt reports asthma exacerbation with SOB and Spo2 of 91% room air. Pt also reports +cough, +congestion. Pt has been taking albuterol treatments multiple times daily at home with minimal relief of symptoms. Pt reports history of 2 hospitalizations in the past for asthma, 1 admission to ICU, last visit was one year ago. No history of intubation for asthma exacerbation. Scheduled for appointment with pulmonologist next week. Denies fever, chills, n/v, abd pain, skin rash, body aches    Past Medical History: Asthma, Eczema  Past Surgical History:  Social History:  Family History: 23 y/o male with PMHx of eczema, asthma presents to the ED with family at bedside c/o asthma exacerbation. Pt states he had a URI x1 month ago, treated with Z-magnolia, Amoxicillin, and x1 week of Prednisone with some improvement to sx but since stopping medications has been experiencing worsening and more frequent asthma exacerbations. Today, pt reports asthma exacerbation with SOB and Spo2 of 91% room air. Pt also reports +cough, +congestion. Pt has been taking albuterol treatments multiple times daily at home with minimal relief of symptoms. Pt reports history of 2 hospitalizations in the past for asthma, 1 admission to ICU, last visit was one year ago. No history of intubation for asthma exacerbation. Scheduled for appointment with pulmonologist next week. Denies fever, chills, n/v, abd pain, skin rash, body aches;     In the ER, patient received Solumedrol, Mg and Albuiterol. Even though he currently feels better and would like to go home, he also feels that his lungs is "tired out"; Pulse ox in the ER dips to the low 90s; currently vitals stable     Past Medical History: Asthma, Eczema  Past Surgical History:  Social History:  Family History: 23 y/o male with PMHx of eczema, asthma presents to the ED with family at bedside c/o asthma exacerbation. Pt states he had a URI x1 month ago, treated with Z-magnolia, Amoxicillin, and x1 week of Prednisone with some improvement to sx but since stopping medications has been experiencing worsening and more frequent asthma exacerbations. Today, pt reports asthma exacerbation with SOB and Spo2 of 91% room air. Pt also reports +cough, +congestion. Pt has been taking albuterol treatments multiple times daily at home with minimal relief of symptoms. Pt reports history of 2 hospitalizations in the past for asthma, 1 admission to ICU, last visit was one year ago. No history of intubation for asthma exacerbation. Scheduled for appointment with pulmonologist next week. Denies fever, chills, n/v, abd pain, skin rash, body aches;     In the ER, patient received Solumedrol, Mg and Albuiterol. Even though he currently feels better and would like to go home, he also feels that his lungs is "tired out"; Pulse ox in the ER dips to the low 90s; currently vitals stable     Past Medical History: Asthma, Eczema  Past Surgical History: 2 jaw surgery, congenital Heart dx  Social History: Lives at home, no tobacco, alcohol or illicit drug use  Family History: Patient is adopted

## 2019-01-03 LAB
ANION GAP SERPL CALC-SCNC: 10 MMOL/L — SIGNIFICANT CHANGE UP (ref 5–17)
BUN SERPL-MCNC: 14 MG/DL — SIGNIFICANT CHANGE UP (ref 7–23)
CALCIUM SERPL-MCNC: 8.5 MG/DL — SIGNIFICANT CHANGE UP (ref 8.5–10.1)
CHLORIDE SERPL-SCNC: 109 MMOL/L — HIGH (ref 96–108)
CO2 SERPL-SCNC: 22 MMOL/L — SIGNIFICANT CHANGE UP (ref 22–31)
CREAT SERPL-MCNC: 0.84 MG/DL — SIGNIFICANT CHANGE UP (ref 0.5–1.3)
GLUCOSE SERPL-MCNC: 119 MG/DL — HIGH (ref 70–99)
HCT VFR BLD CALC: 42.5 % — SIGNIFICANT CHANGE UP (ref 39–50)
HGB BLD-MCNC: 14.4 G/DL — SIGNIFICANT CHANGE UP (ref 13–17)
MAGNESIUM SERPL-MCNC: 1.9 MG/DL — SIGNIFICANT CHANGE UP (ref 1.6–2.6)
MCHC RBC-ENTMCNC: 30.7 PG — SIGNIFICANT CHANGE UP (ref 27–34)
MCHC RBC-ENTMCNC: 33.9 GM/DL — SIGNIFICANT CHANGE UP (ref 32–36)
MCV RBC AUTO: 90.6 FL — SIGNIFICANT CHANGE UP (ref 80–100)
NRBC # BLD: 0 /100 WBCS — SIGNIFICANT CHANGE UP (ref 0–0)
PHOSPHATE SERPL-MCNC: 4.2 MG/DL — SIGNIFICANT CHANGE UP (ref 2.5–4.5)
PLATELET # BLD AUTO: 191 K/UL — SIGNIFICANT CHANGE UP (ref 150–400)
POTASSIUM SERPL-MCNC: 3.8 MMOL/L — SIGNIFICANT CHANGE UP (ref 3.5–5.3)
POTASSIUM SERPL-SCNC: 3.8 MMOL/L — SIGNIFICANT CHANGE UP (ref 3.5–5.3)
RBC # BLD: 4.69 M/UL — SIGNIFICANT CHANGE UP (ref 4.2–5.8)
RBC # FLD: 13 % — SIGNIFICANT CHANGE UP (ref 10.3–14.5)
SODIUM SERPL-SCNC: 141 MMOL/L — SIGNIFICANT CHANGE UP (ref 135–145)
WBC # BLD: 12.1 K/UL — HIGH (ref 3.8–10.5)
WBC # FLD AUTO: 12.1 K/UL — HIGH (ref 3.8–10.5)

## 2019-01-03 RX ORDER — ALBUTEROL 90 UG/1
0 AEROSOL, METERED ORAL
Qty: 0 | Refills: 0 | COMMUNITY

## 2019-01-03 RX ORDER — TRAMADOL HYDROCHLORIDE 50 MG/1
1 TABLET ORAL
Qty: 0 | Refills: 0 | COMMUNITY

## 2019-01-03 RX ORDER — ONDANSETRON 8 MG/1
1 TABLET, FILM COATED ORAL
Qty: 0 | Refills: 0 | COMMUNITY

## 2019-01-03 RX ORDER — ALPRAZOLAM 0.25 MG
0.5 TABLET ORAL DAILY
Qty: 0 | Refills: 0 | Status: DISCONTINUED | OUTPATIENT
Start: 2019-01-03 | End: 2019-01-04

## 2019-01-03 RX ORDER — FLUTICASONE PROPIONATE AND SALMETEROL 50; 250 UG/1; UG/1
1 POWDER ORAL; RESPIRATORY (INHALATION)
Qty: 0 | Refills: 0 | COMMUNITY

## 2019-01-03 RX ORDER — OXYCODONE HYDROCHLORIDE 5 MG/1
5 TABLET ORAL EVERY 6 HOURS
Qty: 0 | Refills: 0 | Status: DISCONTINUED | OUTPATIENT
Start: 2019-01-03 | End: 2019-01-04

## 2019-01-03 RX ADMIN — Medication 40 MILLIGRAM(S): at 06:02

## 2019-01-03 RX ADMIN — Medication 3 MILLILITER(S): at 01:27

## 2019-01-03 RX ADMIN — BUDESONIDE AND FORMOTEROL FUMARATE DIHYDRATE 2 PUFF(S): 160; 4.5 AEROSOL RESPIRATORY (INHALATION) at 08:10

## 2019-01-03 RX ADMIN — CEFTRIAXONE 1000 MILLIGRAM(S): 500 INJECTION, POWDER, FOR SOLUTION INTRAMUSCULAR; INTRAVENOUS at 09:24

## 2019-01-03 RX ADMIN — Medication 3 MILLILITER(S): at 20:20

## 2019-01-03 RX ADMIN — OXYCODONE HYDROCHLORIDE 5 MILLIGRAM(S): 5 TABLET ORAL at 22:30

## 2019-01-03 RX ADMIN — Medication 3 MILLILITER(S): at 14:21

## 2019-01-03 RX ADMIN — BUDESONIDE AND FORMOTEROL FUMARATE DIHYDRATE 2 PUFF(S): 160; 4.5 AEROSOL RESPIRATORY (INHALATION) at 20:19

## 2019-01-03 RX ADMIN — OXYCODONE HYDROCHLORIDE 5 MILLIGRAM(S): 5 TABLET ORAL at 21:20

## 2019-01-03 RX ADMIN — Medication 3 MILLILITER(S): at 08:10

## 2019-01-03 RX ADMIN — Medication 1 TABLET(S): at 11:05

## 2019-01-03 RX ADMIN — Medication 40 MILLIGRAM(S): at 17:56

## 2019-01-03 NOTE — CDI QUERY NOTE - NSCDIOTHERTXTBX_GEN_ALL_CORE_HH
Documentation on chart of Acute on Chronic Asthma exacerbation.    Previous ICU admission.    Patient able to speak in full sentences. SpO2 88% on room air; improvement in saturation noted with 3L O2 nasal cannula    Desaturates frequently    RR 20-26    Please clarify a diagnosis based on the above clinical findings.    A) Suspected Acute Hypoxic Respiratory Failure  B) Acute Respiratory Distress Syndrome  C) Other ( Please specify condition)

## 2019-01-03 NOTE — PROGRESS NOTE ADULT - SUBJECTIVE AND OBJECTIVE BOX
HOSPITALIST PROGRESS NOTE:  SUBJECTIVE:  PCP:  Chief Complaint: Patient is a 23y old  Male who presents with a chief complaint of Dyspnea and wheezing (03 Jan 2019 14:10)      HPI:  22 y/o male with PMHx of eczema, asthma presents to the ED with family at bedside c/o asthma exacerbation. Pt states he had a URI x1 month ago, treated with Z-magnolia, Amoxicillin, and x1 week of Prednisone with some improvement to sx but since stopping medications has been experiencing worsening and more frequent asthma exacerbations. Today, pt reports asthma exacerbation with SOB and Spo2 of 91% room air. Pt also reports +cough, +congestion. Pt has been taking albuterol treatments multiple times daily at home with minimal relief of symptoms. Pt reports history of 2 hospitalizations in the past for asthma, 1 admission to ICU, last visit was one year ago. No history of intubation for asthma exacerbation. Scheduled for appointment with pulmonologist next week. Denies fever, chills, n/v, abd pain, skin rash, body aches;   In the ER, patient received Solumedrol, Mg and Albuiterol. Even though he currently feels better and would like to go home, he also feels that his lungs is "tired out"; Pulse ox in the ER dips to the low 90s; currently vitals stable     1/3: Above reviewed; this morning patient c/o CP and cough;  Pulse ox on ambulation in the low 90s    Allergies:  dust (Hives)  grass, trees, cats, dogs, horses, ragweed (Other)  No Known Drug Allergies    REVIEW OF SYSTEMS:  See HPI. All other review of systems is negative unless indicated above.     OBJECTIVE  Physical Exam:  Vital Signs:    Vital Signs Last 24 Hrs  T(C): 36.8 (03 Jan 2019 11:53), Max: 36.8 (03 Jan 2019 11:53)  T(F): 98.2 (03 Jan 2019 11:53), Max: 98.2 (03 Jan 2019 11:53)  HR: 86 (03 Jan 2019 14:21) (62 - 106)  BP: 124/66 (03 Jan 2019 11:53) (124/66 - 139/77)  BP(mean): --  RR: 19 (03 Jan 2019 11:53) (19 - 21)  SpO2: 92% (03 Jan 2019 11:53) (92% - 97%)  I&O's Summary    02 Jan 2019 07:01  -  03 Jan 2019 07:00  --------------------------------------------------------  IN: 1456 mL / OUT: 0 mL / NET: 1456 mL        Constitutional: NAD, awake and alert, well-developed  Neurological: AAO x 3, no focal deficits  HEENT: PERRLA, EOMI, MMM  Neck: Soft and supple, No LAD, No JVD  Respiratory: Breath sounds are clear bilaterally, No rales or rhonchi +Mild wheezing B/L  Cardiovascular: S1 and S2, regular rate and rhythm; no Murmurs, gallops or rubs  Gastrointestinal: Bowel Sounds present, soft, nontender, nondistended, no guarding, no rebound tenderness  Back: No CVA tenderness   Extremities: No peripheral edema  Vascular: 2+ peripheral pulses  Musculoskeletal: 5/5 strength b/l upper and lower extremities  Skin: No rashes  Breast: Deferred  Rectal: Deferred    MEDICATIONS  (STANDING):  ALBUTerol/ipratropium for Nebulization 3 milliLiter(s) Nebulizer every 6 hours  buDESOnide 160 MICROgram(s)/formoterol 4.5 MICROgram(s) Inhaler 2 Puff(s) Inhalation two times a day  cefTRIAXone Injectable. 1000 milliGRAM(s) IV Push every 24 hours  methylPREDNISolone sodium succinate Injectable 40 milliGRAM(s) IV Push every 12 hours  multivitamin 1 Tablet(s) Oral daily  sodium chloride 0.9%. 1000 milliLiter(s) (75 mL/Hr) IV Continuous <Continuous>      LABS: All Labs Reviewed:                        14.4   12.10 )-----------( 191      ( 03 Jan 2019 06:39 )             42.5     01-03    141  |  109<H>  |  14  ----------------------------<  119<H>  3.8   |  22  |  0.84    Ca    8.5      03 Jan 2019 06:39  Phos  4.2     01-03  Mg     1.9     01-03    TPro  6.7  /  Alb  3.4  /  TBili  0.5  /  DBili  x   /  AST  43<H>  /  ALT  47  /  AlkPhos  74  01-01      RADIOLOGY/EKG:    < from: Xray Chest 1 View- PORTABLE-Urgent (01.01.19 @ 19:33) >    IMPRESSION:    No infiltrate.    < end of copied text >

## 2019-01-03 NOTE — PROGRESS NOTE ADULT - SUBJECTIVE AND OBJECTIVE BOX
SUBJECTIVE     Patient has vague chest pains with the coughing episodes   still has cough and wheeze is better   His peak flow is getting to normal .   It seems patient was started on propanolol by the psychiatry recently   His sat sat are getting better      PAST MEDICAL & SURGICAL HISTORY:  Eczema  Asthma  Deviated nasal septum  Atopic dermatitis  Mitral valve disorder: cleft in mitral valve - congenital, asymptomatic  Sleep apnea: resolved, s/p Mandibular advancement with angioplasty (7/2016)  Eczema  Asthma: history of ICU admission for Exacerbation in 2017), no history of intubations  SETH (obstructive sleep apnea): Mandibular advancement with angioplasty 7/2016    OBJECTIVE   Vital Signs Last 24 Hrs  T(C): 36.8 (03 Jan 2019 11:53), Max: 36.8 (03 Jan 2019 11:53)  T(F): 98.2 (03 Jan 2019 11:53), Max: 98.2 (03 Jan 2019 11:53)  HR: 106 (03 Jan 2019 11:53) (62 - 106)  BP: 124/66 (03 Jan 2019 11:53) (124/66 - 139/77)  BP(mean): --  RR: 19 (03 Jan 2019 11:53) (19 - 21)  SpO2: 92% (03 Jan 2019 11:53) (92% - 97%)    PHYSICAL EXAM:  Constitutional: , awake and alert, not in distress.  HEENT: Normo cephalic atraumatic  Neck: Soft and supple, No J.V.D   Respiratory: vesicular breathing , No wheezing, rales or rhonchi and has cough episodes while taking deep breaths   Cardiovascular: S1 and S2, regular rate .   Gastrointestinal:  soft, nontender,   Extremities: No  edema or calf tenderness .  Neurological: No new  focal deficits.    MEDICATIONS  (STANDING):  ALBUTerol/ipratropium for Nebulization 3 milliLiter(s) Nebulizer every 6 hours  buDESOnide 160 MICROgram(s)/formoterol 4.5 MICROgram(s) Inhaler 2 Puff(s) Inhalation two times a day  cefTRIAXone Injectable. 1000 milliGRAM(s) IV Push every 24 hours  methylPREDNISolone sodium succinate Injectable 40 milliGRAM(s) IV Push every 12 hours  multivitamin 1 Tablet(s) Oral daily  sodium chloride 0.9%. 1000 milliLiter(s) (75 mL/Hr) IV Continuous <Continuous>                            14.4   12.10 )-----------( 191      ( 03 Jan 2019 06:39 )             42.5     01-03    141  |  109<H>  |  14  ----------------------------<  119<H>  3.8   |  22  |  0.84    Ca    8.5      03 Jan 2019 06:39  Phos  4.2     01-03  Mg     1.9     01-03    TPro  6.7  /  Alb  3.4  /  TBili  0.5  /  DBili  x   /  AST  43<H>  /  ALT  47  /  AlkPhos  74  01-01         < from: Xray Chest 1 View- PORTABLE-Urgent (01.01.19 @ 19:33) >  EXAM:  XR CHEST PORTABLE URGENT 1V                            PROCEDURE DATE:  01/01/2019          INTERPRETATION:  CLINICAL INDICATION:  cough    TECHNIQUE: Single view AP chest radiograph was performed.    COMPARISON:  Chest radiograph dated 11/25/2017    FINDINGS:    The bilateral lung fields demonstrate no evidence for pneumothorax,   consolidation, vascular congestion, or pleural effusion.    The cardiac silhouette size is within normal limits.    The visualized osseous structures are within normal limits for age.    IMPRESSION:    No infiltrate.      < end of copied text >

## 2019-01-03 NOTE — PROGRESS NOTE ADULT - ASSESSMENT
Dyspnea, Cough and Wheezing 2ndry to Acute on chronic Asthma Exacerbation  Leukocytosis most likely 2ndry to steroids  -Failed outpatient steroids and ABX  -CXR clear  -IVF  -taper Solumedrol 40mg IV q12  -Nebs ATC and PRN  -Rocephin#2  -Hycodan  -Symbicort  -Pulm consult appreciated   -MOntior WBC and FU peak Flow    Anxiety  -D/C propanolol as it may worsen his asthma; FU wi Pscy as outpatient   -Xanax QD PRN    DVT ppx  Ambulation/SCDs

## 2019-01-04 ENCOUNTER — TRANSCRIPTION ENCOUNTER (OUTPATIENT)
Age: 24
End: 2019-01-04

## 2019-01-04 VITALS
DIASTOLIC BLOOD PRESSURE: 64 MMHG | SYSTOLIC BLOOD PRESSURE: 129 MMHG | RESPIRATION RATE: 18 BRPM | HEART RATE: 115 BPM | OXYGEN SATURATION: 93 % | TEMPERATURE: 97 F

## 2019-01-04 LAB
ANION GAP SERPL CALC-SCNC: 9 MMOL/L — SIGNIFICANT CHANGE UP (ref 5–17)
BUN SERPL-MCNC: 15 MG/DL — SIGNIFICANT CHANGE UP (ref 7–23)
CALCIUM SERPL-MCNC: 8.3 MG/DL — LOW (ref 8.5–10.1)
CHLORIDE SERPL-SCNC: 107 MMOL/L — SIGNIFICANT CHANGE UP (ref 96–108)
CO2 SERPL-SCNC: 26 MMOL/L — SIGNIFICANT CHANGE UP (ref 22–31)
CREAT SERPL-MCNC: 0.96 MG/DL — SIGNIFICANT CHANGE UP (ref 0.5–1.3)
GLUCOSE SERPL-MCNC: 97 MG/DL — SIGNIFICANT CHANGE UP (ref 70–99)
HCT VFR BLD CALC: 41.7 % — SIGNIFICANT CHANGE UP (ref 39–50)
HGB BLD-MCNC: 14.2 G/DL — SIGNIFICANT CHANGE UP (ref 13–17)
MAGNESIUM SERPL-MCNC: 2 MG/DL — SIGNIFICANT CHANGE UP (ref 1.6–2.6)
MCHC RBC-ENTMCNC: 31.6 PG — SIGNIFICANT CHANGE UP (ref 27–34)
MCHC RBC-ENTMCNC: 34.1 GM/DL — SIGNIFICANT CHANGE UP (ref 32–36)
MCV RBC AUTO: 92.7 FL — SIGNIFICANT CHANGE UP (ref 80–100)
NRBC # BLD: 0 /100 WBCS — SIGNIFICANT CHANGE UP (ref 0–0)
PHOSPHATE SERPL-MCNC: 4.1 MG/DL — SIGNIFICANT CHANGE UP (ref 2.5–4.5)
PLATELET # BLD AUTO: 175 K/UL — SIGNIFICANT CHANGE UP (ref 150–400)
POTASSIUM SERPL-MCNC: 3.8 MMOL/L — SIGNIFICANT CHANGE UP (ref 3.5–5.3)
POTASSIUM SERPL-SCNC: 3.8 MMOL/L — SIGNIFICANT CHANGE UP (ref 3.5–5.3)
RBC # BLD: 4.5 M/UL — SIGNIFICANT CHANGE UP (ref 4.2–5.8)
RBC # FLD: 13.2 % — SIGNIFICANT CHANGE UP (ref 10.3–14.5)
SODIUM SERPL-SCNC: 142 MMOL/L — SIGNIFICANT CHANGE UP (ref 135–145)
WBC # BLD: 12.15 K/UL — HIGH (ref 3.8–10.5)
WBC # FLD AUTO: 12.15 K/UL — HIGH (ref 3.8–10.5)

## 2019-01-04 RX ORDER — PROPRANOLOL HCL 160 MG
1 CAPSULE, EXTENDED RELEASE 24HR ORAL
Qty: 0 | Refills: 0 | COMMUNITY

## 2019-01-04 RX ORDER — FLUTICASONE PROPIONATE 50 MCG
1 SPRAY, SUSPENSION NASAL
Qty: 0 | Refills: 0 | Status: DISCONTINUED | OUTPATIENT
Start: 2019-01-04 | End: 2019-01-04

## 2019-01-04 RX ORDER — FLUTICASONE PROPIONATE AND SALMETEROL 50; 250 UG/1; UG/1
1 POWDER ORAL; RESPIRATORY (INHALATION)
Qty: 0 | Refills: 0 | COMMUNITY

## 2019-01-04 RX ORDER — ALBUTEROL 90 UG/1
1 AEROSOL, METERED ORAL
Qty: 0 | Refills: 0 | COMMUNITY

## 2019-01-04 RX ORDER — ALBUTEROL 90 UG/1
1 AEROSOL, METERED ORAL
Qty: 1 | Refills: 0
Start: 2019-01-04 | End: 2019-02-02

## 2019-01-04 RX ORDER — FEXOFENADINE HCL 30 MG
1 TABLET ORAL
Qty: 0 | Refills: 0 | COMMUNITY

## 2019-01-04 RX ORDER — ALPRAZOLAM 0.25 MG
1 TABLET ORAL
Qty: 6 | Refills: 0
Start: 2019-01-04 | End: 2019-01-06

## 2019-01-04 RX ORDER — IPRATROPIUM/ALBUTEROL SULFATE 18-103MCG
3 AEROSOL WITH ADAPTER (GRAM) INHALATION
Qty: 1 | Refills: 0
Start: 2019-01-04 | End: 2019-02-02

## 2019-01-04 RX ORDER — FLUTICASONE PROPIONATE AND SALMETEROL 50; 250 UG/1; UG/1
1 POWDER ORAL; RESPIRATORY (INHALATION)
Qty: 1 | Refills: 0
Start: 2019-01-04 | End: 2019-02-02

## 2019-01-04 RX ADMIN — Medication 3 MILLILITER(S): at 01:26

## 2019-01-04 RX ADMIN — Medication 1 TABLET(S): at 11:30

## 2019-01-04 RX ADMIN — CEFTRIAXONE 1000 MILLIGRAM(S): 500 INJECTION, POWDER, FOR SOLUTION INTRAMUSCULAR; INTRAVENOUS at 09:07

## 2019-01-04 RX ADMIN — BUDESONIDE AND FORMOTEROL FUMARATE DIHYDRATE 2 PUFF(S): 160; 4.5 AEROSOL RESPIRATORY (INHALATION) at 08:15

## 2019-01-04 RX ADMIN — Medication 3 MILLILITER(S): at 15:44

## 2019-01-04 RX ADMIN — Medication 40 MILLIGRAM(S): at 05:45

## 2019-01-04 RX ADMIN — Medication 3 MILLILITER(S): at 13:40

## 2019-01-04 RX ADMIN — Medication 0.5 MILLIGRAM(S): at 16:05

## 2019-01-04 RX ADMIN — Medication 3 MILLILITER(S): at 08:15

## 2019-01-04 NOTE — PROGRESS NOTE ADULT - ASSESSMENT
- asthma exacerbation slowly improving with the wheezing and peak flow   - allergic rhinitis   - known patient with the sleep apnea treated with maxially and mandibular advancement 2 years ago     PLAN     - change of iv steroids to po prednisone   - nasal spray for the pnd drip.  - check the sat on room air and with ambulation   - He will continue with the advair and allergra   - symptomatic relief for the cough if stable and sat are acceptable dischange planning and will follow up with his pulmonologist upon discharge

## 2019-01-04 NOTE — PROGRESS NOTE ADULT - ASSESSMENT
Dyspnea, Cough and Wheezing 2ndry to Acute on chronic Asthma Exacerbation  Leukocytosis most likely 2ndry to steroids  -Failed outpatient steroids and ABX  -CXR clear  -IVF  -taper Solumedrol 40mg IV q12  -Nebs ATC and PRN  -Rocephin#2  -Hycodan  -Symbicort  -Pulm consult appreciated   -MOntior WBC and FU peak Flow    Anxiety  -D/C propanolol as it may worsen his asthma; FU wiht Psych as outpatient   -Xanax QD PRN    DVT ppx  Ambulation/SCDs Acute Hypoxic Respiratory failure  Dyspnea, Cough and Wheezing 2ndry to Acute on chronic Asthma Exacerbation  Leukocytosis most likely 2ndry to steroids  -Failed outpatient steroids and ABX  -CXR clear  -taper Solumedrol 40mg IV q12  -Nebs ATC and PRN  -Rocephin#2  -Hycodan  -Symbicort  -Pulm consult appreciated   -MOntior WBC and FU peak Flow    Anxiety  -D/C propanolol as it may worsen his asthma; FU wiht Psych as outpatient   -Xanax QD PRN    DVT ppx  Ambulation/SCDs

## 2019-01-04 NOTE — DISCHARGE NOTE ADULT - MEDICATION SUMMARY - MEDICATIONS TO TAKE
I will START or STAY ON the medications listed below when I get home from the hospital:    predniSONE 10 mg oral tablet  -- 4 tab PO QD X 3 day   3 tab PO QD X 3 day  2 tab PO QD X 3 day  1 tab PO QD X 3 dayMDD:4 tabs  -- It is very important that you take or use this exactly as directed.  Do not skip doses or discontinue unless directed by your doctor.  Obtain medical advice before taking any non-prescription drugs as some may affect the action of this medication.  Take with food or milk.    -- Indication: For Asthma exacerbation    ALPRAZolam 0.25 mg oral tablet  -- 1 tab(s) by mouth 2 times a day, As Needed -for anxiety  -for mild pain MDD:2   -- Avoid grapefruit and grapefruit juice while taking this medication.  Caution federal law prohibits the transfer of this drug to any person other  than the person for whom it was prescribed.  Do not take this drug if you are pregnant.  May cause drowsiness.  Alcohol may intensify this effect.  Use care when operating dangerous machinery.    -- Indication: For Anxiety    Advair Diskus 500 mcg-50 mcg inhalation powder  -- 1 puff(s) inhaled 2 times a day  -- Indication: For Asthma exacerbation    ProAir HFA 90 mcg/inh inhalation aerosol  -- 1 inhaler(s) inhaled every 6 hours, As Needed -for shortness of breath and/or wheezing   -- Indication: For Asthma exacerbation    ipratropium-albuterol 0.5 mg-2.5 mg/3 mLinhalation solution  -- 3 milliliter(s) by nebulizer every 6 hours, As Needed -for shortness of breath and/or wheezing   -- For inhalation only.  It is very important that you take or use this exactly as directed.  Do not skip doses or discontinue unless directed by your doctor.  Obtain medical advice before taking any non-prescription drugs as some may affect the action of this medication.    -- Indication: For Asthma exacerbation

## 2019-01-04 NOTE — DISCHARGE NOTE ADULT - CARE PLAN
Principal Discharge DX:	Asthma, unspecified asthma severity, unspecified whether complicated, unspecified whether persistent  Goal:	Dyspnea, Cough and Wheezing 2ndry to Acute on chronic Asthma Exacerbation  Assessment and plan of treatment:	continue steroid taper; nebs and advair; FU with pulmonologist; Stop propanolol  Goal:	anxiety  Assessment and plan of treatment:	Stop propanolol due to asthma fu with psych on sunday to decide on another medication; continue Xanax as needed; Discussed with patient and family about benzo dependence and would avoid.

## 2019-01-04 NOTE — PROGRESS NOTE ADULT - SUBJECTIVE AND OBJECTIVE BOX
HOSPITALIST PROGRESS NOTE:  SUBJECTIVE:  PCP:  Chief Complaint: Patient is a 23y old  Male who presents with a chief complaint of Dyspnea and wheezing (03 Jan 2019 14:10)      HPI:  24 y/o male with PMHx of eczema, asthma presents to the ED with family at bedside c/o asthma exacerbation. Pt states he had a URI x1 month ago, treated with Z-magnolia, Amoxicillin, and x1 week of Prednisone with some improvement to sx but since stopping medications has been experiencing worsening and more frequent asthma exacerbations. Today, pt reports asthma exacerbation with SOB and Spo2 of 91% room air. Pt also reports +cough, +congestion. Pt has been taking albuterol treatments multiple times daily at home with minimal relief of symptoms. Pt reports history of 2 hospitalizations in the past for asthma, 1 admission to ICU, last visit was one year ago. No history of intubation for asthma exacerbation. Scheduled for appointment with pulmonologist next week. Denies fever, chills, n/v, abd pain, skin rash, body aches;   In the ER, patient received Solumedrol, Mg and Albuiterol. Even though he currently feels better and would like to go home, he also feels that his lungs is "tired out"; Pulse ox in the ER dips to the low 90s; currently vitals stable     1/3: Above reviewed; this morning patient c/o CP and cough;  Pulse ox on ambulation in the low 90s  1/4:  Patient has no complaints.  wants to go home.  Peak Flow 700    Allergies:  dust (Hives)  grass, trees, cats, dogs, horses, ragweed (Other)  No Known Drug Allergies    REVIEW OF SYSTEMS:  See HPI. All other review of systems is negative unless indicated above.     OBJECTIVE  Physical Exam:  Vital Signs Last 24 Hrs  T(C): 36.2 (04 Jan 2019 10:20), Max: 36.8 (03 Jan 2019 20:00)  T(F): 97.2 (04 Jan 2019 10:20), Max: 98.2 (03 Jan 2019 20:00)  HR: 115 (04 Jan 2019 10:20) (73 - 115)  BP: 129/64 (04 Jan 2019 10:20) (107/50 - 129/64)  BP(mean): --  RR: 18 (04 Jan 2019 10:20) (17 - 18)  SpO2: 93% (04 Jan 2019 10:20) (93% - 97%)    Constitutional: NAD, awake and alert, well-developed  Neurological: AAO x 3, no focal deficits  HEENT: PERRLA, EOMI, MMM  Neck: Soft and supple, No LAD, No JVD  Respiratory: Breath sounds are clear bilaterally, No rales or rhonchi +Mild wheezing B/L  Cardiovascular: S1 and S2, regular rate and rhythm; no Murmurs, gallops or rubs  Gastrointestinal: Bowel Sounds present, soft, nontender, nondistended, no guarding, no rebound tenderness  Back: No CVA tenderness   Extremities: No peripheral edema  Vascular: 2+ peripheral pulses  Musculoskeletal: 5/5 strength b/l upper and lower extremities  Skin: No rashes  Breast: Deferred  Rectal: Deferred    MEDICATIONS  (STANDING):  ALBUTerol/ipratropium for Nebulization 3 milliLiter(s) Nebulizer every 6 hours  buDESOnide 160 MICROgram(s)/formoterol 4.5 MICROgram(s) Inhaler 2 Puff(s) Inhalation two times a day  cefTRIAXone Injectable. 1000 milliGRAM(s) IV Push every 24 hours  methylPREDNISolone sodium succinate Injectable 40 milliGRAM(s) IV Push every 12 hours  multivitamin 1 Tablet(s) Oral daily  sodium chloride 0.9%. 1000 milliLiter(s) (75 mL/Hr) IV Continuous <Continuous>      Lab Results:  CBC  CBC Full  -  ( 04 Jan 2019 07:12 )  WBC Count : 12.15 K/uL  Hemoglobin : 14.2 g/dL  Hematocrit : 41.7 %  Platelet Count - Automated : 175 K/uL  Mean Cell Volume : 92.7 fl  Mean Cell Hemoglobin : 31.6 pg  Mean Cell Hemoglobin Concentration : 34.1 gm/dL  Auto Neutrophil # : x  Auto Lymphocyte # : x  Auto Monocyte # : x  Auto Eosinophil # : x  Auto Basophil # : x  Auto Neutrophil % : x  Auto Lymphocyte % : x  Auto Monocyte % : x  Auto Eosinophil % : x  Auto Basophil % : x    .		Differential:	[] Automated		[] Manual  Chemistry                        14.2   12.15 )-----------( 175      ( 04 Jan 2019 07:12 )             41.7     01-04    142  |  107  |  15  ----------------------------<  97  3.8   |  26  |  0.96    Ca    8.3<L>      04 Jan 2019 07:12  Phos  4.1     01-04  Mg     2.0     01-04      RADIOLOGY/EKG:    < from: Xray Chest 1 View- PORTABLE-Urgent (01.01.19 @ 19:33) >    IMPRESSION:    No infiltrate.    < end of copied text >

## 2019-01-04 NOTE — DISCHARGE NOTE ADULT - INSTRUCTIONS
Follow up with MD in 1 week. Continue with prednisone taper. Return to hospital if you develop increased shortness of breath, difficulty breathing or increasing need for rescure inhalers. Follow up with MD in 1 week. Continue with prednisone taper. Return to hospital if you develop increased shortness of breath, difficulty breathing or increasing need for rescue inhalers.

## 2019-01-04 NOTE — DISCHARGE NOTE ADULT - MEDICATION SUMMARY - MEDICATIONS TO STOP TAKING
I will STOP taking the medications listed below when I get home from the hospital:    propranolol 20 mg oral tablet  -- 1 tab(s) by mouth 2 times a day

## 2019-01-04 NOTE — PROGRESS NOTE ADULT - SUBJECTIVE AND OBJECTIVE BOX
SUBJECTIVE   patient has some cough with improvement in the wheeze and peak flow   has no fever and not wearing the 02 at rest     PAST MEDICAL & SURGICAL HISTORY:  Eczema  Asthma  Deviated nasal septum  Atopic dermatitis  Mitral valve disorder: cleft in mitral valve - congenital, asymptomatic  Sleep apnea: resolved, s/p Mandibular advancement with angioplasty (7/2016)  Eczema  Asthma: history of ICU admission for Exacerbation in 2017), no history of intubations  SETH (obstructive sleep apnea): Mandibular advancement with angioplasty 7/2016    OBJECTIVE   Vital Signs Last 24 Hrs  T(C): 36.6 (04 Jan 2019 05:02), Max: 36.8 (03 Jan 2019 11:53)  T(F): 97.8 (04 Jan 2019 05:02), Max: 98.2 (03 Jan 2019 11:53)  HR: 80 (04 Jan 2019 08:15) (73 - 106)  BP: 107/50 (04 Jan 2019 05:02) (107/50 - 124/66)  BP(mean): --  RR: 17 (04 Jan 2019 05:02) (17 - 19)  SpO2: 93% (04 Jan 2019 05:02) (92% - 97%)    PHYSICAL EXAM:  Constitutional: , awake and alert, not in distress.  HEENT: Normo cephalic atraumatic  Neck: Soft and supple, No J.V.D   Respiratory: vesicular breathing ,minimal wheeze on exam   Cardiovascular: S1 and S2, regular rate .   Gastrointestinal:  soft, nontender,   Extremities: No  edema or calf tenderness .  Neurological: No new  focal deficits.    MEDICATIONS  (STANDING):  ALBUTerol/ipratropium for Nebulization 3 milliLiter(s) Nebulizer every 6 hours  buDESOnide 160 MICROgram(s)/formoterol 4.5 MICROgram(s) Inhaler 2 Puff(s) Inhalation two times a day  cefTRIAXone Injectable. 1000 milliGRAM(s) IV Push every 24 hours  methylPREDNISolone sodium succinate Injectable 40 milliGRAM(s) IV Push every 12 hours  multivitamin 1 Tablet(s) Oral daily  sodium chloride 0.9%. 1000 milliLiter(s) (75 mL/Hr) IV Continuous <Continuous>                            14.2   12.15 )-----------( 175      ( 04 Jan 2019 07:12 )             41.7     01-04    142  |  107  |  15  ----------------------------<  97  3.8   |  26  |  0.96    Ca    8.3<L>      04 Jan 2019 07:12  Phos  4.1     01-04  Mg     2.0     01-04

## 2019-01-04 NOTE — DISCHARGE NOTE ADULT - OTHER SIGNIFICANT FINDINGS
Lab Results:  CBC  CBC Full  -  ( 04 Jan 2019 07:12 )  WBC Count : 12.15 K/uL  Hemoglobin : 14.2 g/dL  Hematocrit : 41.7 %  Platelet Count - Automated : 175 K/uL  Mean Cell Volume : 92.7 fl  Mean Cell Hemoglobin : 31.6 pg  Mean Cell Hemoglobin Concentration : 34.1 gm/dL  Auto Neutrophil # : x  Auto Lymphocyte # : x  Auto Monocyte # : x  Auto Eosinophil # : x  Auto Basophil # : x  Auto Neutrophil % : x  Auto Lymphocyte % : x  Auto Monocyte % : x  Auto Eosinophil % : x  Auto Basophil % : x    .		Differential:	[] Automated		[] Manual  Chemistry                        14.2   12.15 )-----------( 175      ( 04 Jan 2019 07:12 )             41.7     01-04    142  |  107  |  15  ----------------------------<  97  3.8   |  26  |  0.96    Ca    8.3<L>      04 Jan 2019 07:12  Phos  4.1     01-04  Mg     2.0     01-04      RADIOLOGY/EKG:    < from: Xray Chest 1 View- PORTABLE-Urgent (01.01.19 @ 19:33) >    IMPRESSION:    No infiltrate.    < end of copied text >

## 2019-01-04 NOTE — DISCHARGE NOTE ADULT - HOSPITAL COURSE
HOSPITALIST PROGRESS NOTE:  SUBJECTIVE:  PCP:  Chief Complaint: Patient is a 23y old  Male who presents with a chief complaint of Dyspnea and wheezing (03 Jan 2019 14:10)      HPI:  24 y/o male with PMHx of eczema, asthma presents to the ED with family at bedside c/o asthma exacerbation. Pt states he had a URI x1 month ago, treated with Z-magnolia, Amoxicillin, and x1 week of Prednisone with some improvement to sx but since stopping medications has been experiencing worsening and more frequent asthma exacerbations. Today, pt reports asthma exacerbation with SOB and Spo2 of 91% room air. Pt also reports +cough, +congestion. Pt has been taking albuterol treatments multiple times daily at home with minimal relief of symptoms. Pt reports history of 2 hospitalizations in the past for asthma, 1 admission to ICU, last visit was one year ago. No history of intubation for asthma exacerbation. Scheduled for appointment with pulmonologist next week. Denies fever, chills, n/v, abd pain, skin rash, body aches;   In the ER, patient received Solumedrol, Mg and Albuiterol. Even though he currently feels better and would like to go home, he also feels that his lungs is "tired out"; Pulse ox in the ER dips to the low 90s; currently vitals stable     1/3: Above reviewed; this morning patient c/o CP and cough;  Pulse ox on ambulation in the low 90s  1/4:  Patient has no complaints.  wants to go home.  Peak Flow 700    Dyspnea, Cough and Wheezing 2ndry to Acute on chronic Asthma Exacerbation  Leukocytosis most likely 2ndry to steroids  -Failed outpatient steroids and ABX  -CXR clear  -IVF  -taper Solumedrol 40mg IV q12  -Nebs ATC and PRN  -Rocephin#2  -Hycodan  -Symbicort  -Pulm consult appreciated   -MOntior WBC and FU peak Flow    Anxiety  -D/C propanolol as it may worsen his asthma; FU wiht Psych as outpatient   -Xanax QD PRN    DVT ppx  Ambulation/SCDs    total time 35 minutes

## 2019-01-04 NOTE — PHARMACOTHERAPY INTERVENTION NOTE - COMMENTS
completed med rec on 5S. Patient was started on propranolol 20 mg BID approximately 4 weeks ago which may potentially cause bronchospasm. Medication d/c for now.
course completed

## 2019-01-04 NOTE — DISCHARGE NOTE ADULT - PLAN OF CARE
Dyspnea, Cough and Wheezing 2ndry to Acute on chronic Asthma Exacerbation continue steroid taper; nebs and advair; FU with pulmonologist; Stop propanolol anxiety Stop propanolol due to asthma fu with psych on sunday to decide on another medication; continue Xanax as needed; Discussed with patient and family about benzo dependence and would avoid.

## 2019-01-04 NOTE — DISCHARGE NOTE ADULT - CARE PROVIDER_API CALL
FU with PCP, Pulmonologist and psychiatrist,   Phone: (   )    -  Fax: (   )    -    Sally Allen (MD), Critical Care Medicine; Internal Medicine; Pulmonary Disease; Sleep Medicine  [address] [place] [address]  Phone: [phone]  Fax: [phone]

## 2019-01-04 NOTE — DISCHARGE NOTE ADULT - PROVIDER TOKENS
FREE:[LAST:[FU with PCP, Pulmonologist and psychiatrist],PHONE:[(   )    -],FAX:[(   )    -]],TOKEN:'[id number]'

## 2019-01-08 DIAGNOSIS — T38.0X5A ADVERSE EFFECT OF GLUCOCORTICOIDS AND SYNTHETIC ANALOGUES, INITIAL ENCOUNTER: ICD-10-CM

## 2019-01-08 DIAGNOSIS — D72.829 ELEVATED WHITE BLOOD CELL COUNT, UNSPECIFIED: ICD-10-CM

## 2019-01-08 DIAGNOSIS — Q24.8 OTHER SPECIFIED CONGENITAL MALFORMATIONS OF HEART: ICD-10-CM

## 2019-01-08 DIAGNOSIS — J45.901 UNSPECIFIED ASTHMA WITH (ACUTE) EXACERBATION: ICD-10-CM

## 2019-01-08 DIAGNOSIS — F41.9 ANXIETY DISORDER, UNSPECIFIED: ICD-10-CM

## 2019-01-08 DIAGNOSIS — R06.00 DYSPNEA, UNSPECIFIED: ICD-10-CM

## 2019-01-08 DIAGNOSIS — J96.01 ACUTE RESPIRATORY FAILURE WITH HYPOXIA: ICD-10-CM

## 2019-05-20 PROBLEM — L30.9 DERMATITIS, UNSPECIFIED: Chronic | Status: ACTIVE | Noted: 2019-01-01

## 2019-05-20 PROBLEM — J45.909 UNSPECIFIED ASTHMA, UNCOMPLICATED: Chronic | Status: ACTIVE | Noted: 2019-01-01

## 2019-05-30 NOTE — ASU PATIENT PROFILE, ADULT - PMH
Asthma    Asthma  history of ICU admission for Exacerbation in 2017), no history of intubations  Atopic dermatitis    Deviated nasal septum    Eczema    Eczema    Esophageal diverticulum    GERD (gastroesophageal reflux disease)    Mitral valve disorder  cleft in mitral valve - congenital, asymptomatic  RAD (reactive airway disease)    Sleep apnea  resolved, s/p Mandibular advancement with angioplasty (7/2016)

## 2019-05-31 ENCOUNTER — OUTPATIENT (OUTPATIENT)
Dept: OUTPATIENT SERVICES | Facility: HOSPITAL | Age: 24
LOS: 1 days | Discharge: HOME | End: 2019-05-31
Payer: COMMERCIAL

## 2019-05-31 ENCOUNTER — RESULT REVIEW (OUTPATIENT)
Age: 24
End: 2019-05-31

## 2019-05-31 VITALS
SYSTOLIC BLOOD PRESSURE: 121 MMHG | WEIGHT: 210.1 LBS | TEMPERATURE: 98 F | DIASTOLIC BLOOD PRESSURE: 80 MMHG | OXYGEN SATURATION: 97 % | RESPIRATION RATE: 20 BRPM | HEIGHT: 66 IN | HEART RATE: 78 BPM

## 2019-05-31 DIAGNOSIS — G47.33 OBSTRUCTIVE SLEEP APNEA (ADULT) (PEDIATRIC): Chronic | ICD-10-CM

## 2019-05-31 DIAGNOSIS — Z98.890 OTHER SPECIFIED POSTPROCEDURAL STATES: Chronic | ICD-10-CM

## 2019-05-31 PROCEDURE — 88312 SPECIAL STAINS GROUP 1: CPT | Mod: 26

## 2019-05-31 PROCEDURE — 88305 TISSUE EXAM BY PATHOLOGIST: CPT | Mod: 26

## 2019-05-31 PROCEDURE — 88313 SPECIAL STAINS GROUP 2: CPT | Mod: 26

## 2019-05-31 RX ORDER — ALBUTEROL 90 UG/1
2 AEROSOL, METERED ORAL
Qty: 0 | Refills: 0 | DISCHARGE

## 2019-05-31 NOTE — ASU PREOP CHECKLIST - AS BP NONINV SITE
Curettage Text: The wound bed was treated with curettage after the biopsy was performed. Electrodesiccation Text: The wound bed was treated with electrodesiccation after the biopsy was performed. Dressing: bandage Bill [id number] For Specimen Handling/Conveyance To Laboratory?: no Biopsy Type: H and E Type Of Destruction Used: Curettage Cryotherapy Text: The wound bed was treated with cryotherapy after the biopsy was performed. Billing Type: Third-Party Bill Depth Of Biopsy: dermis Anesthesia Type: 1% lidocaine with 1:100,000 epinephrine and a 1:6 solution of 8.4% sodium bicarbonate Additional Anesthesia Volume In Cc (Will Not Render If 0): 0 Accession #: PC Hemostasis: Aluminum Chloride Silver Nitrate Text: The wound bed was treated with silver nitrate after the biopsy was performed. Path Notes (To The Dermatopathologist): 2 pieces in specimen container Wound Care: Petrolatum Electrodesiccation And Curettage Text: The wound bed was treated with electrodesiccation and curettage after the biopsy was performed. Was A Bandage Applied: Yes Anesthesia Volume In Cc: 0.5 Detail Level: Detailed Biopsy Method: Dermablade left upper arm

## 2019-06-05 LAB — SURGICAL PATHOLOGY STUDY: SIGNIFICANT CHANGE UP

## 2019-06-06 DIAGNOSIS — K29.50 UNSPECIFIED CHRONIC GASTRITIS WITHOUT BLEEDING: ICD-10-CM

## 2019-06-06 DIAGNOSIS — G47.33 OBSTRUCTIVE SLEEP APNEA (ADULT) (PEDIATRIC): ICD-10-CM

## 2019-06-06 DIAGNOSIS — R13.10 DYSPHAGIA, UNSPECIFIED: ICD-10-CM

## 2019-06-06 DIAGNOSIS — K22.10 ULCER OF ESOPHAGUS WITHOUT BLEEDING: ICD-10-CM

## 2019-06-06 DIAGNOSIS — R12 HEARTBURN: ICD-10-CM

## 2019-06-06 DIAGNOSIS — L30.9 DERMATITIS, UNSPECIFIED: ICD-10-CM

## 2019-06-25 ENCOUNTER — APPOINTMENT (OUTPATIENT)
Dept: DERMATOLOGY | Facility: CLINIC | Age: 24
End: 2019-06-25
Payer: COMMERCIAL

## 2019-06-25 PROBLEM — K21.9 GASTRO-ESOPHAGEAL REFLUX DISEASE WITHOUT ESOPHAGITIS: Chronic | Status: ACTIVE | Noted: 2019-05-30

## 2019-06-25 PROBLEM — Q39.6 CONGENITAL DIVERTICULUM OF ESOPHAGUS: Chronic | Status: ACTIVE | Noted: 2019-05-30

## 2019-06-25 PROBLEM — J45.909 UNSPECIFIED ASTHMA, UNCOMPLICATED: Chronic | Status: ACTIVE | Noted: 2019-05-30

## 2019-06-25 PROCEDURE — 96910 PHOTCHMTX TAR&UVB/PTRLTM&UVB: CPT

## 2019-06-25 PROCEDURE — 99243 OFF/OP CNSLTJ NEW/EST LOW 30: CPT | Mod: 25

## 2019-06-25 RX ORDER — BETAMETHASONE VALERATE FOAM 1.2 MG/G
0.12 AEROSOL, FOAM TOPICAL
Qty: 100 | Refills: 0 | Status: COMPLETED | COMMUNITY
Start: 2019-02-28

## 2019-06-25 RX ORDER — DUPILUMAB 300 MG/2ML
300 INJECTION, SOLUTION SUBCUTANEOUS
Qty: 1 | Refills: 0 | Status: DISCONTINUED | COMMUNITY
Start: 2017-05-31 | End: 2019-06-25

## 2019-06-25 RX ORDER — MELOXICAM 15 MG/1
15 TABLET ORAL
Qty: 30 | Refills: 0 | Status: DISCONTINUED | COMMUNITY
Start: 2016-11-23 | End: 2019-06-25

## 2019-06-25 RX ORDER — ATOMOXETINE 25 MG/1
25 CAPSULE ORAL
Qty: 30 | Refills: 0 | Status: ACTIVE | COMMUNITY
Start: 2019-04-28

## 2019-06-25 RX ORDER — GABAPENTIN 300 MG/1
300 CAPSULE ORAL
Qty: 60 | Refills: 0 | Status: ACTIVE | COMMUNITY
Start: 2019-03-17

## 2019-06-25 RX ORDER — FLUTICASONE PROPIONATE AND SALMETEROL 50; 250 UG/1; UG/1
250-50 POWDER RESPIRATORY (INHALATION)
Qty: 60 | Refills: 0 | Status: DISCONTINUED | COMMUNITY
Start: 2016-08-19 | End: 2019-06-25

## 2019-06-25 RX ORDER — AZITHROMYCIN 250 MG/1
250 TABLET, FILM COATED ORAL
Qty: 6 | Refills: 0 | Status: DISCONTINUED | COMMUNITY
Start: 2017-04-16 | End: 2019-06-25

## 2019-06-25 RX ORDER — PANTOPRAZOLE 40 MG/1
40 TABLET, DELAYED RELEASE ORAL
Qty: 60 | Refills: 0 | Status: ACTIVE | COMMUNITY
Start: 2019-06-10

## 2019-06-25 RX ORDER — ALPRAZOLAM 0.25 MG/1
0.25 TABLET ORAL
Qty: 6 | Refills: 0 | Status: COMPLETED | COMMUNITY
Start: 2019-01-04

## 2019-06-25 RX ORDER — LISDEXAMFETAMINE DIMESYLATE 20 MG/1
20 CAPSULE ORAL
Qty: 30 | Refills: 0 | Status: COMPLETED | COMMUNITY
Start: 2019-03-20

## 2019-06-25 RX ORDER — IPRATROPIUM BROMIDE AND ALBUTEROL SULFATE 2.5; .5 MG/3ML; MG/3ML
0.5-2.5 (3) SOLUTION RESPIRATORY (INHALATION)
Qty: 90 | Refills: 0 | Status: ACTIVE | COMMUNITY
Start: 2019-01-04

## 2019-06-25 RX ORDER — ESCITALOPRAM OXALATE 20 MG/1
20 TABLET ORAL
Qty: 30 | Refills: 0 | Status: ACTIVE | COMMUNITY
Start: 2019-04-28

## 2019-06-25 RX ORDER — DUPILUMAB 300 MG/2ML
300 INJECTION, SOLUTION SUBCUTANEOUS
Qty: 1 | Refills: 3 | Status: DISCONTINUED | COMMUNITY
Start: 2017-05-31 | End: 2019-06-25

## 2019-06-25 RX ORDER — NEOMYCIN SULFATE, POLYMYXIN B SULFATE AND DEXAMETHASONE 3.5; 10000; 1 MG/ML; [USP'U]/ML; MG/ML
3.5-10000-0.1 SUSPENSION OPHTHALMIC
Qty: 5 | Refills: 0 | Status: COMPLETED | COMMUNITY
Start: 2019-05-03

## 2019-06-25 RX ORDER — PREDNISONE 10 MG/1
10 TABLET ORAL
Qty: 30 | Refills: 0 | Status: COMPLETED | COMMUNITY
Start: 2019-01-04

## 2019-06-25 RX ORDER — RANITIDINE 150 MG/1
150 TABLET ORAL
Qty: 30 | Refills: 0 | Status: ACTIVE | COMMUNITY
Start: 2019-05-15

## 2019-06-25 RX ORDER — PREDNISONE 20 MG/1
20 TABLET ORAL
Qty: 10 | Refills: 0 | Status: COMPLETED | COMMUNITY
Start: 2019-02-15

## 2019-06-25 NOTE — DISCUSSION/SUMMARY
[FreeTextEntry1] :  The patient presents for UVB light therapy.\par The patient tolerated the last treatment well, without significant erythema or a burn.\par The patient confirmed that there are no new medications.\par The patient wearing the appropriate UV protective eye glasses.\par The treatment was administered as follows:\par     0.2    Joules. Maximum time  0    minutes and   18   seconds.\par \par \par Jacobsen#1\par \par The patient tolerated the treatment well and was instructed to notify the office if there is significant redness or a burn over the next 48 hours.\par

## 2019-06-27 ENCOUNTER — APPOINTMENT (OUTPATIENT)
Dept: DERMATOLOGY | Facility: CLINIC | Age: 24
End: 2019-06-27
Payer: COMMERCIAL

## 2019-06-27 PROCEDURE — 96910 PHOTCHMTX TAR&UVB/PTRLTM&UVB: CPT

## 2019-06-27 NOTE — DISCUSSION/SUMMARY
[FreeTextEntry1] : The patient presents for UVB therapy.\par The patient tolerated the last treatment well, without significant erythema or a burn.\par The patient confirmed that there are no new medications.\par The patient was wearing the appropriate UV protective eye glasses.\par The treatment was administered as follows:\par      0.3     Joules.     Maximum time       minutes and   .28     seconds.\par \par   \par Jacobsen # 1\par \par The patient tolerated the treatment well and was instructed to notify the office if there is a significant\par redness or a burn over the next 48 hours.\par \par

## 2019-07-02 ENCOUNTER — APPOINTMENT (OUTPATIENT)
Dept: DERMATOLOGY | Facility: CLINIC | Age: 24
End: 2019-07-02

## 2019-07-08 ENCOUNTER — APPOINTMENT (OUTPATIENT)
Dept: DERMATOLOGY | Facility: CLINIC | Age: 24
End: 2019-07-08

## 2019-07-22 ENCOUNTER — EMERGENCY (EMERGENCY)
Facility: HOSPITAL | Age: 24
LOS: 0 days | Discharge: HOME | End: 2019-07-22
Attending: EMERGENCY MEDICINE | Admitting: EMERGENCY MEDICINE
Payer: OTHER MISCELLANEOUS

## 2019-07-22 VITALS
RESPIRATION RATE: 18 BRPM | HEART RATE: 119 BPM | OXYGEN SATURATION: 98 % | TEMPERATURE: 98 F | SYSTOLIC BLOOD PRESSURE: 128 MMHG | HEIGHT: 69 IN | WEIGHT: 210.1 LBS | DIASTOLIC BLOOD PRESSURE: 75 MMHG

## 2019-07-22 VITALS
OXYGEN SATURATION: 95 % | RESPIRATION RATE: 18 BRPM | SYSTOLIC BLOOD PRESSURE: 130 MMHG | HEART RATE: 87 BPM | DIASTOLIC BLOOD PRESSURE: 85 MMHG

## 2019-07-22 DIAGNOSIS — Z91.81 HISTORY OF FALLING: ICD-10-CM

## 2019-07-22 DIAGNOSIS — Z98.890 OTHER SPECIFIED POSTPROCEDURAL STATES: Chronic | ICD-10-CM

## 2019-07-22 DIAGNOSIS — E86.0 DEHYDRATION: ICD-10-CM

## 2019-07-22 DIAGNOSIS — Z79.51 LONG TERM (CURRENT) USE OF INHALED STEROIDS: ICD-10-CM

## 2019-07-22 DIAGNOSIS — J45.909 UNSPECIFIED ASTHMA, UNCOMPLICATED: ICD-10-CM

## 2019-07-22 DIAGNOSIS — R55 SYNCOPE AND COLLAPSE: ICD-10-CM

## 2019-07-22 DIAGNOSIS — Q23.8 OTHER CONGENITAL MALFORMATIONS OF AORTIC AND MITRAL VALVES: ICD-10-CM

## 2019-07-22 DIAGNOSIS — G47.33 OBSTRUCTIVE SLEEP APNEA (ADULT) (PEDIATRIC): Chronic | ICD-10-CM

## 2019-07-22 DIAGNOSIS — R42 DIZZINESS AND GIDDINESS: ICD-10-CM

## 2019-07-22 LAB
ABO RH CONFIRMATION: SIGNIFICANT CHANGE UP
ALBUMIN SERPL ELPH-MCNC: 3.8 G/DL — SIGNIFICANT CHANGE UP (ref 3.3–5)
ALP SERPL-CCNC: 104 U/L — SIGNIFICANT CHANGE UP (ref 40–120)
ALT FLD-CCNC: 51 U/L — SIGNIFICANT CHANGE UP (ref 12–78)
ANION GAP SERPL CALC-SCNC: 13 MMOL/L — SIGNIFICANT CHANGE UP (ref 5–17)
APTT BLD: 28.3 SEC — SIGNIFICANT CHANGE UP (ref 27.5–36.3)
AST SERPL-CCNC: 35 U/L — SIGNIFICANT CHANGE UP (ref 15–37)
BASOPHILS # BLD AUTO: 0.11 K/UL — SIGNIFICANT CHANGE UP (ref 0–0.2)
BASOPHILS NFR BLD AUTO: 0.8 % — SIGNIFICANT CHANGE UP (ref 0–2)
BILIRUB SERPL-MCNC: 0.3 MG/DL — SIGNIFICANT CHANGE UP (ref 0.2–1.2)
BLD GP AB SCN SERPL QL: SIGNIFICANT CHANGE UP
BLOOD GAS SOURCE: SIGNIFICANT CHANGE UP
BUN SERPL-MCNC: 15 MG/DL — SIGNIFICANT CHANGE UP (ref 7–23)
CALCIUM SERPL-MCNC: 8.8 MG/DL — SIGNIFICANT CHANGE UP (ref 8.5–10.1)
CHLORIDE SERPL-SCNC: 104 MMOL/L — SIGNIFICANT CHANGE UP (ref 96–108)
CK SERPL-CCNC: 272 U/L — SIGNIFICANT CHANGE UP (ref 26–308)
CO2 SERPL-SCNC: 20 MMOL/L — LOW (ref 22–31)
COHGB MFR BLDV: 1.9 % — HIGH (ref 0–1.5)
CREAT SERPL-MCNC: 1.5 MG/DL — HIGH (ref 0.5–1.3)
EOSINOPHIL # BLD AUTO: 0.2 K/UL — SIGNIFICANT CHANGE UP (ref 0–0.5)
EOSINOPHIL NFR BLD AUTO: 1.5 % — SIGNIFICANT CHANGE UP (ref 0–6)
GLUCOSE SERPL-MCNC: 107 MG/DL — HIGH (ref 70–99)
HCT VFR BLD CALC: 43.7 % — SIGNIFICANT CHANGE UP (ref 39–50)
HGB BLD-MCNC: 14.7 G/DL — SIGNIFICANT CHANGE UP (ref 13–17)
IMM GRANULOCYTES NFR BLD AUTO: 0.7 % — SIGNIFICANT CHANGE UP (ref 0–1.5)
INR BLD: 0.98 RATIO — SIGNIFICANT CHANGE UP (ref 0.88–1.16)
LYMPHOCYTES # BLD AUTO: 2.85 K/UL — SIGNIFICANT CHANGE UP (ref 1–3.3)
LYMPHOCYTES # BLD AUTO: 20.9 % — SIGNIFICANT CHANGE UP (ref 13–44)
MCHC RBC-ENTMCNC: 30 PG — SIGNIFICANT CHANGE UP (ref 27–34)
MCHC RBC-ENTMCNC: 33.6 GM/DL — SIGNIFICANT CHANGE UP (ref 32–36)
MCV RBC AUTO: 89.2 FL — SIGNIFICANT CHANGE UP (ref 80–100)
MONOCYTES # BLD AUTO: 1.29 K/UL — HIGH (ref 0–0.9)
MONOCYTES NFR BLD AUTO: 9.5 % — SIGNIFICANT CHANGE UP (ref 2–14)
NEUTROPHILS # BLD AUTO: 9.09 K/UL — HIGH (ref 1.8–7.4)
NEUTROPHILS NFR BLD AUTO: 66.6 % — SIGNIFICANT CHANGE UP (ref 43–77)
PLATELET # BLD AUTO: 221 K/UL — SIGNIFICANT CHANGE UP (ref 150–400)
POTASSIUM SERPL-MCNC: 3.4 MMOL/L — LOW (ref 3.5–5.3)
POTASSIUM SERPL-SCNC: 3.4 MMOL/L — LOW (ref 3.5–5.3)
PROT SERPL-MCNC: 7.7 GM/DL — SIGNIFICANT CHANGE UP (ref 6–8.3)
PROTHROM AB SERPL-ACNC: 10.9 SEC — SIGNIFICANT CHANGE UP (ref 10–12.9)
RBC # BLD: 4.9 M/UL — SIGNIFICANT CHANGE UP (ref 4.2–5.8)
RBC # FLD: 12.9 % — SIGNIFICANT CHANGE UP (ref 10.3–14.5)
SODIUM SERPL-SCNC: 137 MMOL/L — SIGNIFICANT CHANGE UP (ref 135–145)
TROPONIN I SERPL-MCNC: <0.015 NG/ML — SIGNIFICANT CHANGE UP (ref 0.01–0.04)
WBC # BLD: 13.64 K/UL — HIGH (ref 3.8–10.5)
WBC # FLD AUTO: 13.64 K/UL — HIGH (ref 3.8–10.5)

## 2019-07-22 PROCEDURE — 70450 CT HEAD/BRAIN W/O DYE: CPT | Mod: 26

## 2019-07-22 PROCEDURE — 99285 EMERGENCY DEPT VISIT HI MDM: CPT

## 2019-07-22 PROCEDURE — 71260 CT THORAX DX C+: CPT | Mod: 26

## 2019-07-22 PROCEDURE — 74177 CT ABD & PELVIS W/CONTRAST: CPT | Mod: 26

## 2019-07-22 PROCEDURE — 72125 CT NECK SPINE W/O DYE: CPT | Mod: 26

## 2019-07-22 PROCEDURE — 99053 MED SERV 10PM-8AM 24 HR FAC: CPT

## 2019-07-22 RX ORDER — IPRATROPIUM/ALBUTEROL SULFATE 18-103MCG
3 AEROSOL WITH ADAPTER (GRAM) INHALATION ONCE
Refills: 0 | Status: COMPLETED | OUTPATIENT
Start: 2019-07-22 | End: 2019-07-22

## 2019-07-22 RX ORDER — SODIUM CHLORIDE 9 MG/ML
1000 INJECTION INTRAMUSCULAR; INTRAVENOUS; SUBCUTANEOUS ONCE
Refills: 0 | Status: COMPLETED | OUTPATIENT
Start: 2019-07-22 | End: 2019-07-22

## 2019-07-22 RX ORDER — POTASSIUM CHLORIDE 20 MEQ
20 PACKET (EA) ORAL ONCE
Refills: 0 | Status: COMPLETED | OUTPATIENT
Start: 2019-07-22 | End: 2019-07-22

## 2019-07-22 RX ADMIN — Medication 20 MILLIEQUIVALENT(S): at 04:15

## 2019-07-22 RX ADMIN — SODIUM CHLORIDE 1000 MILLILITER(S): 9 INJECTION INTRAMUSCULAR; INTRAVENOUS; SUBCUTANEOUS at 02:27

## 2019-07-22 RX ADMIN — SODIUM CHLORIDE 1000 MILLILITER(S): 9 INJECTION INTRAMUSCULAR; INTRAVENOUS; SUBCUTANEOUS at 03:24

## 2019-07-22 RX ADMIN — SODIUM CHLORIDE 1000 MILLILITER(S): 9 INJECTION INTRAMUSCULAR; INTRAVENOUS; SUBCUTANEOUS at 04:20

## 2019-07-22 RX ADMIN — SODIUM CHLORIDE 1000 MILLILITER(S): 9 INJECTION INTRAMUSCULAR; INTRAVENOUS; SUBCUTANEOUS at 03:27

## 2019-07-22 RX ADMIN — Medication 3 MILLILITER(S): at 04:15
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
## 2019-07-22 NOTE — ED ADULT NURSE NOTE - NSIMPLEMENTINTERV_GEN_ALL_ED
Implemented All Universal Safety Interventions:  [place] to call system. Call bell, personal items and telephone within reach. Instruct patient to call for assistance. Room bathroom lighting operational. Non-slip footwear when patient is off stretcher. Physically safe environment: no spills, clutter or unnecessary equipment. Stretcher in lowest position, wheels locked, appropriate side rails in place.

## 2019-07-22 NOTE — ED PROVIDER NOTE - SECONDARY DIAGNOSIS.
Fall down stairs, initial encounter Mild dehydration Asthma, unspecified asthma severity, unspecified whether complicated, unspecified whether persistent
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
## 2019-07-22 NOTE — ED PROVIDER NOTE - CONSTITUTIONAL, MLM
normal... Well appearing, well nourished, awake, alert, oriented to person, place, time/situation and in no apparent distress. Speaking full sentences; anxious

## 2019-07-22 NOTE — ED PROVIDER NOTE - PROGRESS NOTE DETAILS
Pt. feeling so much better.  IVFs and oxygen helped with symptoms completely.  Parents are here to bring him home.  Pt. has inhaler at home if feeling wheezing, coughing or trouble breathing.
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
## 2019-07-22 NOTE — ED PROVIDER NOTE - CARE PLAN
Principal Discharge DX:	Syncope and collapse  Secondary Diagnosis:	Fall down stairs, initial encounter  Secondary Diagnosis:	Mild dehydration  Secondary Diagnosis:	Asthma, unspecified asthma severity, unspecified whether complicated, unspecified whether persistent

## 2019-11-14 ENCOUNTER — TRANSCRIPTION ENCOUNTER (OUTPATIENT)
Age: 24
End: 2019-11-14

## 2019-12-15 NOTE — DISCHARGE NOTE ADULT - PATIENT PORTAL LINK FT
30w1d You can access the [facility] Patient Portal, offered by [facility], by registering with the following website: [email or web address]

## 2020-03-03 ENCOUNTER — APPOINTMENT (OUTPATIENT)
Dept: DERMATOLOGY | Facility: CLINIC | Age: 25
End: 2020-03-03
Payer: COMMERCIAL

## 2020-03-03 VITALS — HEIGHT: 66 IN | BODY MASS INDEX: 32.14 KG/M2 | WEIGHT: 200 LBS

## 2020-03-03 PROCEDURE — 99242 OFF/OP CONSLTJ NEW/EST SF 20: CPT | Mod: 25

## 2020-03-03 PROCEDURE — 96910 PHOTCHMTX TAR&UVB/PTRLTM&UVB: CPT

## 2020-03-03 RX ORDER — MEPOLIZUMAB 100 MG/ML
100 INJECTION, POWDER, FOR SOLUTION SUBCUTANEOUS
Qty: 3 | Refills: 0 | Status: DISCONTINUED | COMMUNITY
Start: 2019-04-18 | End: 2020-03-03

## 2020-03-03 RX ORDER — TIOTROPIUM BROMIDE INHALATION SPRAY 3.12 UG/1
2.5 SPRAY, METERED RESPIRATORY (INHALATION)
Qty: 4 | Refills: 0 | Status: DISCONTINUED | COMMUNITY
Start: 2019-03-27 | End: 2020-03-03

## 2020-03-03 RX ORDER — ESCITALOPRAM OXALATE 10 MG/1
10 TABLET ORAL
Qty: 30 | Refills: 0 | Status: DISCONTINUED | COMMUNITY
Start: 2019-03-17 | End: 2020-03-03

## 2020-03-03 RX ORDER — ESCITALOPRAM OXALATE 5 MG/1
5 TABLET ORAL
Qty: 30 | Refills: 0 | Status: DISCONTINUED | COMMUNITY
Start: 2019-03-17 | End: 2020-03-03

## 2020-03-03 RX ORDER — HYDROXYZINE HYDROCHLORIDE 50 MG/1
50 TABLET ORAL
Qty: 30 | Refills: 0 | Status: DISCONTINUED | COMMUNITY
Start: 2019-06-16 | End: 2020-03-03

## 2020-03-05 NOTE — DISCUSSION/SUMMARY
[FreeTextEntry1] : The patient presents for UVB light therapy.\par The patient tolerated the last treatment well, without significant erythema or a burn.\par The patient confirmed that there are no new medications.\par The patient was wearing the appropriate UV protective eye glasses.\par The treatment was administered as follows:\par  0.2   Joules.  Maximum time   0    minutes and   18     seconds.\par \par Jacobsen # 1\par \par The patient tolerated the treatment well and was instructed to notify the office if there is significant redness or a burn over the next 48 hours.\par

## 2020-03-06 ENCOUNTER — APPOINTMENT (OUTPATIENT)
Dept: DERMATOLOGY | Facility: CLINIC | Age: 25
End: 2020-03-06

## 2020-03-12 ENCOUNTER — APPOINTMENT (OUTPATIENT)
Dept: DERMATOLOGY | Facility: CLINIC | Age: 25
End: 2020-03-12
Payer: COMMERCIAL

## 2020-03-12 PROCEDURE — 96910 PHOTCHMTX TAR&UVB/PTRLTM&UVB: CPT

## 2020-03-14 ENCOUNTER — APPOINTMENT (OUTPATIENT)
Dept: DERMATOLOGY | Facility: CLINIC | Age: 25
End: 2020-03-14
Payer: COMMERCIAL

## 2020-03-14 PROCEDURE — 96910 PHOTCHMTX TAR&UVB/PTRLTM&UVB: CPT

## 2020-03-16 ENCOUNTER — APPOINTMENT (OUTPATIENT)
Dept: DERMATOLOGY | Facility: CLINIC | Age: 25
End: 2020-03-16

## 2020-03-16 NOTE — DISCUSSION/SUMMARY
[FreeTextEntry1] : The patient presents for UVB therapy.\par The patient tolerated the last treatment well, without significant erythema or a burn.\par The patient confirmed that there are no new medications.\par The patient was wearing the appropriate UV protective eye glasses.\par The treatment was administered as follows:\par       0.2    Joules.     Maximum time   0    minutes and   18     seconds.\par \par   \par Jacobsen # 1\par \par The patient tolerated the treatment well and was instructed to notify the office if there is a significant\par redness or a burn over the next 48 hours.\par

## 2020-03-16 NOTE — DISCUSSION/SUMMARY
[FreeTextEntry1] : The patient presents for UVB light therapy.\par The patient tolerated the last treatment well, without significant erythema or a burn.\par The patient confirmed that there are no new medications.\par The patient wearing the appropriate UV protective eye glasses.\par The treatment was administered as follows:\par 0.4  Joules.  Maximum time  0  minutes and  37  seconds.\par \par Jacobsen #:  1\par \par The patient tolerated the treatment well and was instructed to notify the office if there is significant redness or a burn over the next 48 hours.\par \par \par

## 2020-03-18 ENCOUNTER — APPOINTMENT (OUTPATIENT)
Dept: DERMATOLOGY | Facility: CLINIC | Age: 25
End: 2020-03-18
Payer: COMMERCIAL

## 2020-03-18 PROCEDURE — 96910 PHOTCHMTX TAR&UVB/PTRLTM&UVB: CPT

## 2020-03-20 ENCOUNTER — APPOINTMENT (OUTPATIENT)
Dept: DERMATOLOGY | Facility: CLINIC | Age: 25
End: 2020-03-20

## 2020-03-20 NOTE — DISCUSSION/SUMMARY
[FreeTextEntry1] : The patient presents for UVB light therapy.\par The patient tolerated the last treatment well, without significant erythema or a burn.\par The patient confirmed that there are no new medications.\par The patient was wearing the appropriate UV protective eye glasses. \par      0.6   Joules.  Maximum Time  0    Minutes and   56  Seconds.\par \par \par Jacobsen #1\par \par \par The patient tolerated treatment well and was instructed to notify the office if there is significant \par redness or a burn over the next 48 hours\par

## 2020-05-29 ENCOUNTER — APPOINTMENT (OUTPATIENT)
Dept: DERMATOLOGY | Facility: CLINIC | Age: 25
End: 2020-05-29
Payer: COMMERCIAL

## 2020-05-29 PROCEDURE — 96910 PHOTCHMTX TAR&UVB/PTRLTM&UVB: CPT

## 2020-06-01 ENCOUNTER — APPOINTMENT (OUTPATIENT)
Dept: DERMATOLOGY | Facility: CLINIC | Age: 25
End: 2020-06-01

## 2020-06-01 NOTE — DISCUSSION/SUMMARY
[FreeTextEntry1] : The patient presents for UVB light therapy.\par The patient tolerated the last treatment well, without significant erythema or a burn.\par The patient confirmed that there are no new medications. \par The patient was wearing the appropriate UV protective eye glasses.\par The treatment was administrated as follows:\par \par       0.6   Joules.   Maximum time        0   minutes and       56       seconds.\par \par Jacobsen# 1\par \par The patient tolerated the treatment well and was instructed to notify the office of there is significant redness or a burn over the next 48 hours.\par

## 2020-06-03 ENCOUNTER — APPOINTMENT (OUTPATIENT)
Dept: DERMATOLOGY | Facility: CLINIC | Age: 25
End: 2020-06-03
Payer: COMMERCIAL

## 2020-06-03 PROCEDURE — 96910 PHOTCHMTX TAR&UVB/PTRLTM&UVB: CPT

## 2020-06-05 ENCOUNTER — APPOINTMENT (OUTPATIENT)
Dept: DERMATOLOGY | Facility: CLINIC | Age: 25
End: 2020-06-05
Payer: COMMERCIAL

## 2020-06-05 PROCEDURE — 96910 PHOTCHMTX TAR&UVB/PTRLTM&UVB: CPT

## 2020-06-05 NOTE — DISCUSSION/SUMMARY
[FreeTextEntry1] : The patient presents for UVB light therapy.\par The patient tolerated the last treatment well, without significant erythema or a burn.\par The patient confirmed that there are no new medications.\par The patient wearing the appropriate UV protective eye glasses.\par The treatment was administered as follows:\par 0.8  Joules.  Maximum time  1  minutes and  15  seconds.\par \par Jacobsen #:  1\par \par The patient tolerated the treatment well and was instructed to notify the office if there is significant redness or a burn over the next 48 hours.\par \par \par

## 2020-06-08 ENCOUNTER — APPOINTMENT (OUTPATIENT)
Dept: DERMATOLOGY | Facility: CLINIC | Age: 25
End: 2020-06-08
Payer: COMMERCIAL

## 2020-06-08 PROCEDURE — 96910 PHOTCHMTX TAR&UVB/PTRLTM&UVB: CPT

## 2020-06-08 NOTE — DISCUSSION/SUMMARY
[FreeTextEntry1] : The patient presents for UVB therapy.\par The patient tolerated the last treatment well, without significant erythema or a burn.\par The patient confirmed that there are no new medications.\par The patient was wearing the appropriate UV protective eye glasses.\par The treatment was administered as follows:\par      1.0    Joules.     Maximum time     1  minutes and    34    seconds.\par \par   \par Jacobsen # 1\par \par The patient tolerated the treatment well and was instructed to notify the office if there is a significant\par redness or a burn over the next 48 hours.\par

## 2020-06-10 ENCOUNTER — APPOINTMENT (OUTPATIENT)
Dept: DERMATOLOGY | Facility: CLINIC | Age: 25
End: 2020-06-10
Payer: COMMERCIAL

## 2020-06-10 PROCEDURE — 96910 PHOTCHMTX TAR&UVB/PTRLTM&UVB: CPT

## 2020-06-10 NOTE — DISCUSSION/SUMMARY
[FreeTextEntry1] : The patient presents for UVB light therapy.\par The patient tolerated the last treatment well, without significant erythema or a burn.\par The patient confirmed that there are no new medications.\par The patient wearing the appropriate UV protective eye glasses.\par The treatment was administered as follows:\par 1.2  Joules.  Maximum time  1  minutes and  53  seconds.\par \par Jacobsen #:  1\par \par The patient tolerated the treatment well and was instructed to notify the office if there is significant redness or a burn over the next 48 hours.\par \par \par

## 2020-06-12 ENCOUNTER — APPOINTMENT (OUTPATIENT)
Dept: DERMATOLOGY | Facility: CLINIC | Age: 25
End: 2020-06-12

## 2020-06-12 NOTE — DISCUSSION/SUMMARY
[FreeTextEntry1] : The patient presents for UVB light therapy.\par The patient tolerated the last treatment well, without significant erythema or a burn.\par The patient confirmed that there are no new medications.\par The patient wearing the appropriate UV protective eye glasses.\par The treatment was administered as follows:\par 1.4  Joules.  Maximum time  2  minutes and  12  seconds.\par \par Jacobsen #:  1\par \par The patient tolerated the treatment well and was instructed to notify the office if there is significant redness or a burn over the next 48 hours.\par \par \par

## 2020-06-15 ENCOUNTER — APPOINTMENT (OUTPATIENT)
Dept: DERMATOLOGY | Facility: CLINIC | Age: 25
End: 2020-06-15
Payer: COMMERCIAL

## 2020-06-15 PROCEDURE — 96910 PHOTCHMTX TAR&UVB/PTRLTM&UVB: CPT

## 2020-06-17 ENCOUNTER — APPOINTMENT (OUTPATIENT)
Dept: DERMATOLOGY | Facility: CLINIC | Age: 25
End: 2020-06-17
Payer: COMMERCIAL

## 2020-06-17 PROCEDURE — 96910 PHOTCHMTX TAR&UVB/PTRLTM&UVB: CPT

## 2020-06-17 NOTE — DISCUSSION/SUMMARY
[FreeTextEntry1] : The patient presents for UVB light therapy.\par The patient tolerated the last treatment well, without significant erythema or a burn.\par The patient confirmed that there are no new medications. \par The patient was wearing the appropriate UV protective eye glasses.\par The treatment was administrated as follows:\par \par       1.6   Joules.   Maximum time      2     minutes and      31    seconds.\par \par Jacobsen# 1\par \par The patient tolerated the treatment well and was instructed to notify the office of there is significant redness or a burn over the next 48 hours.\par

## 2020-06-19 ENCOUNTER — APPOINTMENT (OUTPATIENT)
Dept: DERMATOLOGY | Facility: CLINIC | Age: 25
End: 2020-06-19
Payer: COMMERCIAL

## 2020-06-19 PROCEDURE — 96910 PHOTCHMTX TAR&UVB/PTRLTM&UVB: CPT

## 2020-06-19 NOTE — PATIENT PROFILE ADULT - BRADEN ACTIVITY
surgery. No history of loss of consciousness. She did not check her pacemaker for the past 1 and half years. I checked her Medtronic dual-chamber pacemaker today. Intermittent short episodes of atrial tachycardia versus far field sensing from ventricular beats. The longest recorded episode was 15 minutes back in February 2020. She has few short episodes today, the longest was 11 minutes. Her heart rate is well controlled during episodes of mode switch. No significant ventricular arrhythmia recorded. ECGs reviewed, AV dual-chamber paced rhythm with APCs and occasionally nonconducted P waves, probably falling into the atrial blanking. Past Medical History:   Diagnosis Date    CAD (coronary artery disease)     Cancer ([facility] [address] 1996    Breast Left    Depression     Hyperlipidemia     Hypertension     Pneumonia        CURRENT ALLERGIES: Penicillins REVIEW OF SYSTEMS: 14 systems were reviewed. Pertinent positives and negatives as above, all else negative.      Past Surgical History:   Procedure Laterality Date    APPENDECTOMY  06/19/2020    per Dr. Citlali Espinoza  2015    [facility]   Dr. Buck Rivera TONSILLECTOMY      Social History:  Social History     Tobacco Use    Smoking status: Never Smoker    Smokeless tobacco: Never Used   Substance Use Topics    Alcohol use: Yes     Comment: Social    Drug use: No        OUTPATIENT MEDICATIONS:  Outpatient Medications Marked as Taking for the 6/18/20 encounter [facility] HOSP[facility] Encounter)   Medication Sig Dispense Refill    atorvastatin (LIPITOR) 40 MG tablet Take 40 mg by mouth nightly      FLUoxetine (PROZAC) 20 MG capsule Take 20 mg by mouth 2 times daily AM and 12 noon      traZODone (DESYREL) 50 MG tablet Take 50 mg by mouth nightly      losartan (COZAAR) 50 MG tablet Take 50 mg by mouth daily         atorvastatin (LIPITOR) tablet 40 mg, Nightly  FLUoxetine (PROZAC) capsule 20 mg, BID  traZODone (DESYREL) tablet 50 mg, Nightly  losartan (COZAAR) tablet 50 mg, Daily  HYDROcodone-acetaminophen (NORCO) 5-325 MG per tablet 1 tablet, Q4H PRN  [START ON 6/20/2020] enoxaparin (LOVENOX) injection 40 mg, Daily  ciprofloxacin (CIPRO) IVPB 400 mg, Q12H  metronidazole (FLAGYL) 500 mg in NaCl 100 mL IVPB premix, Q8H  0.9 % sodium chloride infusion, Continuous  ondansetron (ZOFRAN) injection 4 mg, Q6H PRN  morphine (PF) injection 2 mg, Q3H PRN        FAMILY HISTORY: Please see HPI. PHYSICAL EXAM:   /65   Pulse 85   Temp 99.5 °F (37.5 °C) (Oral)   Resp 22   Ht 5' 8\" (1.727 m)   Wt 234 lb 4.8 oz (106.3 kg)   SpO2 93%   BMI 35.63 kg/m²  Body mass index is 35.63 kg/m². Constitutional: She is oriented to person, place, and time. She appears well-developed and well-nourished. In no acute distress. HEENT: Normocephalic and atraumatic. No JVD present. Carotid bruit is not present. No mass and no thyromegaly present. No lymphadenopathy present. Cardiovascular: Normal rate, regularly irregular rhythm, normal heart sounds. Exam reveals no gallop and no friction rubs. No murmur was heard. .  Pulmonary/Chest: Effort normal and breath sounds normal. No respiratory distress. She has no wheezes, rhonchi or rales. Abdominal: Soft, non-tender. Bowel sounds and aorta are normal. She exhibits no organomegaly, mass or bruit. Extremities: No significant edema. No cyanosis or clubbing. 2+ radial and carotid pulses. Distal extremity pulses: 2+ bilaterally. .  Neurological: She is alert and oriented to person, place, and time. No evidence of gross cranial nerve deficit. Coordination appeared normal.   Skin: Skin is warm and dry. There is no rash or diaphoresis. Psychiatric: She has a normal mood and affect.  Her speech is normal and behavior is normal.        MOST RECENT LABS ON RECORD:   Lab Results   Component Value Date    WBC 14.8 (H) 06/18/2020    HGB 14.8 06/18/2020    HCT 44.1 06/18/2020    PLT daily.   · Additional testing: Additional Testing List: I ordered a echocardiogram to better assess for the etiology of this problem and to help guide future management      · Hyperlipidemia: Mixed  · Cholesterol Reduction Therapy: Continue Atorvastatin (Lipitor) 40 mg daily. I discussed the potential benefits of statin therapy as well as the potential risks including myalgia as well as the rare but potentially serious complication of liver or kidney damage. Although rare, I told them that this could be serious and therefore told them to stop the medication immediately and call if they developed any severe muscle aches or pains and they agreed to do so. · I will get a repeat lipid panel on follow-up. I scheduled for her to be seen in my office 6/29/2020 at 12:00pm    Once again, thank you for allowing me to participate in this patients care. Please do not hesitate to contact me could I be of further assistance. Sincerely,  Qi Collins MD, F.A.C.C. [facility] Cardiology Specialist     [address]  [address] [place] [address], [address] [facility]  Phone: [phone], Fax: [phone]     I believe that the risk of significant morbidity and mortality related to the patient's current medical conditions are: Intermediate. The documentation recorded by the scribe, accurately and completely reflects the services I personally performed and the decisions made by me. Qi Collins MD, F.A.C.C.  June 19, 2020 (3) walks occasionally

## 2020-06-19 NOTE — DISCUSSION/SUMMARY
[FreeTextEntry1] : The patient presents for UVB light therapy.\par The patient tolerated the last treatment well, without significant erythema or a burn.\par The patient confirmed that there are no new medications.\par The patient was wearing the appropriate UV protective eye glasses.\par The treatment was administered as follows:\par  1.8    Joules.  Maximum time   2    minutes and     50     seconds.\par \par Jacobsen # 1\par \par The patient tolerated the treatment well and was instructed to notify the office if there is significant redness or a burn over the next 48 hours.\par

## 2020-06-22 ENCOUNTER — APPOINTMENT (OUTPATIENT)
Dept: DERMATOLOGY | Facility: CLINIC | Age: 25
End: 2020-06-22
Payer: COMMERCIAL

## 2020-06-22 PROCEDURE — 99213 OFFICE O/P EST LOW 20 MIN: CPT | Mod: 25

## 2020-06-22 PROCEDURE — 96910 PHOTCHMTX TAR&UVB/PTRLTM&UVB: CPT | Mod: 59

## 2020-06-22 RX ORDER — PREDNISONE 20 MG/1
20 TABLET ORAL
Qty: 9 | Refills: 3 | Status: ACTIVE | COMMUNITY
Start: 2020-06-22 | End: 1900-01-01

## 2020-06-22 RX ORDER — GABAPENTIN 100 MG/1
100 CAPSULE ORAL
Qty: 90 | Refills: 0 | Status: DISCONTINUED | COMMUNITY
Start: 2019-01-06 | End: 2020-06-22

## 2020-06-22 RX ORDER — CRISABOROLE 20 MG/G
2 OINTMENT TOPICAL
Qty: 60 | Refills: 0 | Status: DISCONTINUED | COMMUNITY
Start: 2019-02-28 | End: 2020-06-22

## 2020-06-22 NOTE — HISTORY OF PRESENT ILLNESS
[FreeTextEntry1] : phototherapy [de-identified] : 24 year old male here for referral for phototherapy. Patient follows with Dr. Rasmussen for extensive atopic dermatitis. Was unable to tolerate dupixent due to bad conjunctivitis. Had previously been on MTX but was unable to tolerate. He has asthma and high eo's and is followed closely by [facility] who has him on nucala for asthma. He recently flared and was restarted on topicals. Has been consistently on UVB for 3-4 weeks.

## 2020-06-22 NOTE — PHYSICAL EXAM
[FreeTextEntry3] : AAOx3, pleasant, NAD, no visual lymphadenopathy\par hair, scalp, face, nose, eyelids, ears, lips, oropharynx, neck, chest, abdomen, back, right arm, left arm, nails, and hands examined with all normal findings,\par pertinent findings include:\par \par diffuse erythematous and xerotic plaques with evidence of excoriations; worst on face, b/l arms, and b/l legs (~40% BSA)

## 2020-06-22 NOTE — DISCUSSION/SUMMARY
[FreeTextEntry1] : The patient presents for UVB light therapy.\par The patient tolerated the last treatment well, without significant erythema or a burn.\par The patient confirmed that there are no new medications.\par The patient was wearing the appropriate UV protective eye glasses. \par 2.0  Joules.  Maximum Time  3    Minutes and  09    Seconds.\par \par Jacobsen # 1\par \par \par The patient tolerated treatment well and was instructed to notify the office if there is significant \par redness or a burn over the next 48 hours

## 2020-06-22 NOTE — HISTORY OF PRESENT ILLNESS
[FreeTextEntry1] : phototherapy [de-identified] : 24 year old male here for referral for phototherapy. Patient follows with Dr. Rasmussen for extensive atopic dermatitis. Was unable to tolerate dupixent due to bad conjunctivitis. Had previously been on MTX but was unable to tolerate. He has asthma and high eo's and is followed closely by [facility] who has him on nucala for asthma. He recently flared and was restarted on topicals. Has been consistently on UVB for 3-4 weeks.

## 2020-06-24 NOTE — DISCUSSION/SUMMARY
[FreeTextEntry1] :  The patient presents for UVB light therapy.\par The patient tolerated the last treatment well, without significant erythema or a burn.\par The patient confirmed that there are no new medications.\par The patient wearing the appropriate UV protective eye glasses.\par The treatment was administered as follows:\par      2.0   Joules. Maximum time  3    minutes and  09    seconds.\par \par \par Jacobsen# 1\par \par The patient tolerated the treatment well and was instructed to notify the office if there is significant redness or a burn over the next 48 hours.\par

## 2020-06-26 ENCOUNTER — APPOINTMENT (OUTPATIENT)
Dept: DERMATOLOGY | Facility: CLINIC | Age: 25
End: 2020-06-26
Payer: COMMERCIAL

## 2020-06-26 PROCEDURE — 96910 PHOTCHMTX TAR&UVB/PTRLTM&UVB: CPT

## 2020-06-29 ENCOUNTER — APPOINTMENT (OUTPATIENT)
Dept: DERMATOLOGY | Facility: CLINIC | Age: 25
End: 2020-06-29

## 2020-06-29 NOTE — DISCUSSION/SUMMARY
[FreeTextEntry1] : The patient presents for UVB light therapy.\par The patient tolerated the last treatment well, without significant erythema or a burn.\par The patient confirmed that there are no new medications. \par The patient was wearing the appropriate UV protective eye glasses.\par The treatment was administrated as follows:\par \par     2.0     Joules.   Maximum time        3   minutes and           09   seconds.\par \par Jacobsen#1\par \par The patient tolerated the treatment well and was instructed to notify the office of there is significant redness or a burn over the next 48 hours.\par

## 2020-07-01 ENCOUNTER — APPOINTMENT (OUTPATIENT)
Dept: DERMATOLOGY | Facility: CLINIC | Age: 25
End: 2020-07-01
Payer: COMMERCIAL

## 2020-07-01 PROCEDURE — 96910 PHOTCHMTX TAR&UVB/PTRLTM&UVB: CPT

## 2020-07-07 NOTE — DISCUSSION/SUMMARY
[FreeTextEntry1] : The patient presents for UVB light therapy.\par The patient tolerated the last treatment well, without significant erythema or a burn.\par The patient confirmed that there are no new medications. \par The patient was wearing the appropriate UV protective eye glasses.\par The treatment was administrated as follows:\par \par       2.0   Joules.   Maximum time        3   minutes and           06   seconds.\par \par Jacobsen# 1\par \par The patient tolerated the treatment well and was instructed to notify the office of there is significant redness or a burn over the next 48 hours.\par

## 2020-07-22 ENCOUNTER — APPOINTMENT (OUTPATIENT)
Dept: DERMATOLOGY | Facility: CLINIC | Age: 25
End: 2020-07-22

## 2020-07-24 ENCOUNTER — APPOINTMENT (OUTPATIENT)
Dept: DERMATOLOGY | Facility: CLINIC | Age: 25
End: 2020-07-24

## 2020-07-31 ENCOUNTER — APPOINTMENT (OUTPATIENT)
Dept: DERMATOLOGY | Facility: CLINIC | Age: 25
End: 2020-07-31
Payer: COMMERCIAL

## 2020-07-31 PROCEDURE — 96910 PHOTCHMTX TAR&UVB/PTRLTM&UVB: CPT

## 2020-08-03 ENCOUNTER — APPOINTMENT (OUTPATIENT)
Dept: DERMATOLOGY | Facility: CLINIC | Age: 25
End: 2020-08-03
Payer: COMMERCIAL

## 2020-08-03 PROCEDURE — 96910 PHOTCHMTX TAR&UVB/PTRLTM&UVB: CPT

## 2020-08-03 NOTE — DISCUSSION/SUMMARY
[FreeTextEntry1] : The patient presents for UVB light therapy.\par The patient tolerated the last treatment well, without significant erythema or a burn.\par The patient confirmed that there are no new medications.\par The patient was wearing the appropriate UV protective eye glasses. \par 1.2 Joules.  Maximum Time  1    Minutes and   51   Seconds.\par As per Dr. Dawson\par \par Jacobsen #\par \par \par The patient tolerated treatment well and was instructed to notify the office if there is significant \par redness or a burn over the next 48 hours

## 2020-08-05 ENCOUNTER — APPOINTMENT (OUTPATIENT)
Dept: DERMATOLOGY | Facility: CLINIC | Age: 25
End: 2020-08-05

## 2020-08-05 NOTE — DISCUSSION/SUMMARY
[FreeTextEntry1] : The patient presents for UVB light therapy.\par The patient tolerated the last treatment well, without significant erythema or a burn.\par The patient confirmed that there are no new medications.\par The patient was wearing the appropriate UV protective eye glasses.\par The treatment was administered as follows:\par  1.3    Joules.  Maximum time    2   minutes and    00      seconds.\par \par Jacobsen # 1\par \par The patient tolerated the treatment well and was instructed to notify the office if there is significant redness or a burn over the next 48 hours.\par

## 2020-08-11 ENCOUNTER — APPOINTMENT (OUTPATIENT)
Dept: DERMATOLOGY | Facility: CLINIC | Age: 25
End: 2020-08-11
Payer: COMMERCIAL

## 2020-08-11 PROCEDURE — 96910 PHOTCHMTX TAR&UVB/PTRLTM&UVB: CPT

## 2020-08-13 ENCOUNTER — APPOINTMENT (OUTPATIENT)
Dept: DERMATOLOGY | Facility: CLINIC | Age: 25
End: 2020-08-13
Payer: COMMERCIAL

## 2020-08-13 PROCEDURE — 96910 PHOTCHMTX TAR&UVB/PTRLTM&UVB: CPT

## 2020-08-13 NOTE — DISCUSSION/SUMMARY
[FreeTextEntry1] : The patient presents for UVB light therapy.\par The patient tolerated the last treatment well, without significant erythema or a burn.\par The patient confirmed that there are no new medications. \par The patient was wearing the appropriate UV protective eye glasses.\par The treatment was administrated as follows:\par \par     1.4     Joules.   Maximum time       2    minutes and        10      seconds.\par \par Jacobsen# 1\par \par The patient tolerated the treatment well and was instructed to notify the office of there is significant redness or a burn over the next 48 hours.\par

## 2020-08-16 ENCOUNTER — TRANSCRIPTION ENCOUNTER (OUTPATIENT)
Age: 25
End: 2020-08-16

## 2020-08-16 NOTE — DISCUSSION/SUMMARY
[FreeTextEntry1] :  The patient presents for UVB light therapy.\par The patient tolerated the last treatment well, without significant erythema or a burn.\par The patient confirmed that there are no new medications.\par The patient wearing the appropriate UV protective eye glasses.\par The treatment was administered as follows:\par     1.6    Joules. Maximum time   2   minutes and  28    seconds.\par \par \par Jacobsen# 1\par \par The patient tolerated the treatment well and was instructed to notify the office if there is significant redness or a burn over the next 48 hours.\par

## 2020-08-17 ENCOUNTER — EMERGENCY (EMERGENCY)
Facility: HOSPITAL | Age: 25
LOS: 0 days | Discharge: HOME | End: 2020-08-17
Payer: COMMERCIAL

## 2020-08-17 VITALS
OXYGEN SATURATION: 96 % | HEART RATE: 77 BPM | TEMPERATURE: 98 F | RESPIRATION RATE: 16 BRPM | DIASTOLIC BLOOD PRESSURE: 63 MMHG | SYSTOLIC BLOOD PRESSURE: 134 MMHG

## 2020-08-17 DIAGNOSIS — Z20.828 CONTACT WITH AND (SUSPECTED) EXPOSURE TO OTHER VIRAL COMMUNICABLE DISEASES: ICD-10-CM

## 2020-08-17 DIAGNOSIS — G47.33 OBSTRUCTIVE SLEEP APNEA (ADULT) (PEDIATRIC): Chronic | ICD-10-CM

## 2020-08-17 DIAGNOSIS — L20.89 OTHER ATOPIC DERMATITIS: ICD-10-CM

## 2020-08-17 DIAGNOSIS — K22.5 DIVERTICULUM OF ESOPHAGUS, ACQUIRED: ICD-10-CM

## 2020-08-17 DIAGNOSIS — Z91.09 OTHER ALLERGY STATUS, OTHER THAN TO DRUGS AND BIOLOGICAL SUBSTANCES: ICD-10-CM

## 2020-08-17 DIAGNOSIS — Z98.890 OTHER SPECIFIED POSTPROCEDURAL STATES: Chronic | ICD-10-CM

## 2020-08-17 DIAGNOSIS — J45.909 UNSPECIFIED ASTHMA, UNCOMPLICATED: ICD-10-CM

## 2020-08-17 DIAGNOSIS — J34.2 DEVIATED NASAL SEPTUM: ICD-10-CM

## 2020-08-17 DIAGNOSIS — K21.9 GASTRO-ESOPHAGEAL REFLUX DISEASE WITHOUT ESOPHAGITIS: ICD-10-CM

## 2020-08-17 DIAGNOSIS — Q24.8 OTHER SPECIFIED CONGENITAL MALFORMATIONS OF HEART: ICD-10-CM

## 2020-08-17 PROCEDURE — 99283 EMERGENCY DEPT VISIT LOW MDM: CPT

## 2020-08-17 PROCEDURE — U0003: CPT

## 2020-08-17 NOTE — ED STATDOCS - OBJECTIVE STATEMENT
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

## 2020-08-17 NOTE — ED STATDOCS - PATIENT PORTAL LINK FT
You can access the FollowMyHealth Patient Portal offered by [facility] by registering at the following website: [email or web address]. By joining [employer]’s FollowMyHealth portal, you will also be able to view your health information using other applications (apps) compatible with our system.

## 2020-08-17 NOTE — ED STATDOCS - CLINICAL SUMMARY MEDICAL DECISION MAKING FREE TEXT BOX
patient presents with HA and concern for COVID exposure.  As patient is nontoxic appearing will test for COVID and d/c.  Quarantine reviewed and return precautions reviewed.

## 2020-08-19 LAB — SARS-COV-2 RNA SPEC QL NAA+PROBE: SIGNIFICANT CHANGE UP

## 2020-08-31 ENCOUNTER — APPOINTMENT (OUTPATIENT)
Dept: DERMATOLOGY | Facility: CLINIC | Age: 25
End: 2020-08-31

## 2020-10-09 ENCOUNTER — APPOINTMENT (OUTPATIENT)
Dept: DERMATOLOGY | Facility: CLINIC | Age: 25
End: 2020-10-09

## 2020-10-12 ENCOUNTER — APPOINTMENT (OUTPATIENT)
Dept: DERMATOLOGY | Facility: CLINIC | Age: 25
End: 2020-10-12
Payer: COMMERCIAL

## 2020-10-12 PROCEDURE — 96910 PHOTCHMTX TAR&UVB/PTRLTM&UVB: CPT

## 2020-10-14 ENCOUNTER — APPOINTMENT (OUTPATIENT)
Dept: DERMATOLOGY | Facility: CLINIC | Age: 25
End: 2020-10-14
Payer: COMMERCIAL

## 2020-10-14 PROCEDURE — 96910 PHOTCHMTX TAR&UVB/PTRLTM&UVB: CPT

## 2020-10-14 NOTE — DISCUSSION/SUMMARY
[FreeTextEntry1] : The patient presents for UVB light therapy.\par The patient tolerated the last treatment well, without significant erythema or a burn.\par The patient confirmed that there are no new medications.\par The patient wearing the appropriate UV protective eye glasses.\par The treatment was administered as follows:\par 1.0  Joules.  Maximum time  1  minutes and  33  seconds.\par As per Dr. Olson\par \par Jacobsen #:  1\par \par The patient tolerated the treatment well and was instructed to notify the office if there is significant redness or a burn over the next 48 hours.\par \par \par

## 2020-10-16 ENCOUNTER — APPOINTMENT (OUTPATIENT)
Dept: DERMATOLOGY | Facility: CLINIC | Age: 25
End: 2020-10-16

## 2020-10-16 NOTE — DISCUSSION/SUMMARY
[FreeTextEntry1] : The patient presents for UVB light therapy.\par The patient tolerated the last treatment well, without significant erythema or a burn.\par The patient confirmed that there are no new medications. \par The patient was wearing the appropriate UV protective eye glasses.\par The treatment was administrated as follows:\par \par    1.2      Joules.   Maximum time      1     minutes and       51       seconds.\par \par Jacobsen# 1\par \par The patient tolerated the treatment well and was instructed to notify the office of there is significant redness or a burn over the next 48 hours.\par

## 2020-10-19 ENCOUNTER — APPOINTMENT (OUTPATIENT)
Dept: DERMATOLOGY | Facility: CLINIC | Age: 25
End: 2020-10-19

## 2020-10-19 ENCOUNTER — APPOINTMENT (OUTPATIENT)
Dept: DERMATOLOGY | Facility: CLINIC | Age: 25
End: 2020-10-19
Payer: COMMERCIAL

## 2020-10-19 DIAGNOSIS — L20.9 ATOPIC DERMATITIS, UNSPECIFIED: ICD-10-CM

## 2020-10-19 DIAGNOSIS — R21 RASH AND OTHER NONSPECIFIC SKIN ERUPTION: ICD-10-CM

## 2020-10-19 PROCEDURE — 96910 PHOTCHMTX TAR&UVB/PTRLTM&UVB: CPT

## 2020-10-19 PROCEDURE — 99213 OFFICE O/P EST LOW 20 MIN: CPT | Mod: 25

## 2020-10-21 ENCOUNTER — APPOINTMENT (OUTPATIENT)
Dept: DERMATOLOGY | Facility: CLINIC | Age: 25
End: 2020-10-21
Payer: COMMERCIAL

## 2020-10-21 PROCEDURE — 96910 PHOTCHMTX TAR&UVB/PTRLTM&UVB: CPT

## 2020-10-21 NOTE — DISCUSSION/SUMMARY
[FreeTextEntry1] : The patient presents for UVB light therapy.\par The patient tolerated the last treatment well, without significant erythema or a burn.\par The patient confirmed that there are no new medications.\par The patient wearing the appropriate UV protective eye glasses.\par The treatment was administered as follows:\par     \par 1.4  Joules.  Maximum time  2  minutes and  10  seconds.\par \par Jacobsen #:  1\par \par The patient tolerated the treatment well and was instructed to notify the office if there is significant redness or a burn over the next 48 hours.\par \par \par

## 2020-10-23 NOTE — DISCUSSION/SUMMARY
[FreeTextEntry1] : The patient presents for UVB light therapy.\par The patient tolerated the last treatment well, without significant erythema or a burn.\par The patient confirmed that there are no new medications.\par The patient wearing the appropriate UV protective eye glasses.\par The treatment was administered as follows:\par \par 1.6  Joules.  Maximum time  2  minutes and  28  seconds.\par \par Jacobsen #:  1\par \par The patient tolerated the treatment well and was instructed to notify the office if there is significant redness or a burn over the next 48 hours.\par \par \par

## 2020-10-27 ENCOUNTER — APPOINTMENT (OUTPATIENT)
Dept: DERMATOLOGY | Facility: CLINIC | Age: 25
End: 2020-10-27
Payer: COMMERCIAL

## 2020-10-27 PROCEDURE — 99072 ADDL SUPL MATRL&STAF TM PHE: CPT

## 2020-10-27 PROCEDURE — 96910 PHOTCHMTX TAR&UVB/PTRLTM&UVB: CPT

## 2020-10-27 NOTE — PROGRESS NOTE ADULT - ASSESSMENT
Patient:  Juancarlos Matson   :   1955      Relevant clinical history, particularly as it involves the pending procedure, was reviewed and discussed. The procedure including risks and benefits was discussed at length with the patient (or designated family member) and all questions were answered. Informed consent to proceed with the procedure was given. Vital signs were monitored and documented by the Radiology nurse. Targeted physical examination  Heart : regular rate and rhythm  Lungs : clear, breathing easily  Condition : stable    [facility] nurses notes reviewed and agreed. Past Medical History:        Diagnosis Date    Cancer ([place] [address]     Hyperlipidemia     Hypertension     PONV (postoperative nausea and vomiting)        Past Surgical History:           Procedure Laterality Date    APPENDECTOMY      FRACTURE SURGERY      foot    SALPINGO-OOPHORECTOMY Bilateral 10/5/2020    ROBOTIC LAPAROSCOPIC BILATERAL SALPINGO OOPHORECTOMY performed by Carmen Traore MD at [address]         Allergies:  Trazodone    Medications:   Home Meds  Current Outpatient Medications on File Prior to Encounter   Medication Sig Dispense Refill    amLODIPine (NORVASC) 5 MG tablet Take 5 mg by mouth      lisinopril-hydrochlorothiazide (PRINZIDE;ZESTORETIC) 10-12.5 MG per tablet Take 1 tablet by mouth      Multiple Vitamin (MULTIVITAMIN) tablet Take 1 tablet by mouth      Calcium Carbonate-Vitamin D (CALCIUM-VITAMIN D) 500-200 MG-UNIT per tablet Take 1 tablet by mouth      ibuprofen (ADVIL;MOTRIN) 600 MG tablet Take 1 tablet by mouth every 6 hours 30 tablet 0    acetaminophen (TYLENOL) 500 MG tablet Take 1 tablet by mouth every 6 hours as needed for Pain 30 tablet 0    Pseudoephedrine-Guaifenesin (MUCINEX D MAX STRENGTH PO) Take by mouth       No current facility-administered medications on file prior to encounter.         Current Meds  No current facility-administered - asthma exacerbation with the less wheezing and patient has vague chest pains with the coughing likely musculoskeletal pains   - allergic rhinitis   - known patient with the sleep apnea treated with maxially and mandibular advancement 2 years ago     PLAN     - rocephin can be discontinued and RVP panel is negative . hold off the betablocker now given acute symptoms   - continue with the steroids and nebs and symbicort during the hospital stay and advair as an out patient   - as the patient continue to improve anticipate discharge in the A.[facility] with the follow up with his pulmonary once discharged . patient is getting to his baseline peak flow   - analgesics for the pains and out patient psychiatry follow up for the anxiety

## 2020-10-29 ENCOUNTER — APPOINTMENT (OUTPATIENT)
Dept: DERMATOLOGY | Facility: CLINIC | Age: 25
End: 2020-10-29
Payer: COMMERCIAL

## 2020-10-29 PROCEDURE — 99072 ADDL SUPL MATRL&STAF TM PHE: CPT

## 2020-10-29 PROCEDURE — 96910 PHOTCHMTX TAR&UVB/PTRLTM&UVB: CPT

## 2020-10-29 NOTE — DISCUSSION/SUMMARY
[FreeTextEntry1] : The patient presents for UVB light therapy.\par The patient tolerated the last treatment well, without significant erythema or a burn.\par The patient confirmed that there are no new medications.\par The patient wearing the appropriate UV protective eye glasses.\par The treatment was administered as follows:\par 1.7  Joules.  Maximum time  2  minutes and  38  seconds.\par \par Jacobsen #:  1\par \par The patient tolerated the treatment well and was instructed to notify the office if there is significant redness or a burn over the next 48 hours.\par \par \par

## 2020-11-02 NOTE — DISCUSSION/SUMMARY
[FreeTextEntry1] : The patient presents for UVB light therapy.\par The patient tolerated the last treatment well, without significant erythema or a burn.\par The patient confirmed that there are no new medications. \par The patient was wearing the appropriate UV protective eye glasses.\par The treatment was administrated as follows:\par \par      1.8    Joules.   Maximum time      2     minutes and         47     seconds.\par \par Jacobsen# 1\par \par The patient tolerated the treatment well and was instructed to notify the office of there is significant redness or a burn over the next 48 hours.\par

## 2020-11-04 ENCOUNTER — APPOINTMENT (OUTPATIENT)
Dept: DERMATOLOGY | Facility: CLINIC | Age: 25
End: 2020-11-04
Payer: COMMERCIAL

## 2020-11-04 PROCEDURE — 99072 ADDL SUPL MATRL&STAF TM PHE: CPT

## 2020-11-04 PROCEDURE — 96910 PHOTCHMTX TAR&UVB/PTRLTM&UVB: CPT

## 2020-11-06 NOTE — DISCUSSION/SUMMARY
[FreeTextEntry1] : The patient presents for UVB light therapy.\par The patient tolerated the last treatment well, without significant erythema or a burn.\par The patient confirmed that there are no new medications.\par The patient wearing the appropriate UV protective eye glasses.\par The treatment was administered as follows:\par     \par 1.9  Joules.  Maximum time  2  minutes and  56  seconds.\par \par Jacobsen #:  1\par \par The patient tolerated the treatment well and was instructed to notify the office if there is significant redness or a burn over the next 48 hours.\par \par \par

## 2020-11-11 ENCOUNTER — APPOINTMENT (OUTPATIENT)
Dept: DERMATOLOGY | Facility: CLINIC | Age: 25
End: 2020-11-11
Payer: COMMERCIAL

## 2020-11-11 PROCEDURE — 96910 PHOTCHMTX TAR&UVB/PTRLTM&UVB: CPT

## 2020-11-11 PROCEDURE — 99072 ADDL SUPL MATRL&STAF TM PHE: CPT

## 2020-11-13 ENCOUNTER — APPOINTMENT (OUTPATIENT)
Dept: DERMATOLOGY | Facility: CLINIC | Age: 25
End: 2020-11-13
Payer: COMMERCIAL

## 2020-11-13 PROCEDURE — 96910 PHOTCHMTX TAR&UVB/PTRLTM&UVB: CPT

## 2020-11-13 PROCEDURE — 99072 ADDL SUPL MATRL&STAF TM PHE: CPT

## 2020-11-13 NOTE — DISCUSSION/SUMMARY
[FreeTextEntry1] : The patient presents for UVB light therapy.\par The patient tolerated the last treatment well, without significant erythema or a burn.\par The patient confirmed that there are no new medications.\par The patient wearing the appropriate UV protective eye glasses.\par The treatment was administered as follows:\par 2.0  Joules.  Maximum time  3  minutes and  06  seconds.\par \par Jacobsen #:  1\par \par The patient tolerated the treatment well and was instructed to notify the office if there is significant redness or a burn over the next 48 hours.\par \par \par

## 2020-11-30 ENCOUNTER — APPOINTMENT (OUTPATIENT)
Dept: DERMATOLOGY | Facility: CLINIC | Age: 25
End: 2020-11-30

## 2020-12-08 ENCOUNTER — APPOINTMENT (OUTPATIENT)
Dept: DERMATOLOGY | Facility: CLINIC | Age: 25
End: 2020-12-08
Payer: COMMERCIAL

## 2020-12-08 PROCEDURE — 96910 PHOTCHMTX TAR&UVB/PTRLTM&UVB: CPT

## 2020-12-08 PROCEDURE — 99072 ADDL SUPL MATRL&STAF TM PHE: CPT

## 2020-12-10 NOTE — DISCUSSION/SUMMARY
[FreeTextEntry1] : The patient presents for UVB light therapy.\par The patient tolerated the last treatment well, without significant erythema or a burn.\par The patient confirmed that there are no new medications. \par The patient was wearing the appropriate UV protective eye glasses.\par The treatment was administrated as follows:\par \par     1.4     Joules.   Maximum time     2      minutes and         10    seconds.\par as per Dr. Dawson. \par Jacobsen# 1\par \par The patient tolerated the treatment well and was instructed to notify the office of there is significant redness or a burn over the next 48 hours.\par

## 2020-12-11 ENCOUNTER — APPOINTMENT (OUTPATIENT)
Dept: DERMATOLOGY | Facility: CLINIC | Age: 25
End: 2020-12-11
Payer: COMMERCIAL

## 2020-12-11 PROCEDURE — 96910 PHOTCHMTX TAR&UVB/PTRLTM&UVB: CPT

## 2020-12-11 PROCEDURE — 99072 ADDL SUPL MATRL&STAF TM PHE: CPT

## 2020-12-14 NOTE — DISCUSSION/SUMMARY
[FreeTextEntry1] : The patient presents for UVB therapy.\par The patient tolerated the last treatment well, without significant erythema or a burn.\par The patient confirmed that there are no new medications.\par The patient was wearing the appropriate UV protective eye glasses.\par The treatment was administered as follows:\par       1.6    Joules.     Maximum time    2   minutes and     28   seconds.\par \par   \par Jacobsen # 1\par \par The patient tolerated the treatment well and was instructed to notify the office if there is a significant\par redness or a burn over the next 48 hours.\par

## 2020-12-21 ENCOUNTER — TRANSCRIPTION ENCOUNTER (OUTPATIENT)
Age: 25
End: 2020-12-21

## 2020-12-24 NOTE — DISCHARGE NOTE ADULT - NURSING SECTION COMPLETE
END OF SHIFT NOTE:    INTAKE/OUTPUT  12/23 0701 - 12/24 0700  In: 480 [P.O.:480]  Out: 600 [Urine:600]  Voiding: YES  Catheter: NO  Drain:   External Female Catheter 12/24/20 (Active)   Site Assessment Clean, dry, & intact 12/24/20 0634   Repositioned Yes 12/24/20 0634   Perineal Care Yes 12/24/20 0634   Wick Changed Yes 12/24/20 0634   Suction Canister/Tubing Changed No 12/24/20 0634   Urine Output (mL) 100 ml 12/24/20 0630     Flatus: Patient does not have flatus present. Stool:  0 occurrences. Characteristics:    Emesis: 0 occurrences. Characteristics:     VITAL SIGNS  Patient Vitals for the past 12 hrs:   Temp Pulse Resp BP SpO2   12/24/20 0531 -- (!) 48 -- (!) 145/57 97 %   12/24/20 0348 98 °F (36.7 °C) (!) 47 22 (!) 169/69 97 %   12/23/20 2347 98.5 °F (36.9 °C) (!) 59 22 (!) 155/58 98 %   12/23/20 2306 97.9 °F (36.6 °C) (!) 59 22 (!) 184/79 97 %   12/23/20 2126 -- -- -- -- 98 %   12/23/20 1933 97.9 °F (36.6 °C) (!) 55 19 (!) 164/71 98 %       Pain Assessment  Pain Intensity 1: 0 (12/23/20 2045)  Pain Location 1: Abdomen, Leg(BLE & mid upper abd)  Pain Intervention(s) 1: Repositioned(contacting MD)  Patient Stated Pain Goal: 0    Ambulating  No    Shift report given to oncoming nurse at the bedside.     [facility]giver provided printed discharge information.

## 2021-03-08 NOTE — H&P PST ADULT - PSYCHIATRIC
----- Message from Katherin Roa MD sent at 3/8/2021  4:09 PM EST -----  Please let the patient know that his PSA is normal and the CT demonstrates no stone presence  Will recommend referral to physical therapy  details… detailed exam

## 2021-04-20 ENCOUNTER — APPOINTMENT (OUTPATIENT)
Dept: DERMATOLOGY | Facility: CLINIC | Age: 26
End: 2021-04-20
Payer: COMMERCIAL

## 2021-04-20 PROCEDURE — 99072 ADDL SUPL MATRL&STAF TM PHE: CPT

## 2021-04-20 PROCEDURE — 96910 PHOTCHMTX TAR&UVB/PTRLTM&UVB: CPT

## 2021-04-22 ENCOUNTER — APPOINTMENT (OUTPATIENT)
Dept: DERMATOLOGY | Facility: CLINIC | Age: 26
End: 2021-04-22
Payer: COMMERCIAL

## 2021-04-22 PROCEDURE — 99072 ADDL SUPL MATRL&STAF TM PHE: CPT

## 2021-04-22 PROCEDURE — 96910 PHOTCHMTX TAR&UVB/PTRLTM&UVB: CPT

## 2021-04-22 NOTE — DISCUSSION/SUMMARY
[FreeTextEntry1] : The patient presents for UVB light therapy.\par The patient tolerated the last treatment well, without significant erythema or a burn.\par The patient confirmed that there are no new medications.\par The patient wearing the appropriate UV protective eye glasses.\par The treatment was administered as follows:\par  0.2       Joules.  Maximum time   0    minutes and    18      seconds , as  per Dr. Dawson.\par \par Jacobsen #:  1\par \par The patient tolerated the treatment well and was instructed to notify the office if there is significant redness or a burn over the next 48 hours.\par

## 2021-04-24 ENCOUNTER — TRANSCRIPTION ENCOUNTER (OUTPATIENT)
Age: 26
End: 2021-04-24

## 2021-04-25 NOTE — DISCUSSION/SUMMARY
[FreeTextEntry1] : The patient presents for UVB light therapy.\par The patient tolerated the last treatment well, without significant erythema or a burn.\par The patient confirmed that there are no new medications. \par The patient was wearing the appropriate UV protective eye glasses.\par The treatment was administrated as follows:\par \par     0.4     Joules.   Maximum time       0    minutes and      37       seconds.\par \par Jacobsen# 1\par \par The patient tolerated the treatment well and was instructed to notify the office of there is significant redness or a burn over the next 48 hours.\par

## 2021-04-26 ENCOUNTER — APPOINTMENT (OUTPATIENT)
Dept: DERMATOLOGY | Facility: CLINIC | Age: 26
End: 2021-04-26
Payer: COMMERCIAL

## 2021-04-26 PROCEDURE — 99072 ADDL SUPL MATRL&STAF TM PHE: CPT

## 2021-04-26 PROCEDURE — 96910 PHOTCHMTX TAR&UVB/PTRLTM&UVB: CPT

## 2021-04-28 ENCOUNTER — APPOINTMENT (OUTPATIENT)
Dept: DERMATOLOGY | Facility: CLINIC | Age: 26
End: 2021-04-28

## 2021-04-28 NOTE — DISCUSSION/SUMMARY
[FreeTextEntry1] : The patient presents for UVB light therapy.\par The patient tolerated the last treatment well, without significant erythema or a burn.\par The patient confirmed that there are no new medications. \par The patient was wearing the appropriate UV protective eye glasses.\par The treatment was administrated as follows:\par \par  0.6 Joules. Maximum time 0 minutes and 55 seconds.\par \par Jacobsen# 1\par \par The patient tolerated the treatment well and was instructed to notify the office of there is significant redness or a burn over the next 48 hours.

## 2021-04-30 ENCOUNTER — APPOINTMENT (OUTPATIENT)
Dept: DERMATOLOGY | Facility: CLINIC | Age: 26
End: 2021-04-30
Payer: COMMERCIAL

## 2021-04-30 PROCEDURE — 99072 ADDL SUPL MATRL&STAF TM PHE: CPT

## 2021-04-30 PROCEDURE — 96910 PHOTCHMTX TAR&UVB/PTRLTM&UVB: CPT

## 2021-05-02 NOTE — DISCUSSION/SUMMARY
[FreeTextEntry1] : The patient presents for UVB light therapy.\par The patient tolerated the last treatment well, without significant erythema or a burn.\par The patient confirmed that there are no new medications. \par The patient was wearing the appropriate UV protective eye glasses.\par The treatment was administrated as follows:\par \par       0.8   Joules.   Maximum time    1       minutes and        14      seconds.\par \par Jacobsen# 1\par \par The patient tolerated the treatment well and was instructed to notify the office of there is significant redness or a burn over the next 48 hours.\par

## 2021-05-03 ENCOUNTER — APPOINTMENT (OUTPATIENT)
Dept: DERMATOLOGY | Facility: CLINIC | Age: 26
End: 2021-05-03
Payer: COMMERCIAL

## 2021-05-03 PROCEDURE — 99072 ADDL SUPL MATRL&STAF TM PHE: CPT

## 2021-05-03 PROCEDURE — 96910 PHOTCHMTX TAR&UVB/PTRLTM&UVB: CPT

## 2021-05-05 ENCOUNTER — APPOINTMENT (OUTPATIENT)
Dept: DERMATOLOGY | Facility: CLINIC | Age: 26
End: 2021-05-05
Payer: COMMERCIAL

## 2021-05-05 PROCEDURE — 96910 PHOTCHMTX TAR&UVB/PTRLTM&UVB: CPT

## 2021-05-05 PROCEDURE — 99072 ADDL SUPL MATRL&STAF TM PHE: CPT

## 2021-05-05 NOTE — DISCUSSION/SUMMARY
[FreeTextEntry1] :  The patient presents for UVB light therapy.\par The patient tolerated the last treatment well, without significant erythema or a burn.\par The patient confirmed that there are no new medications.\par The patient wearing the appropriate UV protective eye glasses.\par The treatment was administered as follows:\par    1.0     Joules. Maximum time    1  minutes and   32   seconds.\par \par \par Jacobsen#  1\par \par The patient tolerated the treatment well and was instructed to notify the office if there is significant redness or a burn over the next 48 hours.\par

## 2021-05-07 ENCOUNTER — APPOINTMENT (OUTPATIENT)
Dept: DERMATOLOGY | Facility: CLINIC | Age: 26
End: 2021-05-07

## 2021-05-12 ENCOUNTER — TRANSCRIPTION ENCOUNTER (OUTPATIENT)
Age: 26
End: 2021-05-12

## 2021-05-13 ENCOUNTER — APPOINTMENT (OUTPATIENT)
Dept: DERMATOLOGY | Facility: CLINIC | Age: 26
End: 2021-05-13
Payer: COMMERCIAL

## 2021-05-13 PROCEDURE — 96910 PHOTCHMTX TAR&UVB/PTRLTM&UVB: CPT

## 2021-05-13 PROCEDURE — 99072 ADDL SUPL MATRL&STAF TM PHE: CPT

## 2021-05-16 NOTE — DISCUSSION/SUMMARY
[FreeTextEntry1] : The patient presents for UVB light therapy.\par The patient tolerated the last treatment well, without significant erythema or a burn.\par The patient confirmed that there are no new medications. \par The patient was wearing the appropriate UV protective eye glasses.\par The treatment was administrated as follows:\par \par     1.4     Joules.   Maximum time       2    minutes and       10       seconds.\par Jacobsen# 1\par \par The patient tolerated the treatment well and was instructed to notify the office of there is significant redness or a burn over the next 48 hours.\par

## 2021-05-17 ENCOUNTER — APPOINTMENT (OUTPATIENT)
Dept: DERMATOLOGY | Facility: CLINIC | Age: 26
End: 2021-05-17

## 2021-05-19 ENCOUNTER — APPOINTMENT (OUTPATIENT)
Dept: DERMATOLOGY | Facility: CLINIC | Age: 26
End: 2021-05-19

## 2021-05-21 ENCOUNTER — APPOINTMENT (OUTPATIENT)
Dept: DERMATOLOGY | Facility: CLINIC | Age: 26
End: 2021-05-21

## 2021-06-07 NOTE — PROVIDER CONTACT NOTE (CRITICAL VALUE NOTIFICATION) - NAME OF MD/NP/PA/DO NOTIFIED:
Visual inspection:  Deformity/amputation: absent  Skin lesions/pre-ulcerative calluses: absent  Edema: right- negative, left- negative    Sensory exam:  Monofilament sensation: normal  (minimum of 5 random plantar locations tested, avoiding callused areas - > 1 area with absence of sensation is + for neuropathy)    Pulses: normal, md

## 2021-08-03 NOTE — ED PROVIDER NOTE - PROGRESS NOTE DETAILS
Pt called in again to get refill and would like boxes.  Please advise pt requiring another neb - offered admission but wants to watch 1 more hour. Gage Mendez M.D., Attending Physician pt with conitnued wheezing no sob or dyspnea will admit, spoke to mother and pt and agree, Spoke with Dr. Espitia DO pt with wheezing and oxygen sat of 95 no resp distress will give albuterol neb. DARA Rojas DO

## 2021-09-08 ENCOUNTER — TRANSCRIPTION ENCOUNTER (OUTPATIENT)
Age: 26
End: 2021-09-08

## 2021-10-12 ENCOUNTER — TRANSCRIPTION ENCOUNTER (OUTPATIENT)
Age: 26
End: 2021-10-12

## 2021-10-13 ENCOUNTER — TRANSCRIPTION ENCOUNTER (OUTPATIENT)
Age: 26
End: 2021-10-13

## 2021-11-29 ENCOUNTER — TRANSCRIPTION ENCOUNTER (OUTPATIENT)
Age: 26
End: 2021-11-29

## 2022-01-31 ENCOUNTER — TRANSCRIPTION ENCOUNTER (OUTPATIENT)
Age: 27
End: 2022-01-31

## 2022-04-03 ENCOUNTER — EMERGENCY (EMERGENCY)
Facility: HOSPITAL | Age: 27
LOS: 0 days | Discharge: HOME | End: 2022-04-03
Attending: STUDENT IN AN ORGANIZED HEALTH CARE EDUCATION/TRAINING PROGRAM
Payer: COMMERCIAL

## 2022-04-03 VITALS
DIASTOLIC BLOOD PRESSURE: 87 MMHG | HEART RATE: 66 BPM | OXYGEN SATURATION: 96 % | SYSTOLIC BLOOD PRESSURE: 130 MMHG | RESPIRATION RATE: 14 BRPM | TEMPERATURE: 98 F

## 2022-04-03 VITALS — WEIGHT: 216.93 LBS | HEIGHT: 69 IN

## 2022-04-03 DIAGNOSIS — J45.909 UNSPECIFIED ASTHMA, UNCOMPLICATED: ICD-10-CM

## 2022-04-03 DIAGNOSIS — G47.33 OBSTRUCTIVE SLEEP APNEA (ADULT) (PEDIATRIC): Chronic | ICD-10-CM

## 2022-04-03 DIAGNOSIS — H53.8 OTHER VISUAL DISTURBANCES: ICD-10-CM

## 2022-04-03 DIAGNOSIS — G47.37 CENTRAL SLEEP APNEA IN CONDITIONS CLASSIFIED ELSEWHERE: ICD-10-CM

## 2022-04-03 DIAGNOSIS — Z20.822 CONTACT WITH AND (SUSPECTED) EXPOSURE TO COVID-19: ICD-10-CM

## 2022-04-03 DIAGNOSIS — R42 DIZZINESS AND GIDDINESS: ICD-10-CM

## 2022-04-03 DIAGNOSIS — Z98.890 OTHER SPECIFIED POSTPROCEDURAL STATES: Chronic | ICD-10-CM

## 2022-04-03 LAB
ALBUMIN SERPL ELPH-MCNC: 4 G/DL — SIGNIFICANT CHANGE UP (ref 3.3–5)
ALP SERPL-CCNC: 79 U/L — SIGNIFICANT CHANGE UP (ref 40–120)
ALT FLD-CCNC: 62 U/L — SIGNIFICANT CHANGE UP (ref 12–78)
ANION GAP SERPL CALC-SCNC: 4 MMOL/L — LOW (ref 5–17)
AST SERPL-CCNC: 24 U/L — SIGNIFICANT CHANGE UP (ref 15–37)
BASOPHILS # BLD AUTO: 0.14 K/UL — SIGNIFICANT CHANGE UP (ref 0–0.2)
BASOPHILS NFR BLD AUTO: 2 % — SIGNIFICANT CHANGE UP (ref 0–2)
BILIRUB SERPL-MCNC: 0.8 MG/DL — SIGNIFICANT CHANGE UP (ref 0.2–1.2)
BUN SERPL-MCNC: 15 MG/DL — SIGNIFICANT CHANGE UP (ref 7–23)
CALCIUM SERPL-MCNC: 9.1 MG/DL — SIGNIFICANT CHANGE UP (ref 8.5–10.1)
CHLORIDE SERPL-SCNC: 107 MMOL/L — SIGNIFICANT CHANGE UP (ref 96–108)
CO2 SERPL-SCNC: 27 MMOL/L — SIGNIFICANT CHANGE UP (ref 22–31)
CREAT SERPL-MCNC: 0.94 MG/DL — SIGNIFICANT CHANGE UP (ref 0.5–1.3)
EGFR: 114 ML/MIN/1.73M2 — SIGNIFICANT CHANGE UP
EOSINOPHIL # BLD AUTO: 1.21 K/UL — HIGH (ref 0–0.5)
EOSINOPHIL NFR BLD AUTO: 17.2 % — HIGH (ref 0–6)
GLUCOSE SERPL-MCNC: 96 MG/DL — SIGNIFICANT CHANGE UP (ref 70–99)
HCT VFR BLD CALC: 44.9 % — SIGNIFICANT CHANGE UP (ref 39–50)
HGB BLD-MCNC: 15.1 G/DL — SIGNIFICANT CHANGE UP (ref 13–17)
IMM GRANULOCYTES NFR BLD AUTO: 0.3 % — SIGNIFICANT CHANGE UP (ref 0–1.5)
LYMPHOCYTES # BLD AUTO: 1.98 K/UL — SIGNIFICANT CHANGE UP (ref 1–3.3)
LYMPHOCYTES # BLD AUTO: 28.1 % — SIGNIFICANT CHANGE UP (ref 13–44)
MAGNESIUM SERPL-MCNC: 2.3 MG/DL — SIGNIFICANT CHANGE UP (ref 1.6–2.6)
MCHC RBC-ENTMCNC: 30.6 PG — SIGNIFICANT CHANGE UP (ref 27–34)
MCHC RBC-ENTMCNC: 33.6 GM/DL — SIGNIFICANT CHANGE UP (ref 32–36)
MCV RBC AUTO: 91.1 FL — SIGNIFICANT CHANGE UP (ref 80–100)
MONOCYTES # BLD AUTO: 0.51 K/UL — SIGNIFICANT CHANGE UP (ref 0–0.9)
MONOCYTES NFR BLD AUTO: 7.2 % — SIGNIFICANT CHANGE UP (ref 2–14)
NEUTROPHILS # BLD AUTO: 3.18 K/UL — SIGNIFICANT CHANGE UP (ref 1.8–7.4)
NEUTROPHILS NFR BLD AUTO: 45.2 % — SIGNIFICANT CHANGE UP (ref 43–77)
PHOSPHATE SERPL-MCNC: 2.3 MG/DL — LOW (ref 2.5–4.5)
PLATELET # BLD AUTO: 240 K/UL — SIGNIFICANT CHANGE UP (ref 150–400)
POTASSIUM SERPL-MCNC: 4 MMOL/L — SIGNIFICANT CHANGE UP (ref 3.5–5.3)
POTASSIUM SERPL-SCNC: 4 MMOL/L — SIGNIFICANT CHANGE UP (ref 3.5–5.3)
PROT SERPL-MCNC: 7.7 GM/DL — SIGNIFICANT CHANGE UP (ref 6–8.3)
RBC # BLD: 4.93 M/UL — SIGNIFICANT CHANGE UP (ref 4.2–5.8)
RBC # FLD: 13.2 % — SIGNIFICANT CHANGE UP (ref 10.3–14.5)
SARS-COV-2 RNA SPEC QL NAA+PROBE: SIGNIFICANT CHANGE UP
SODIUM SERPL-SCNC: 138 MMOL/L — SIGNIFICANT CHANGE UP (ref 135–145)
WBC # BLD: 7.04 K/UL — SIGNIFICANT CHANGE UP (ref 3.8–10.5)
WBC # FLD AUTO: 7.04 K/UL — SIGNIFICANT CHANGE UP (ref 3.8–10.5)

## 2022-04-03 PROCEDURE — 70498 CT ANGIOGRAPHY NECK: CPT | Mod: 26,MG

## 2022-04-03 PROCEDURE — 80053 COMPREHEN METABOLIC PANEL: CPT

## 2022-04-03 PROCEDURE — 70496 CT ANGIOGRAPHY HEAD: CPT | Mod: 26,MG

## 2022-04-03 PROCEDURE — G1004: CPT

## 2022-04-03 PROCEDURE — 70498 CT ANGIOGRAPHY NECK: CPT | Mod: MG

## 2022-04-03 PROCEDURE — 99285 EMERGENCY DEPT VISIT HI MDM: CPT

## 2022-04-03 PROCEDURE — 96374 THER/PROPH/DIAG INJ IV PUSH: CPT

## 2022-04-03 PROCEDURE — 70496 CT ANGIOGRAPHY HEAD: CPT | Mod: MG

## 2022-04-03 PROCEDURE — 84100 ASSAY OF PHOSPHORUS: CPT

## 2022-04-03 PROCEDURE — 36415 COLL VENOUS BLD VENIPUNCTURE: CPT

## 2022-04-03 PROCEDURE — 83735 ASSAY OF MAGNESIUM: CPT

## 2022-04-03 PROCEDURE — 99284 EMERGENCY DEPT VISIT MOD MDM: CPT | Mod: 25

## 2022-04-03 PROCEDURE — U0003: CPT

## 2022-04-03 PROCEDURE — U0005: CPT

## 2022-04-03 PROCEDURE — 85025 COMPLETE CBC W/AUTO DIFF WBC: CPT

## 2022-04-03 RX ADMIN — Medication 125 MILLIGRAM(S): at 15:18

## 2022-04-03 NOTE — ED STATDOCS - OBJECTIVE STATEMENT
26 M hx RAD, esophageal diverticulum, GERD, eczema, asthma, deviated nasal septum, atopic dermatitis, mitral valve disorder, sleep apnea here c/o blurry vision and left eye pain x 5 days. pt reports he has a swollen optic nerve x 6 weeks in setting of being dx with optic neuritis. pt has been seeing ophthalmologist and has had MRI and other extensive outpatient workup for this; pt currently on a taper for prednisone, which pt states symptoms have been worsening. pt states that vision has been worsening; reports last night he was driving home and was having double vision. pt with upcoming appointment to see ophthalmologist 4/6. pt endorses blurry vision and left eye pain currently.  no weakness or numbness. pt states sometimes he also feels lightheaded and dizzy.

## 2022-04-03 NOTE — ED STATDOCS - NS ED ATTENDING STATEMENT MOD
Attending with This was a shared visit with the MIHAI. I reviewed and verified the documentation and independently performed the documented:

## 2022-04-03 NOTE — ED ADULT NURSE NOTE - CAS ELECT INFOMATION PROVIDED
Pt came in for labs today and states needing refills of test strips and his Goodman sent to [facility], please.    Also, asks for handwritten Rx for the [facility] 2 glucose system, which [facility] would [facility]. He think they may be able to pay for it and he wouldn't have to stick his fingers so much.     Please call pt when ready for pickup.   DC instructions

## 2022-04-03 NOTE — ED STATDOCS - NSICDXPASTMEDICALHX_GEN_ALL_CORE_FT
PAST MEDICAL HISTORY:  Asthma history of ICU admission for Exacerbation in 2017), no history of intubations    Asthma     Atopic dermatitis     Deviated nasal septum     Eczema     Eczema     Esophageal diverticulum     GERD (gastroesophageal reflux disease)     Mitral valve disorder cleft in mitral valve - congenital, asymptomatic    RAD (reactive airway disease)     Sleep apnea resolved, s/p Mandibular advancement with angioplasty (7/2016)

## 2022-04-03 NOTE — ED STATDOCS - PATIENT PORTAL LINK FT
You can access the FollowMyHealth Patient Portal offered by [facility] by registering at the following website: [email or web address]. By joining [employer]’s FollowMyHealth portal, you will also be able to view your health information using other applications (apps) compatible with our system.

## 2022-04-03 NOTE — ED STATDOCS - NSICDXPASTSURGICALHX_GEN_ALL_CORE_FT
PAST SURGICAL HISTORY:  H/O rhinoplasty     SETH (obstructive sleep apnea) Mandibular advancement with angioplasty 7/2016

## 2022-04-03 NOTE — ED STATDOCS - PROGRESS NOTE DETAILS
PA note: All labwork/radiology results discussed in detail with patient. Patient re-examined and re-evaluated. Patient feels much better at this time. ED evaluation, Diagnosis and management discussed with the patient in detail. Workup results discussed with ED attending, [place] to [place] home. Close OPHTHALMOLOGY AND NEUROLOGY follow up encouraged, patient has OPHTHALMOLOGY appt tomorrow, and NEURO appt on Wednesday. Strict ED return instructions discussed in detail and patient given the opportunity to ask any questions about their discharge diagnosis and instructions. Patient verbalized understanding. ~ Cheikh Ball PA-C PA: Patient is a 26 year old male with PMHx of RAD, esophageal diverticulum, GERD, eczema, asthma, deviated nasal septum, atopic dermatitis, mitral valve disorder, sleep apnea who presents to [facility] c/o blurry vision and left eye pain x 5 days. pt reports he has a swollen optic nerve x 6 weeks in setting of being dx with optic neuritis. pt has been seeing ophthalmologist and has had MRI and other extensive outpatient workup for this; pt currently on a taper for prednisone, which pt states symptoms have been worsening. pt states that vision has been worsening; reports last night he was driving home and was having double vision. pt with upcoming appointment to see ophthalmologist 4/6. pt endorses blurry vision and left eye pain currently. ~Cheikh Ball PA-C

## 2022-04-03 NOTE — ED STATDOCS - NSFOLLOWUPINSTRUCTIONS_ED_ALL_ED_FT
OPTIC NEURITIS - General Information           Optic Neuritis    WHAT YOU NEED TO KNOW:    What is optic neuritis? Optic neuritis is inflammation of the optic nerve. The optic nerve helps you see by sending an image from the eye to the brain. Inflammation of the nerve leads to vision loss. Usually only one eye is affected, but you may have optic neuritis in both eyes.    What causes or increases my risk for optic neuritis?   •Demyelination (loss of protective covering) of the optic nerve      •An infection, such as hepatitis or tuberculosis (TB)      •An autoimmune disease such as multiple sclerosis (MS)      •An inflammatory disease such as lupus or certain eye infections      •Exposure to a chemical such as lead      •A tumor, or damage in an artery or vein that affects the optic nerve      •Certain medicines, such as some antibiotics and medicines used to treat malaria or TB      •A lack of vitamin B12      What are the signs and symptoms of optic neuritis? Vision loss can start within hours or days. The loss will be worst at 1 week. You may also have any of the following:   •Loss of vision, or trouble with depth perception (seeing how far away an object is)      •Seeing lights as dim or less bright than usual, or trouble seeing in direct sunlight      •Tenderness around your eye that is worse when you move your eye      •A blurry spot or quick flashes of light       •Trouble seeing color, especially red      •Blurred or foggy vision      •A headache (more common in children)      •Signs and symptoms that happen only when you exercise or are hot      How is optic neuritis diagnosed? Your healthcare provider will ask about your symptoms and when they began. He will check how your nervous system is working, and examine your overall health. You may also need any of the following:   •Blood tests may be used to check for certain antibodies. Antibodies are part of your immune system. These antibodies may be attacking your optic nerve.      •An eye exam will show how well you see shapes and colors. Your healthcare provider will use an instrument such as an ophthalmoscope to see into the back of your eye. He may also shine a flashlight to see how your pupils react to light.       •MRI pictures may show problems with your optic nerve or other parts of your eye and brain. You may be given contrast liquid to help your eye show up better in the pictures. Tell the healthcare provider if you have ever had an allergic reaction to contrast liquid. Do not enter the MRI room with anything metal. Metal can cause serious damage. Tell the healthcare provider if you have any metal in or on your body.      •A visual evoked response test is used to show your brain's reaction to what you see. You will look at a screen that shows different patterns. Your brainwaves are measured through wires attached to patches that are placed on your head.      How is optic neuritis treated? Optic neuritis may go away on its own without treatment within a few months. It may take a year before your vision returns to normal or near normal. Even with treatment, your vision may not return completely to normal. If you have severe vision loss, steroid medicine may be used to reduce inflammation. The medicine may be given through an IV to help your vision clear more quickly. This may be done if you only have vision in 1 eye or you need to see clearly for work.     When should I seek immediate care?   •You have vision loss in both eyes along with a headache or double vision.          When should I contact my healthcare provider?   •You have new or worsening symptoms.      •Your symptoms do not improve within 1 to 2 weeks.      •You have questions or concerns about your condition or care.      CARE AGREEMENT:    You have the right to help plan your care. Learn about your health condition and how it may be treated. Discuss treatment options with your healthcare providers to decide what care you want to receive. You always have the right to refuse treatment.

## 2022-04-03 NOTE — ED STATDOCS - CLINICAL SUMMARY MEDICAL DECISION MAKING FREE TEXT BOX
26 M PMH recently diagnosed optic neuritis p/w left eye pain and blurriness for 5 days. suggestive of worsening optic neuritis. will evaluate with labs, CT angio and will reassess. possible admission for persisting optic neuritis. 26 M PMH recently diagnosed optic neuritis p/w left eye pain and blurriness for 5 days. suggestive of worsening optic neuritis. will evaluate with labs, CT angio and will reassess. possible admission for persisting optic neuritis.      PA note: All labwork/radiology results discussed in detail with patient. Patient re-examined and re-evaluated. Patient feels much better at this time. ED evaluation, Diagnosis and management discussed with the patient in detail. Workup results discussed with ED attending, OK to dc home. Close OPHTHALMOLOGY AND NEUROLOGY follow up encouraged, patient has OPHTHALMOLOGY appt tomorrow, and NEURO appt on Wednesday. Strict ED return instructions discussed in detail and patient given the opportunity to ask any questions about their discharge diagnosis and instructions. Patient verbalized understanding. ~ Cheikh Ball PA-C

## 2022-04-03 NOTE — ED STATDOCS - NSICDXFAMILYHX_GEN_ALL_CORE_FT
FAMILY HISTORY:  No pertinent family history in first degree relatives, of note: pt is adopted unknown family history

## 2022-04-03 NOTE — ED STATDOCS - ATTENDING CONTRIBUTION TO CARE
IRICHARD MD,  performed the initial face to face bedside interview with this patient regarding history of present illness, review of symptoms and relevant past medical, social and family history.  I completed an independent physical examination.  I was the initial provider who evaluated this patient. I have signed out the follow up of any pending tests (i.e. labs, radiological studies) to the ACP/resident.  I have communicated the patient’s plan of care and disposition with the ACP/resident.  The history, relevant review of systems, past medical and surgical history, medical decision making, and physical examination was documented by the scribe in my presence and I attest to the accuracy of the documentation.

## 2022-04-03 NOTE — ED ADULT NURSE NOTE - NSIMPLEMENTINTERV_GEN_ALL_ED
Implemented All Fall with Harm Risk Interventions:  [place] to call system. Call bell, personal items and telephone within reach. Instruct patient to call for assistance. Room bathroom lighting operational. Non-slip footwear when patient is off stretcher. Physically safe environment: no spills, clutter or unnecessary equipment. Stretcher in lowest position, wheels locked, appropriate side rails in place. Provide visual cue, wrist band, yellow gown, etc. Monitor gait and stability. Monitor for mental status changes and reorient to person, place, and time. Review medications for side effects contributing to fall risk. Reinforce activity limits and safety measures with patient and family. Provide visual clues: red socks.

## 2022-04-03 NOTE — ED ADULT TRIAGE NOTE - CHIEF COMPLAINT QUOTE
pt dx'd optic neuritis x 1 month ago. c/o vision worsening since Wednesday. also has an embolism in right eye. treated with steroids and eye drops. pt sts he was unable to drive last night. BEFAST negative

## 2022-10-13 ENCOUNTER — NON-APPOINTMENT (OUTPATIENT)
Age: 27
End: 2022-10-13

## 2022-10-23 ENCOUNTER — NON-APPOINTMENT (OUTPATIENT)
Age: 27
End: 2022-10-23

## 2023-01-24 ENCOUNTER — NON-APPOINTMENT (OUTPATIENT)
Age: 28
End: 2023-01-24

## 2023-02-01 ENCOUNTER — NON-APPOINTMENT (OUTPATIENT)
Age: 28
End: 2023-02-01

## 2023-03-12 ENCOUNTER — NON-APPOINTMENT (OUTPATIENT)
Age: 28
End: 2023-03-12

## 2023-04-11 ENCOUNTER — NON-APPOINTMENT (OUTPATIENT)
Age: 28
End: 2023-04-11

## 2023-04-12 ENCOUNTER — EMERGENCY (EMERGENCY)
Facility: HOSPITAL | Age: 28
LOS: 0 days | Discharge: HOME | End: 2023-04-12
Attending: STUDENT IN AN ORGANIZED HEALTH CARE EDUCATION/TRAINING PROGRAM
Payer: COMMERCIAL

## 2023-04-12 VITALS
HEART RATE: 90 BPM | TEMPERATURE: 98 F | DIASTOLIC BLOOD PRESSURE: 89 MMHG | RESPIRATION RATE: 18 BRPM | OXYGEN SATURATION: 97 % | SYSTOLIC BLOOD PRESSURE: 145 MMHG

## 2023-04-12 VITALS — WEIGHT: 229.94 LBS | HEIGHT: 66 IN

## 2023-04-12 DIAGNOSIS — K21.9 GASTRO-ESOPHAGEAL REFLUX DISEASE WITHOUT ESOPHAGITIS: ICD-10-CM

## 2023-04-12 DIAGNOSIS — Z20.822 CONTACT WITH AND (SUSPECTED) EXPOSURE TO COVID-19: ICD-10-CM

## 2023-04-12 DIAGNOSIS — G47.33 OBSTRUCTIVE SLEEP APNEA (ADULT) (PEDIATRIC): Chronic | ICD-10-CM

## 2023-04-12 DIAGNOSIS — R06.2 WHEEZING: ICD-10-CM

## 2023-04-12 DIAGNOSIS — Z91.09 OTHER ALLERGY STATUS, OTHER THAN TO DRUGS AND BIOLOGICAL SUBSTANCES: ICD-10-CM

## 2023-04-12 DIAGNOSIS — Z98.890 OTHER SPECIFIED POSTPROCEDURAL STATES: Chronic | ICD-10-CM

## 2023-04-12 DIAGNOSIS — L30.9 DERMATITIS, UNSPECIFIED: ICD-10-CM

## 2023-04-12 DIAGNOSIS — I05.9 RHEUMATIC MITRAL VALVE DISEASE, UNSPECIFIED: ICD-10-CM

## 2023-04-12 DIAGNOSIS — R00.0 TACHYCARDIA, UNSPECIFIED: ICD-10-CM

## 2023-04-12 DIAGNOSIS — J45.901 UNSPECIFIED ASTHMA WITH (ACUTE) EXACERBATION: ICD-10-CM

## 2023-04-12 LAB
ALBUMIN SERPL ELPH-MCNC: 4 G/DL — SIGNIFICANT CHANGE UP (ref 3.3–5)
ALP SERPL-CCNC: 91 U/L — SIGNIFICANT CHANGE UP (ref 40–120)
ALT FLD-CCNC: 78 U/L — SIGNIFICANT CHANGE UP (ref 12–78)
ANION GAP SERPL CALC-SCNC: 5 MMOL/L — SIGNIFICANT CHANGE UP (ref 5–17)
AST SERPL-CCNC: 35 U/L — SIGNIFICANT CHANGE UP (ref 15–37)
BASOPHILS # BLD AUTO: 0.02 K/UL — SIGNIFICANT CHANGE UP (ref 0–0.2)
BASOPHILS NFR BLD AUTO: 0.2 % — SIGNIFICANT CHANGE UP (ref 0–2)
BILIRUB SERPL-MCNC: 0.5 MG/DL — SIGNIFICANT CHANGE UP (ref 0.2–1.2)
BUN SERPL-MCNC: 13 MG/DL — SIGNIFICANT CHANGE UP (ref 7–23)
CALCIUM SERPL-MCNC: 9.3 MG/DL — SIGNIFICANT CHANGE UP (ref 8.5–10.1)
CHLORIDE SERPL-SCNC: 112 MMOL/L — HIGH (ref 96–108)
CO2 SERPL-SCNC: 20 MMOL/L — LOW (ref 22–31)
CREAT SERPL-MCNC: 1.37 MG/DL — HIGH (ref 0.5–1.3)
EGFR: 72 ML/MIN/1.73M2 — SIGNIFICANT CHANGE UP
EOSINOPHIL # BLD AUTO: 0.01 K/UL — SIGNIFICANT CHANGE UP (ref 0–0.5)
EOSINOPHIL NFR BLD AUTO: 0.1 % — SIGNIFICANT CHANGE UP (ref 0–6)
GLUCOSE SERPL-MCNC: 134 MG/DL — HIGH (ref 70–99)
HCT VFR BLD CALC: 44.2 % — SIGNIFICANT CHANGE UP (ref 39–50)
HGB BLD-MCNC: 14.9 G/DL — SIGNIFICANT CHANGE UP (ref 13–17)
IMM GRANULOCYTES NFR BLD AUTO: 0.4 % — SIGNIFICANT CHANGE UP (ref 0–0.9)
LYMPHOCYTES # BLD AUTO: 0.59 K/UL — LOW (ref 1–3.3)
LYMPHOCYTES # BLD AUTO: 4.8 % — LOW (ref 13–44)
MCHC RBC-ENTMCNC: 31.4 PG — SIGNIFICANT CHANGE UP (ref 27–34)
MCHC RBC-ENTMCNC: 33.7 GM/DL — SIGNIFICANT CHANGE UP (ref 32–36)
MCV RBC AUTO: 93.2 FL — SIGNIFICANT CHANGE UP (ref 80–100)
MONOCYTES # BLD AUTO: 0.5 K/UL — SIGNIFICANT CHANGE UP (ref 0–0.9)
MONOCYTES NFR BLD AUTO: 4.1 % — SIGNIFICANT CHANGE UP (ref 2–14)
NEUTROPHILS # BLD AUTO: 11.02 K/UL — HIGH (ref 1.8–7.4)
NEUTROPHILS NFR BLD AUTO: 90.4 % — HIGH (ref 43–77)
PLATELET # BLD AUTO: 297 K/UL — SIGNIFICANT CHANGE UP (ref 150–400)
POTASSIUM SERPL-MCNC: 3.5 MMOL/L — SIGNIFICANT CHANGE UP (ref 3.5–5.3)
POTASSIUM SERPL-SCNC: 3.5 MMOL/L — SIGNIFICANT CHANGE UP (ref 3.5–5.3)
PROT SERPL-MCNC: 8 GM/DL — SIGNIFICANT CHANGE UP (ref 6–8.3)
RAPID RVP RESULT: DETECTED
RBC # BLD: 4.74 M/UL — SIGNIFICANT CHANGE UP (ref 4.2–5.8)
RBC # FLD: 14.1 % — SIGNIFICANT CHANGE UP (ref 10.3–14.5)
RV+EV RNA SPEC QL NAA+PROBE: DETECTED
SARS-COV-2 RNA SPEC QL NAA+PROBE: SIGNIFICANT CHANGE UP
SODIUM SERPL-SCNC: 137 MMOL/L — SIGNIFICANT CHANGE UP (ref 135–145)
WBC # BLD: 12.19 K/UL — HIGH (ref 3.8–10.5)
WBC # FLD AUTO: 12.19 K/UL — HIGH (ref 3.8–10.5)

## 2023-04-12 PROCEDURE — 93005 ELECTROCARDIOGRAM TRACING: CPT

## 2023-04-12 PROCEDURE — 71045 X-RAY EXAM CHEST 1 VIEW: CPT

## 2023-04-12 PROCEDURE — 93010 ELECTROCARDIOGRAM REPORT: CPT

## 2023-04-12 PROCEDURE — 85025 COMPLETE CBC W/AUTO DIFF WBC: CPT

## 2023-04-12 PROCEDURE — 99285 EMERGENCY DEPT VISIT HI MDM: CPT

## 2023-04-12 PROCEDURE — 71045 X-RAY EXAM CHEST 1 VIEW: CPT | Mod: 26

## 2023-04-12 PROCEDURE — 96374 THER/PROPH/DIAG INJ IV PUSH: CPT

## 2023-04-12 PROCEDURE — 0225U NFCT DS DNA&RNA 21 SARSCOV2: CPT

## 2023-04-12 PROCEDURE — 94640 AIRWAY INHALATION TREATMENT: CPT

## 2023-04-12 PROCEDURE — 80053 COMPREHEN METABOLIC PANEL: CPT

## 2023-04-12 PROCEDURE — 36415 COLL VENOUS BLD VENIPUNCTURE: CPT

## 2023-04-12 PROCEDURE — 96375 TX/PRO/DX INJ NEW DRUG ADDON: CPT

## 2023-04-12 PROCEDURE — 99285 EMERGENCY DEPT VISIT HI MDM: CPT | Mod: 25

## 2023-04-12 RX ORDER — MAGNESIUM SULFATE 500 MG/ML
2 VIAL (ML) INJECTION ONCE
Refills: 0 | Status: COMPLETED | OUTPATIENT
Start: 2023-04-12 | End: 2023-04-12

## 2023-04-12 RX ORDER — IPRATROPIUM/ALBUTEROL SULFATE 18-103MCG
3 AEROSOL WITH ADAPTER (GRAM) INHALATION
Refills: 0 | Status: COMPLETED | OUTPATIENT
Start: 2023-04-12 | End: 2023-04-12

## 2023-04-12 RX ORDER — IPRATROPIUM/ALBUTEROL SULFATE 18-103MCG
3 AEROSOL WITH ADAPTER (GRAM) INHALATION EVERY 6 HOURS
Refills: 0 | Status: DISCONTINUED | OUTPATIENT
Start: 2023-04-12 | End: 2023-04-12

## 2023-04-12 RX ADMIN — Medication 150 GRAM(S): at 18:31

## 2023-04-12 RX ADMIN — Medication 125 MILLIGRAM(S): at 16:57

## 2023-04-12 RX ADMIN — Medication 3 MILLILITER(S): at 17:30

## 2023-04-12 RX ADMIN — Medication 3 MILLILITER(S): at 17:43

## 2023-04-12 RX ADMIN — Medication 3 MILLILITER(S): at 16:57

## 2023-04-12 NOTE — ED PROVIDER NOTE - OBJECTIVE STATEMENT
26 year old male with hx RAD, esophageal diverticulum, GERD, eczema on rinvoq, deviated nasal septum, atopic dermatitis, mitral valve disorder, sleep apnea, asthma on fasenra presents to ER c/o wheezing r/t asthma exacerbation. Onset of symptoms began yesterday morning and became exacerbated today. Pt had Albuterol PTA. Pt has hx of asthma exacerbations but no history of intubations. Patient went to work yesterday and used his inhaler throughout the day. Patient then went to the ED and was told he had an upper resp infection, was given prednisone. Patient has pulmonologist and recently saw allergist and was put on injections. Patient reports he has seasonal allergies. Denies fever.

## 2023-04-12 NOTE — ED PROVIDER NOTE - NS ED ATTENDING STATEMENT MOD
This was a shared visit with the MIHAI. I reviewed and verified the documentation and independently performed the documented:

## 2023-04-12 NOTE — ED PROVIDER NOTE - ATTENDING APP SHARED VISIT CONTRIBUTION OF CARE
I, Daisy Singh DO,  performed the initial face to face bedside interview with this patient regarding history of present illness, review of symptoms and relevant past medical, social and family history.  I completed an independent physical examination.  I was the initial provider who evaluated this patient.   I personally saw the patient and performed a substantive portion of the visit including all aspects of the medical decision making.  I have signed out the follow up of any pending tests (i.e. labs, radiological studies) to the ACP.  I have communicated the patient’s plan of care and disposition with the ACP.  The history, relevant review of systems, past medical and surgical history, medical decision making, and physical examination was documented by the scribe in my presence and I attest to the accuracy of the documentation.

## 2023-04-12 NOTE — ED PROVIDER NOTE - PATIENT PORTAL LINK FT
You can access the FollowMyHealth Patient Portal offered by [facility] by registering at the following website: [email or web address]. By joining [employer]’s FollowMyHealth portal, you will also be able to view your health information using other applications (apps) compatible with our system.

## 2023-04-12 NOTE — ED PROVIDER NOTE - PROGRESS NOTE DETAILS
Pt reassessed at bedside, on duoneb 2/3 and was given solumedrol. Notes a small amount of improvement, feels he does not have to work as hard to take a deep breath. Still with B/L diffuse wheezing on exam. Will do 3rd duoneb and reeval.  - MELANIA Malcolm Pt reevaluated after completing 3rd duoneb. Reports breathing does not feel as labored since when he first arrived to ED. B/L wheezing still present on exam. Will give 2mg magnesium and reeval.  - MELANIA Malcolm

## 2023-04-12 NOTE — ED ADULT NURSE NOTE - NSFALLRSKASSESSDT_ED_ALL_ED
Radha, school nurse at [employer], calls and leaves a voicemail stating they have noted some staring spells in the last week. Consent sent this morning, per [employer]. Returned call. The nurse is stating the staring spells have been getting longer and Gelacio Branch will be incontinent during the incidences. She had one episode yesterday that lasted about 2 minutes. They usually note about 1 staring episode a week. Advised to have mom call our office for plan of care. Agreeable.      Last WCE 1/27/22
12-Apr-2023 18:12

## 2023-04-12 NOTE — ED ADULT TRIAGE NOTE - CHIEF COMPLAINT QUOTE
Pt presents to ER c/o SOB/wheezing r/t asthma attack. Onset of symptoms began yesterday and became exacerbated today. Pt had Albuterol PTA

## 2023-04-12 NOTE — ED PROVIDER NOTE - CLINICAL SUMMARY MEDICAL DECISION MAKING FREE TEXT BOX
27 year old male with known hx of asthma presents for asthma exacerbation. Plan for nebs, IV steroids, basic labs, screening XR, and reeval. 27 year old male with known hx of asthma presents for asthma exacerbation. Plan for nebs, IV steroids, basic labs, screening XR, and reeval.    8:01 PM - Pt reassessed after 3 duonebs and 2mg mag. Pt reports great improvement in breathing. PE slight wheeze DENNYS, much improved since initial exam. Pt cleared to be DC'd and continue oral steroids and albuterol rx'd by urgent care. Pt to f/u with PCP in 1-2 days and given strict return precautions. Pt understands and agrees to plan  - MELANIA Malcolm

## 2023-04-12 NOTE — ED PROVIDER NOTE - CONSTITUTIONAL, MLM
Well appearing, awake, alert, oriented to person, place, time/situation and in mild resp distress. normal...

## 2023-04-12 NOTE — ED PROVIDER NOTE - NSFOLLOWUPINSTRUCTIONS_ED_ALL_ED_FT
Please call and follow up with your doctor in 1-3 days.    Return to the Emergency Department for worsening or persistent symptoms, and/or ANY NEW OR CONCERNING SYMPTOMS. If you have issues obtaining follow up, please call: [phone] or [phone]  to obtain a doctor or specialist who takes your insurance in your area.    Asthma    WHAT YOU NEED TO KNOW:    Asthma is a lung disease that makes breathing difficult. Chronic inflammation and reactions to triggers narrow the airways in the lungs. Asthma can become life-threatening if it is not managed.          DISCHARGE INSTRUCTIONS:    Call your local emergency number (911 in the ) if:     You have severe shortness of breath.       Your lips or nails turn blue or gray.       The skin around your neck and ribs pulls in with each breath.      You have shortness of breath, even after you take your short-term medicine as directed.       Your peak flow numbers are in the red zone of your AAP.     Call your doctor if:     You run out of medicine before your next refill is due.      Your symptoms get worse.       You need to take more medicine than usual to control your symptoms.       You have questions or concerns about your condition or care.    Medicines:     Medicines decrease inflammation, open airways, and make it easier to breathe. Medicines may be inhaled, taken as a pill, or injected. Short-term medicines relieve your symptoms quickly. Long-term medicines are used to prevent future attacks. You may also need medicine to help control your allergies. Ask your healthcare provider for more information about the medicine you are given and how to take it safely.      Take your medicine as directed. Contact your healthcare provider if you think your medicine is not helping or if you have side effects. Tell him of her if you are allergic to any medicine. Keep a list of the medicines, vitamins, and herbs you take. Include the amounts, and when and why you take them. Bring the list or the pill bottles to follow-up visits. Carry your medicine list with you in case of an emergency.    Follow up with your healthcare provider as directed: You will need to return to make sure your medicine is working and your symptoms are controlled. You may be referred to an asthma specialist. You may be asked to keep a record of your peak flow values and bring it with you to your appointments. Write down your questions so you remember to ask them during your visits.    Manage your symptoms and prevent future attacks:     Follow your Asthma Action Plan (AAP). This is a written plan that you and your healthcare provider create. It explains which medicine you need and when to change doses if necessary. It also explains how you can monitor symptoms and use a peak flow meter. The meter measures how well your lungs are working.       Manage other health conditions, such as allergies, acid reflux, and sleep apnea.       Identify and avoid triggers. These may include pets, dust mites, mold, and cockroaches.           Do not smoke or be around others who smoke. Nicotine and other chemicals in cigarettes and cigars can cause lung damage. Ask your healthcare provider for information if you currently smoke and need help to quit. E-cigarettes or smokeless tobacco still contain nicotine. Talk to your healthcare provider before you use these products.       Ask about the flu vaccine. The flu can make your asthma worse. You may need a yearly flu shot.

## 2023-05-06 ENCOUNTER — NON-APPOINTMENT (OUTPATIENT)
Age: 28
End: 2023-05-06

## 2023-05-22 ENCOUNTER — TRANSCRIPTION ENCOUNTER (OUTPATIENT)
Age: 28
End: 2023-05-22

## 2023-05-22 ENCOUNTER — INPATIENT (INPATIENT)
Facility: HOSPITAL | Age: 28
LOS: 2 days | Discharge: HOME | DRG: 493 | End: 2023-05-25
Attending: ORTHOPAEDIC SURGERY | Admitting: ORTHOPAEDIC SURGERY
Payer: COMMERCIAL

## 2023-05-22 VITALS
HEART RATE: 87 BPM | WEIGHT: 229.94 LBS | TEMPERATURE: 99 F | RESPIRATION RATE: 20 BRPM | HEIGHT: 66 IN | OXYGEN SATURATION: 97 % | DIASTOLIC BLOOD PRESSURE: 69 MMHG | SYSTOLIC BLOOD PRESSURE: 128 MMHG

## 2023-05-22 DIAGNOSIS — Z98.890 OTHER SPECIFIED POSTPROCEDURAL STATES: Chronic | ICD-10-CM

## 2023-05-22 DIAGNOSIS — S82.843A DISPLACED BIMALLEOLAR FRACTURE OF UNSPECIFIED LOWER LEG, INITIAL ENCOUNTER FOR CLOSED FRACTURE: ICD-10-CM

## 2023-05-22 DIAGNOSIS — G47.33 OBSTRUCTIVE SLEEP APNEA (ADULT) (PEDIATRIC): Chronic | ICD-10-CM

## 2023-05-22 LAB
ALBUMIN SERPL ELPH-MCNC: 3.7 G/DL — SIGNIFICANT CHANGE UP (ref 3.3–5)
ALP SERPL-CCNC: 67 U/L — SIGNIFICANT CHANGE UP (ref 40–120)
ALT FLD-CCNC: 83 U/L — HIGH (ref 12–78)
AMPHET UR-MCNC: NEGATIVE — SIGNIFICANT CHANGE UP
ANION GAP SERPL CALC-SCNC: 7 MMOL/L — SIGNIFICANT CHANGE UP (ref 5–17)
ANION GAP SERPL CALC-SCNC: 7 MMOL/L — SIGNIFICANT CHANGE UP (ref 5–17)
APTT BLD: 29.6 SEC — SIGNIFICANT CHANGE UP (ref 27.5–35.5)
AST SERPL-CCNC: 41 U/L — HIGH (ref 15–37)
BARBITURATES UR SCN-MCNC: NEGATIVE — SIGNIFICANT CHANGE UP
BASOPHILS # BLD AUTO: 0.04 K/UL — SIGNIFICANT CHANGE UP (ref 0–0.2)
BASOPHILS NFR BLD AUTO: 0.4 % — SIGNIFICANT CHANGE UP (ref 0–2)
BENZODIAZ UR-MCNC: NEGATIVE — SIGNIFICANT CHANGE UP
BILIRUB SERPL-MCNC: 0.2 MG/DL — SIGNIFICANT CHANGE UP (ref 0.2–1.2)
BLD GP AB SCN SERPL QL: SIGNIFICANT CHANGE UP
BUN SERPL-MCNC: 10 MG/DL — SIGNIFICANT CHANGE UP (ref 7–23)
BUN SERPL-MCNC: 10 MG/DL — SIGNIFICANT CHANGE UP (ref 7–23)
CALCIUM SERPL-MCNC: 7.7 MG/DL — LOW (ref 8.5–10.1)
CALCIUM SERPL-MCNC: 8.1 MG/DL — LOW (ref 8.5–10.1)
CHLORIDE SERPL-SCNC: 114 MMOL/L — HIGH (ref 96–108)
CHLORIDE SERPL-SCNC: 115 MMOL/L — HIGH (ref 96–108)
CO2 SERPL-SCNC: 21 MMOL/L — LOW (ref 22–31)
CO2 SERPL-SCNC: 21 MMOL/L — LOW (ref 22–31)
COCAINE METAB.OTHER UR-MCNC: NEGATIVE — SIGNIFICANT CHANGE UP
CREAT SERPL-MCNC: 1.14 MG/DL — SIGNIFICANT CHANGE UP (ref 0.5–1.3)
CREAT SERPL-MCNC: 1.15 MG/DL — SIGNIFICANT CHANGE UP (ref 0.5–1.3)
EGFR: 89 ML/MIN/1.73M2 — SIGNIFICANT CHANGE UP
EGFR: 90 ML/MIN/1.73M2 — SIGNIFICANT CHANGE UP
EOSINOPHIL # BLD AUTO: 0 K/UL — SIGNIFICANT CHANGE UP (ref 0–0.5)
EOSINOPHIL NFR BLD AUTO: 0 % — SIGNIFICANT CHANGE UP (ref 0–6)
ETHANOL SERPL-MCNC: 129 MG/DL — HIGH (ref 0–10)
GLUCOSE SERPL-MCNC: 122 MG/DL — HIGH (ref 70–99)
GLUCOSE SERPL-MCNC: 122 MG/DL — HIGH (ref 70–99)
HCT VFR BLD CALC: 41.7 % — SIGNIFICANT CHANGE UP (ref 39–50)
HCT VFR BLD CALC: 44.9 % — SIGNIFICANT CHANGE UP (ref 39–50)
HGB BLD-MCNC: 13.9 G/DL — SIGNIFICANT CHANGE UP (ref 13–17)
HGB BLD-MCNC: 15 G/DL — SIGNIFICANT CHANGE UP (ref 13–17)
IMM GRANULOCYTES NFR BLD AUTO: 0.5 % — SIGNIFICANT CHANGE UP (ref 0–0.9)
INR BLD: 0.97 RATIO — SIGNIFICANT CHANGE UP (ref 0.88–1.16)
LYMPHOCYTES # BLD AUTO: 1.29 K/UL — SIGNIFICANT CHANGE UP (ref 1–3.3)
LYMPHOCYTES # BLD AUTO: 11.3 % — LOW (ref 13–44)
MCHC RBC-ENTMCNC: 31.1 PG — SIGNIFICANT CHANGE UP (ref 27–34)
MCHC RBC-ENTMCNC: 31.6 PG — SIGNIFICANT CHANGE UP (ref 27–34)
MCHC RBC-ENTMCNC: 33.3 GM/DL — SIGNIFICANT CHANGE UP (ref 32–36)
MCHC RBC-ENTMCNC: 33.4 GM/DL — SIGNIFICANT CHANGE UP (ref 32–36)
MCV RBC AUTO: 93.2 FL — SIGNIFICANT CHANGE UP (ref 80–100)
MCV RBC AUTO: 94.8 FL — SIGNIFICANT CHANGE UP (ref 80–100)
METHADONE UR-MCNC: NEGATIVE — SIGNIFICANT CHANGE UP
MONOCYTES # BLD AUTO: 0.37 K/UL — SIGNIFICANT CHANGE UP (ref 0–0.9)
MONOCYTES NFR BLD AUTO: 3.3 % — SIGNIFICANT CHANGE UP (ref 2–14)
NEUTROPHILS # BLD AUTO: 9.61 K/UL — HIGH (ref 1.8–7.4)
NEUTROPHILS NFR BLD AUTO: 84.5 % — HIGH (ref 43–77)
OPIATES UR-MCNC: POSITIVE — SIGNIFICANT CHANGE UP
PCP SPEC-MCNC: SIGNIFICANT CHANGE UP
PCP UR-MCNC: NEGATIVE — SIGNIFICANT CHANGE UP
PLATELET # BLD AUTO: 301 K/UL — SIGNIFICANT CHANGE UP (ref 150–400)
PLATELET # BLD AUTO: 320 K/UL — SIGNIFICANT CHANGE UP (ref 150–400)
POTASSIUM SERPL-MCNC: 3.7 MMOL/L — SIGNIFICANT CHANGE UP (ref 3.5–5.3)
POTASSIUM SERPL-MCNC: 3.9 MMOL/L — SIGNIFICANT CHANGE UP (ref 3.5–5.3)
POTASSIUM SERPL-SCNC: 3.7 MMOL/L — SIGNIFICANT CHANGE UP (ref 3.5–5.3)
POTASSIUM SERPL-SCNC: 3.9 MMOL/L — SIGNIFICANT CHANGE UP (ref 3.5–5.3)
PROT SERPL-MCNC: 7.7 GM/DL — SIGNIFICANT CHANGE UP (ref 6–8.3)
PROTHROM AB SERPL-ACNC: 11.3 SEC — SIGNIFICANT CHANGE UP (ref 10.5–13.4)
RBC # BLD: 4.4 M/UL — SIGNIFICANT CHANGE UP (ref 4.2–5.8)
RBC # BLD: 4.82 M/UL — SIGNIFICANT CHANGE UP (ref 4.2–5.8)
RBC # FLD: 14.2 % — SIGNIFICANT CHANGE UP (ref 10.3–14.5)
RBC # FLD: 14.3 % — SIGNIFICANT CHANGE UP (ref 10.3–14.5)
SODIUM SERPL-SCNC: 142 MMOL/L — SIGNIFICANT CHANGE UP (ref 135–145)
SODIUM SERPL-SCNC: 143 MMOL/L — SIGNIFICANT CHANGE UP (ref 135–145)
THC UR QL: NEGATIVE — SIGNIFICANT CHANGE UP
WBC # BLD: 11.37 K/UL — HIGH (ref 3.8–10.5)
WBC # BLD: 16.79 K/UL — HIGH (ref 3.8–10.5)
WBC # FLD AUTO: 11.37 K/UL — HIGH (ref 3.8–10.5)
WBC # FLD AUTO: 16.79 K/UL — HIGH (ref 3.8–10.5)

## 2023-05-22 PROCEDURE — 76000 FLUOROSCOPY <1 HR PHYS/QHP: CPT

## 2023-05-22 PROCEDURE — 94640 AIRWAY INHALATION TREATMENT: CPT

## 2023-05-22 PROCEDURE — 85027 COMPLETE CBC AUTOMATED: CPT

## 2023-05-22 PROCEDURE — 73600 X-RAY EXAM OF ANKLE: CPT | Mod: 26,59,RT

## 2023-05-22 PROCEDURE — 97116 GAIT TRAINING THERAPY: CPT | Mod: GP

## 2023-05-22 PROCEDURE — 80048 BASIC METABOLIC PNL TOTAL CA: CPT

## 2023-05-22 PROCEDURE — 71045 X-RAY EXAM CHEST 1 VIEW: CPT | Mod: 26

## 2023-05-22 PROCEDURE — C1713: CPT

## 2023-05-22 PROCEDURE — 76376 3D RENDER W/INTRP POSTPROCES: CPT

## 2023-05-22 PROCEDURE — 99221 1ST HOSP IP/OBS SF/LOW 40: CPT

## 2023-05-22 PROCEDURE — 36415 COLL VENOUS BLD VENIPUNCTURE: CPT

## 2023-05-22 PROCEDURE — 97162 PT EVAL MOD COMPLEX 30 MIN: CPT | Mod: GP

## 2023-05-22 PROCEDURE — 99285 EMERGENCY DEPT VISIT HI MDM: CPT

## 2023-05-22 PROCEDURE — 76376 3D RENDER W/INTRP POSTPROCES: CPT | Mod: 26

## 2023-05-22 PROCEDURE — 77071 MNL APPL STRS JT RADIOGRAPHY: CPT

## 2023-05-22 PROCEDURE — 73700 CT LOWER EXTREMITY W/O DYE: CPT | Mod: RT

## 2023-05-22 PROCEDURE — 73590 X-RAY EXAM OF LOWER LEG: CPT | Mod: 26,RT

## 2023-05-22 PROCEDURE — 99222 1ST HOSP IP/OBS MODERATE 55: CPT | Mod: 57

## 2023-05-22 PROCEDURE — 73700 CT LOWER EXTREMITY W/O DYE: CPT | Mod: 26,RT

## 2023-05-22 PROCEDURE — 73610 X-RAY EXAM OF ANKLE: CPT | Mod: 26,RT

## 2023-05-22 PROCEDURE — 93005 ELECTROCARDIOGRAM TRACING: CPT

## 2023-05-22 PROCEDURE — 73560 X-RAY EXAM OF KNEE 1 OR 2: CPT | Mod: 26,RT

## 2023-05-22 PROCEDURE — 27829 TREAT LOWER LEG JOINT: CPT | Mod: RT

## 2023-05-22 RX ORDER — CEFAZOLIN SODIUM 1 G
2000 VIAL (EA) INJECTION ONCE
Refills: 0 | Status: COMPLETED | OUTPATIENT
Start: 2023-05-22 | End: 2023-05-22

## 2023-05-22 RX ORDER — PANTOPRAZOLE SODIUM 20 MG/1
1 TABLET, DELAYED RELEASE ORAL
Qty: 0 | Refills: 0 | DISCHARGE

## 2023-05-22 RX ORDER — SODIUM CHLORIDE 9 MG/ML
1000 INJECTION, SOLUTION INTRAVENOUS
Refills: 0 | Status: DISCONTINUED | OUTPATIENT
Start: 2023-05-22 | End: 2023-05-23

## 2023-05-22 RX ORDER — ONDANSETRON 8 MG/1
4 TABLET, FILM COATED ORAL ONCE
Refills: 0 | Status: COMPLETED | OUTPATIENT
Start: 2023-05-22 | End: 2023-05-22

## 2023-05-22 RX ORDER — ACETAMINOPHEN 500 MG
2 TABLET ORAL
Qty: 84 | Refills: 0
Start: 2023-05-22 | End: 2023-06-04

## 2023-05-22 RX ORDER — ACETAMINOPHEN 500 MG
975 TABLET ORAL EVERY 8 HOURS
Refills: 0 | Status: DISCONTINUED | OUTPATIENT
Start: 2023-05-22 | End: 2023-05-25

## 2023-05-22 RX ORDER — GABAPENTIN 400 MG/1
0 CAPSULE ORAL
Qty: 0 | Refills: 0 | DISCHARGE

## 2023-05-22 RX ORDER — ACETAMINOPHEN 500 MG
1000 TABLET ORAL ONCE
Refills: 0 | Status: COMPLETED | OUTPATIENT
Start: 2023-05-22 | End: 2023-05-22

## 2023-05-22 RX ORDER — SODIUM CHLORIDE 9 MG/ML
1000 INJECTION INTRAMUSCULAR; INTRAVENOUS; SUBCUTANEOUS ONCE
Refills: 0 | Status: COMPLETED | OUTPATIENT
Start: 2023-05-22 | End: 2023-05-22

## 2023-05-22 RX ORDER — RUXOLITINIB 15 MG/G
1 CREAM TOPICAL
Refills: 0 | DISCHARGE

## 2023-05-22 RX ORDER — ACETAMINOPHEN 500 MG
1000 TABLET ORAL ONCE
Refills: 0 | Status: COMPLETED | OUTPATIENT
Start: 2023-05-22 | End: 2023-05-23

## 2023-05-22 RX ORDER — MAGNESIUM HYDROXIDE 400 MG/1
30 TABLET, CHEWABLE ORAL DAILY
Refills: 0 | Status: DISCONTINUED | OUTPATIENT
Start: 2023-05-22 | End: 2023-05-25

## 2023-05-22 RX ORDER — BUDESONIDE AND FORMOTEROL FUMARATE DIHYDRATE 160; 4.5 UG/1; UG/1
2 AEROSOL RESPIRATORY (INHALATION)
Refills: 0 | DISCHARGE

## 2023-05-22 RX ORDER — FENTANYL CITRATE 50 UG/ML
25 INJECTION INTRAVENOUS
Refills: 0 | Status: DISCONTINUED | OUTPATIENT
Start: 2023-05-22 | End: 2023-05-23

## 2023-05-22 RX ORDER — PANTOPRAZOLE SODIUM 20 MG/1
1 TABLET, DELAYED RELEASE ORAL
Qty: 30 | Refills: 0
Start: 2023-05-22 | End: 2023-06-20

## 2023-05-22 RX ORDER — ACETAZOLAMIDE 250 MG/1
500 TABLET ORAL DAILY
Refills: 0 | Status: DISCONTINUED | OUTPATIENT
Start: 2023-05-22 | End: 2023-05-25

## 2023-05-22 RX ORDER — ONDANSETRON 8 MG/1
1 TABLET, FILM COATED ORAL
Qty: 15 | Refills: 0
Start: 2023-05-22 | End: 2023-05-26

## 2023-05-22 RX ORDER — HYDROMORPHONE HYDROCHLORIDE 2 MG/ML
0.5 INJECTION INTRAMUSCULAR; INTRAVENOUS; SUBCUTANEOUS
Refills: 0 | Status: DISCONTINUED | OUTPATIENT
Start: 2023-05-22 | End: 2023-05-23

## 2023-05-22 RX ORDER — ONDANSETRON 8 MG/1
4 TABLET, FILM COATED ORAL ONCE
Refills: 0 | Status: DISCONTINUED | OUTPATIENT
Start: 2023-05-22 | End: 2023-05-23

## 2023-05-22 RX ORDER — ALBUTEROL 90 UG/1
2 AEROSOL, METERED ORAL
Refills: 0 | DISCHARGE

## 2023-05-22 RX ORDER — HYDROCORTISONE 1 %
1 OINTMENT (GRAM) TOPICAL
Qty: 0 | Refills: 0 | DISCHARGE

## 2023-05-22 RX ORDER — FLUTICASONE PROPIONATE AND SALMETEROL 50; 250 UG/1; UG/1
1 POWDER ORAL; RESPIRATORY (INHALATION)
Qty: 0 | Refills: 0 | DISCHARGE

## 2023-05-22 RX ORDER — ACETAZOLAMIDE 250 MG/1
1 TABLET ORAL
Refills: 0 | DISCHARGE

## 2023-05-22 RX ORDER — RANITIDINE HYDROCHLORIDE 150 MG/1
0 TABLET, FILM COATED ORAL
Qty: 0 | Refills: 0 | DISCHARGE

## 2023-05-22 RX ORDER — ONDANSETRON 8 MG/1
4 TABLET, FILM COATED ORAL EVERY 6 HOURS
Refills: 0 | Status: DISCONTINUED | OUTPATIENT
Start: 2023-05-22 | End: 2023-05-25

## 2023-05-22 RX ORDER — FENTANYL CITRATE 50 UG/ML
50 INJECTION INTRAVENOUS
Refills: 0 | Status: DISCONTINUED | OUTPATIENT
Start: 2023-05-22 | End: 2023-05-22

## 2023-05-22 RX ORDER — POLYETHYLENE GLYCOL 3350 17 G/17G
17 POWDER, FOR SOLUTION ORAL AT BEDTIME
Refills: 0 | Status: DISCONTINUED | OUTPATIENT
Start: 2023-05-22 | End: 2023-05-25

## 2023-05-22 RX ORDER — OXYCODONE HYDROCHLORIDE 5 MG/1
5 TABLET ORAL EVERY 4 HOURS
Refills: 0 | Status: DISCONTINUED | OUTPATIENT
Start: 2023-05-23 | End: 2023-05-25

## 2023-05-22 RX ORDER — TACROLIMUS/VEHICLE BASE NO.238 0.1 %
1 CREAM (GRAM) TOPICAL
Qty: 0 | Refills: 0 | DISCHARGE

## 2023-05-22 RX ORDER — POLYETHYLENE GLYCOL 3350 17 G/17G
17 POWDER, FOR SOLUTION ORAL
Qty: 1 | Refills: 0
Start: 2023-05-22 | End: 2023-06-04

## 2023-05-22 RX ORDER — ESCITALOPRAM OXALATE 10 MG/1
0 TABLET, FILM COATED ORAL
Qty: 0 | Refills: 0 | DISCHARGE

## 2023-05-22 RX ORDER — OXYCODONE HYDROCHLORIDE 5 MG/1
10 TABLET ORAL
Refills: 0 | Status: DISCONTINUED | OUTPATIENT
Start: 2023-05-22 | End: 2023-05-22

## 2023-05-22 RX ORDER — HYDROMORPHONE HYDROCHLORIDE 2 MG/ML
1 INJECTION INTRAMUSCULAR; INTRAVENOUS; SUBCUTANEOUS ONCE
Refills: 0 | Status: DISCONTINUED | OUTPATIENT
Start: 2023-05-22 | End: 2023-05-22

## 2023-05-22 RX ORDER — ALBUTEROL 90 UG/1
1 AEROSOL, METERED ORAL ONCE
Refills: 0 | Status: DISCONTINUED | OUTPATIENT
Start: 2023-05-22 | End: 2023-05-25

## 2023-05-22 RX ORDER — SENNA PLUS 8.6 MG/1
2 TABLET ORAL AT BEDTIME
Refills: 0 | Status: DISCONTINUED | OUTPATIENT
Start: 2023-05-22 | End: 2023-05-25

## 2023-05-22 RX ORDER — SERTRALINE 25 MG/1
1.5 TABLET, FILM COATED ORAL
Refills: 0 | DISCHARGE

## 2023-05-22 RX ORDER — GABAPENTIN 400 MG/1
300 CAPSULE ORAL
Refills: 0 | Status: DISCONTINUED | OUTPATIENT
Start: 2023-05-22 | End: 2023-05-25

## 2023-05-22 RX ORDER — BENRALIZUMAB 30 MG/ML
30 INJECTION, SOLUTION SUBCUTANEOUS
Qty: 0 | Refills: 0 | DISCHARGE

## 2023-05-22 RX ORDER — UPADACITINIB 45 MG/1
1 TABLET, EXTENDED RELEASE ORAL
Refills: 0 | DISCHARGE

## 2023-05-22 RX ORDER — SERTRALINE 25 MG/1
50 TABLET, FILM COATED ORAL DAILY
Refills: 0 | Status: DISCONTINUED | OUTPATIENT
Start: 2023-05-22 | End: 2023-05-25

## 2023-05-22 RX ORDER — OXYCODONE HYDROCHLORIDE 5 MG/1
1 TABLET ORAL
Qty: 30 | Refills: 0
Start: 2023-05-22 | End: 2023-05-26

## 2023-05-22 RX ORDER — GABAPENTIN 400 MG/1
1 CAPSULE ORAL
Refills: 0 | DISCHARGE

## 2023-05-22 RX ORDER — ALBUTEROL 90 UG/1
2 AEROSOL, METERED ORAL EVERY 6 HOURS
Refills: 0 | Status: DISCONTINUED | OUTPATIENT
Start: 2023-05-22 | End: 2023-05-25

## 2023-05-22 RX ORDER — SERTRALINE 25 MG/1
1 TABLET, FILM COATED ORAL
Refills: 0 | DISCHARGE

## 2023-05-22 RX ORDER — SODIUM CHLORIDE 9 MG/ML
1000 INJECTION INTRAMUSCULAR; INTRAVENOUS; SUBCUTANEOUS
Refills: 0 | Status: DISCONTINUED | OUTPATIENT
Start: 2023-05-22 | End: 2023-05-25

## 2023-05-22 RX ORDER — FENTANYL CITRATE 50 UG/ML
25 INJECTION INTRAVENOUS
Refills: 0 | Status: DISCONTINUED | OUTPATIENT
Start: 2023-05-22 | End: 2023-05-22

## 2023-05-22 RX ORDER — OXYCODONE HYDROCHLORIDE 5 MG/1
5 TABLET ORAL
Refills: 0 | Status: DISCONTINUED | OUTPATIENT
Start: 2023-05-22 | End: 2023-05-22

## 2023-05-22 RX ORDER — ONDANSETRON 8 MG/1
4 TABLET, FILM COATED ORAL EVERY 8 HOURS
Refills: 0 | Status: DISCONTINUED | OUTPATIENT
Start: 2023-05-23 | End: 2023-05-25

## 2023-05-22 RX ORDER — SENNA PLUS 8.6 MG/1
2 TABLET ORAL
Qty: 28 | Refills: 0
Start: 2023-05-22 | End: 2023-06-04

## 2023-05-22 RX ADMIN — SODIUM CHLORIDE 1000 MILLILITER(S): 9 INJECTION INTRAMUSCULAR; INTRAVENOUS; SUBCUTANEOUS at 04:39

## 2023-05-22 RX ADMIN — Medication 400 MILLIGRAM(S): at 05:11

## 2023-05-22 RX ADMIN — SERTRALINE 50 MILLIGRAM(S): 25 TABLET, FILM COATED ORAL at 10:41

## 2023-05-22 RX ADMIN — HYDROMORPHONE HYDROCHLORIDE 1 MILLIGRAM(S): 2 INJECTION INTRAMUSCULAR; INTRAVENOUS; SUBCUTANEOUS at 04:39

## 2023-05-22 RX ADMIN — SODIUM CHLORIDE 125 MILLILITER(S): 9 INJECTION INTRAMUSCULAR; INTRAVENOUS; SUBCUTANEOUS at 10:48

## 2023-05-22 RX ADMIN — HYDROMORPHONE HYDROCHLORIDE 1 MILLIGRAM(S): 2 INJECTION INTRAMUSCULAR; INTRAVENOUS; SUBCUTANEOUS at 05:47

## 2023-05-22 RX ADMIN — Medication 1 MILLIGRAM(S): at 23:14

## 2023-05-22 RX ADMIN — ACETAZOLAMIDE 500 MILLIGRAM(S): 250 TABLET ORAL at 10:42

## 2023-05-22 RX ADMIN — ONDANSETRON 4 MILLIGRAM(S): 8 TABLET, FILM COATED ORAL at 12:01

## 2023-05-22 RX ADMIN — SODIUM CHLORIDE 125 MILLILITER(S): 9 INJECTION INTRAMUSCULAR; INTRAVENOUS; SUBCUTANEOUS at 22:40

## 2023-05-22 RX ADMIN — ONDANSETRON 4 MILLIGRAM(S): 8 TABLET, FILM COATED ORAL at 04:38

## 2023-05-22 RX ADMIN — Medication 1 TABLET(S): at 10:41

## 2023-05-22 RX ADMIN — ALBUTEROL 2 PUFF(S): 90 AEROSOL, METERED ORAL at 10:44

## 2023-05-22 RX ADMIN — GABAPENTIN 300 MILLIGRAM(S): 400 CAPSULE ORAL at 10:41

## 2023-05-22 RX ADMIN — Medication 100 MILLIGRAM(S): at 04:38

## 2023-05-22 RX ADMIN — Medication 975 MILLIGRAM(S): at 15:29

## 2023-05-22 RX ADMIN — HYDROMORPHONE HYDROCHLORIDE 0.5 MILLIGRAM(S): 2 INJECTION INTRAMUSCULAR; INTRAVENOUS; SUBCUTANEOUS at 22:42

## 2023-05-22 RX ADMIN — OXYCODONE HYDROCHLORIDE 10 MILLIGRAM(S): 5 TABLET ORAL at 10:47

## 2023-05-22 RX ADMIN — OXYCODONE HYDROCHLORIDE 10 MILLIGRAM(S): 5 TABLET ORAL at 19:51

## 2023-05-22 RX ADMIN — HYDROMORPHONE HYDROCHLORIDE 1 MILLIGRAM(S): 2 INJECTION INTRAMUSCULAR; INTRAVENOUS; SUBCUTANEOUS at 04:53

## 2023-05-22 RX ADMIN — ONDANSETRON 4 MILLIGRAM(S): 8 TABLET, FILM COATED ORAL at 05:11

## 2023-05-22 RX ADMIN — HYDROMORPHONE HYDROCHLORIDE 0.5 MILLIGRAM(S): 2 INJECTION INTRAMUSCULAR; INTRAVENOUS; SUBCUTANEOUS at 22:55

## 2023-05-22 RX ADMIN — ALBUTEROL 2 PUFF(S): 90 AEROSOL, METERED ORAL at 14:56

## 2023-05-22 NOTE — CONSULT NOTE ADULT - SUBJECTIVE AND OBJECTIVE BOX
27y Male presents to  s/p  with R ankle pain/rafal equivalent fracture. He states he was walking around a closed bar when he slipped and fell and had immediate R ankle pain and inabbility to ambulate. Denies numbess or tingling RLE. Denies any other acute orthopedic complaints. Denies past orthopedic hx. Endorses pmh of asthma and depression.       PAST MEDICAL & SURGICAL HISTORY:  Asthma  history of ICU admission for Exacerbation in 2017), no history of intubations      Eczema      Sleep apnea  resolved, s/p Mandibular advancement with angioplasty (7/2016)      Mitral valve disorder  cleft in mitral valve - congenital, asymptomatic      Atopic dermatitis      Deviated nasal septum      Asthma      Eczema      GERD (gastroesophageal reflux disease)      Esophageal diverticulum      RAD (reactive airway disease)      SETH (obstructive sleep apnea)  Mandibular advancement with angioplasty 7/2016      H/O rhinoplasty          Allergies: grass, trees, cats, dogs, horses, ragweed (Other)  dust (Hives)  No Known Drug Allergies                 15.0   11.37 )-----------( 320      ( 22 May 2023 04:31 )             44.9     05-22    142  |  114<H>  |  10  ----------------------------<  122<H>  3.7   |  21<L>  |  1.15    Ca    8.1<L>      22 May 2023 04:31    TPro  7.7  /  Alb  3.7  /  TBili  0.2  /  DBili  x   /  AST  41<H>  /  ALT  83<H>  /  AlkPhos  67  05-22    PT/INR - ( 22 May 2023 04:31 )   PT: 11.3 sec;   INR: 0.97 ratio         PTT - ( 22 May 2023 04:31 )  PTT:29.6 sec      Vital Signs Last 24 Hrs  T(C): 37.1 (22 May 2023 03:46), Max: 37.1 (22 May 2023 03:46)  T(F): 98.7 (22 May 2023 03:46), Max: 98.7 (22 May 2023 03:46)  HR: 87 (22 May 2023 03:46) (87 - 87)  BP: 128/69 (22 May 2023 03:46) (128/69 - 128/69)  BP(mean): --  RR: 20 (22 May 2023 03:46) (20 - 20)  SpO2: 97% (22 May 2023 03:46) (97% - 97%)    Parameters below as of 22 May 2023 03:46  Patient On (Oxygen Delivery Method): room air        Physical Exam:  General: NAD, Alert, Awake and oriented  Musculoskeletal:      R LE: No open skin.   Visible deformity of ankle  No deformities  or other signs of trauma at hip, thigh, knee, leg, or foot/toes.    Full baseline painless ROM at hip, knee, toes  ROM of ankle limited due to pain  Negative log roll.   Able to actively SLR.   SILT toes dp/sp/saph/sural  TTP at the ankle  No bony TTP to hip, knee or foot/calc  + DP/PT pulses palpable with brisk cap refill distally.   Compartments soft and compressible of thigh and lower leg.     Secondary Assessment:  NC/AT, NTTP of clavicles, NTTP of C-,T-,L-Spine, NTTP of Pelvis  UEs: NTTP of Shoulders, Elbows, Wrists, Hands; NT with AROM/PROM of Shoulders, Elbows, Wrists, Hands; AIN/PIN/Med/Uln/Msc/Rad/Ax intact  LLE Able to SLR, NT with Log Roll, NT with Heel Strike, NTTP of Hip, Knee, Ankle, Foot; NT with AROM/PROM of Hip, Knee, Ankle, foot; Q/H/Gsc/TA/EHL/FHL intact    Imaging:  XR of the right ankle demonstrating rafal equivalent with oblique midshaft fibular fracture, and medial clear space widening of the tibia       Orthopedic A/P:    Pt is a  27y Male with R rafal equivalent fracture       -Will Need ORIF today versus outpatient please keep NPO  -Analgesia  -NPO for possible ORIF   -DVT PE ppx, SCDs, hold DVT chemoppx before OR  -F/U Additonal imaging  -N/V Checks to monitor for compartment signs  -will discuss with dr fine and update with plan

## 2023-05-22 NOTE — ED ADULT NURSE NOTE - CHIEF COMPLAINT QUOTE
Patient is a 27y old male, alert and oriented X3, BIBEMS with c/o right ankle injury. As per EMS "patient was out drinking all night, approximately 10 drinks. patient broke into [employer] bar ended up in the kitchen, slipped on the wet floor inuring his right ankle and bleaching his hands. He managed to get himself out of the kitchen and propped up on a chair, sitting there for approximately 45mintues prior to calling us."   Patient reports "I was drinking early at home about 6 twisted teas. Then I went out to the bar in [place] everything was going well. Then I was going around to go to another bar. I ended up crawling through a window at [employer] and slipped on the floor."  Patient denies numbness or tingling in extremity, head strike, LOC, anticoagulation use.

## 2023-05-22 NOTE — ED ADULT TRIAGE NOTE - CHIEF COMPLAINT QUOTE
Patient is a 27y old male, alert and oriented X3, BIBEMS with c/o right ankle injury. As per EMS "patient was out drinking all night, approximately 10 drinks. patient broke into [employer] bar ended up in the kitchen, slipped on the wet floor inuring his right ankle and bleaching his hands. He managed to get himself out of the kitchen and propped up on a chair, sitting there for approximately 45mintues prior to calling us."   Patient reports "I was drinking early at home about 6 twisted teas. Then I went out to the bar in [place] everything was going well. Then I was going around to go to another bar. I ended up crawling through a window at [employer] and slipped on the floor."  Patient denies numbness or tingling in extremity, head strike, LOC, anticoagulation use.

## 2023-05-22 NOTE — ED ADULT NURSE REASSESSMENT NOTE - NS ED NURSE REASSESS COMMENT FT1
Called ortho resident regarding NPO diet and medications orders. As per Ortho Resident, Pt is able to have medications with sips of water.

## 2023-05-22 NOTE — ED PROVIDER NOTE - CLINICAL SUMMARY MEDICAL DECISION MAKING FREE TEXT BOX
Complex displaced ankle fracture: Xrays of knee; tib/fib and ankle planned.  IV pain control, labs and ortho consult.  Patient will likely need surgical repair.

## 2023-05-22 NOTE — ED PROVIDER NOTE - PROGRESS NOTE DETAILS
Discussed case with ortho. Ortho resident coming to see patient. Attending Burnham, ortho eval and pt can be admitted to Dr. Borja service

## 2023-05-22 NOTE — DISCHARGE NOTE PROVIDER - HOSPITAL COURSE
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     Hospital Course:     The patient is a 27y Male status post above. Pt sustained injury from a fall and was admitted through the  ED to Orthopedics service. Prior to OR patient was medically optimized and cleared for surgery. Prophylactic antibiotics were started within 30 minutes before the procedure and continued for 24 hours.  There were no complications during the procedure.  The patient was transferred to recovery room in stable condition and subsequently to the orthopedic floor.  Patient was placed on anticoagulation for DVT/PE prophylaxis per the Anticoagulation Team.  All home medications were continued.  Physical therapy daily and daily labs were followed. The Splint was kept clean, dry, intact. The rest of the hospital stay was unremarkable. Pt received PT daily and was Discharged once cleared per Medicine and PT.  The orthopedic Attending is aware and agrees.

## 2023-05-22 NOTE — DISCHARGE NOTE PROVIDER - NSDCFUADDINST_GEN_ALL_CORE_FT
Discharge Instructions for ORIF Right Ankle:    1. Pain Control.  2. Non-Weight Bearing Right Lower Extremity, with assistive device/rolling walker.  3. Elevate and ICE the extremity as much as possible  4. Keep bandage/Splint Clean and dry. Do not get it wet. Do not walk or put any body weight on splint because it will break.  5. Continue DVT/PE Prophylaxis as recommended by the Anticoagulation Team. See Med Rec.   6. Out of Bed Daily, try to keep moving.  7. Dressing changed will be provided by Orthopedic Surgeon.  8. MD will need to Remove staples/sutures in office POD14 (6/5/23).  9. Follow up with Orthopedic Surgeon Dr. Borja in 10-14 Days. Call Office For Appointment.

## 2023-05-22 NOTE — ED PROVIDER NOTE - OBJECTIVE STATEMENT
Pt. is a 28 yo M with hx of asthma, GERD, SETH, deviated nasal septum, eczema, mitral ann-marie cleft presents with right ankle pain s/p slip and fall in a kitchen of a restaurant/bar.  Patient states he drank alcohol last night and was walking home and broke into a kitchen window into a bar and accidentally slipped and fell.  Patient fell directly on right leg, heard a crack, and then had severe pain and could not move or walk.  EMS applied splint and states right ankle is deformed. Patient denies numbness.  No head injury or LOC.  No other injury.

## 2023-05-22 NOTE — ED ADULT NURSE REASSESSMENT NOTE - NS ED NURSE REASSESS COMMENT FT1
assume care from zechariah taylor. VSS, pt resting, meyers draining BRBB no c/o pain at this moment. pt continues on cardiac monitor. assume care from zechariah rn. VSS, pt resting, surgery at bedside. plan is to go to OR this am. family at bedside.  JORGE.

## 2023-05-22 NOTE — H&P ADULT - ATTENDING COMMENTS
Pt seen and examined. H&P reviewed. Relevant imaging reviewed. Right middle third fibula fx with posterior mall avulsion fx. Surgical indications reviewed. All RBA discussed. Plan to proceed with surgery.

## 2023-05-22 NOTE — DISCHARGE NOTE PROVIDER - NSDCMRMEDTOKEN_GEN_ALL_CORE_FT
acetaZOLAMIDE 500 mg oral capsule, extended release: 1 cap(s) orally once a day  Albuterol (Eqv-Ventolin HFA) 90 mcg/inh inhalation aerosol: 2 puff(s) inhaled every 4 hours as needed for  shortness of breath and/or wheezing  Augmentin 875 mg-125 mg oral tablet: 1 tab(s) orally 2 times a day x 10 days ***Course Complete***  Fasenra Pen 30 mg/mL subcutaneous solution: 30 milligram(s) subcutaneously once a month Per Primary Care Provider  gabapentin 300 mg oral capsule: 1 cap(s) orally once a day  MiraLax oral powder for reconstitution: 17 gram(s) orally once a day as needed for  constipation  ondansetron 4 mg oral tablet: 1 tab(s) orally every 8 hours as needed for  nausea  Opzelura 1.5% topical cream: Apply topically to affected area once daily as needed  oxyCODONE 5 mg oral tablet: 1 tab(s) orally every 4 hours as needed for  severe pain MDD: 6  Protonix 40 mg oral delayed release tablet: 1 tab(s) orally once a day  Protonix 40 mg oral delayed release tablet: 1 tab(s) orally once a day  Rinvoq 30 mg oral tablet, extended release: 1 tab(s) orally once a day  Senna 8.6 mg oral tablet: 2 tab(s) orally once a day (at bedtime) as needed for  constipation  sertraline 50 mg oral tablet: 1.5 tab(s) orally once a day (in the morning)  Symbicort 160 mcg-4.5 mcg/inh inhalation aerosol: 2 puff(s) inhaled 2 times a day  Tylenol Extra Strength 500 mg oral tablet: 2 tab(s) orally every 8 hours   acetaZOLAMIDE 500 mg oral capsule, extended release: 1 cap(s) orally once a day  Albuterol (Eqv-Ventolin HFA) 90 mcg/inh inhalation aerosol: 2 puff(s) inhaled every 4 hours as needed for  shortness of breath and/or wheezing  enoxaparin 40 mg/0.4 mL injectable solution: 40 milligram(s) subcutaneously once a day  Fasenra Pen 30 mg/mL subcutaneous solution: 30 milligram(s) subcutaneously once a month Per Primary Care Provider  gabapentin 300 mg oral capsule: 1 cap(s) orally once a day  MiraLax oral powder for reconstitution: 17 gram(s) orally once a day as needed for  constipation  ondansetron 4 mg oral tablet: 1 tab(s) orally every 8 hours as needed for  nausea  Opzelura 1.5% topical cream: Apply topically to affected area once daily as needed  oxyCODONE 5 mg oral tablet: 1 tab(s) orally every 4 hours as needed for  severe pain MDD: 6  Protonix 40 mg oral delayed release tablet: 1 tab(s) orally once a day  Protonix 40 mg oral delayed release tablet: 1 tab(s) orally once a day  Rinvoq 30 mg oral tablet, extended release: 1 tab(s) orally once a day  Senna 8.6 mg oral tablet: 2 tab(s) orally once a day (at bedtime) as needed for  constipation  sertraline 50 mg oral tablet: 1.5 tab(s) orally once a day (in the morning)  Symbicort 160 mcg-4.5 mcg/inh inhalation aerosol: 2 puff(s) inhaled 2 times a day  Tylenol Extra Strength 500 mg oral tablet: 2 tab(s) orally every 8 hours

## 2023-05-22 NOTE — DISCHARGE NOTE PROVIDER - CARE PROVIDER_API CALL
Keyur Borja (DO)  Orthopaedic Surgery  [address] [place] [address]  Phone: [phone]  Fax: [phone]  Follow Up Time:

## 2023-05-22 NOTE — ED ADULT NURSE NOTE - OBJECTIVE STATEMENT
Patient is a 27y old male, alert and oriented X3, BIBEMS with c/o right ankle injury. As per EMS "patient was out drinking all night, approximately 10 drinks. patient broke into [employer] bar ended up in the kitchen, slipped on the wet floor inuring his right ankle and bleaching his hands. Pt states he was at [employer], and some how ended up at [facility] bc he saw the open door. no s/s of distress.

## 2023-05-22 NOTE — DISCHARGE NOTE PROVIDER - NSDCCPTREATMENT_GEN_ALL_CORE_FT
PRINCIPAL PROCEDURE  Procedure: Open reduction and internal fixation of bimalleolar fracture of right ankle  Findings and Treatment:

## 2023-05-22 NOTE — ED PROVIDER NOTE - MUSCULOSKELETAL, MLM
Spine appears normal, range of motion is decreased in right lower leg and ankle.  +normal right pedal pulse.  Normal sensation.  +deformity and tenderness of right ankle.

## 2023-05-22 NOTE — CONSULT NOTE ADULT - SUBJECTIVE AND OBJECTIVE BOX
C/c: RLE pain after a fall    HPI: 27M, pmh of obesity, SETH s/p maxomandibular advancement, Eczema, mitral valve prolapse, Intracranial HTN which presented as visual disturbance, on acetazolamide (follows with neuropthalmologist), Asthma recently started on Fasenra by his allergiest, has never required intubation, but did require icu admission in 2017,  last had an exacerbation in early april with rhinovirus, last used his rescue inhaler back then, recently treated with augmentin for strep pharyngitis, who presented to the ER with right ankle deformity after a fall. He was drinking, slipped and fell while at a bar. He was brought into the ER and admitted to ortho with a right ankle fracture.   Scheduled for OR later today.   Denies loc/head injury. No sob/chest pains recently. Last used his rescue inhaler during his asthma exacerbation in april (more than a month ago). Doesn't currently follow with pulmonologist but follows with an allergist.   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   No n/v/d/abd pain. No easy bleeding tendencies. no prior reactions with anesthesia.     ROS: all 10 systems reviewed and is as above otherwise negative.     PMH: as above  PSH: maxomandibular advancement, rhinoplasty.   F/H: adopted  Social: no tobacco, drinks etoh socially, occasional Marijuana use.   Allergies: NKDA  Home meds: see med rec    Vital Signs Last 24 Hrs  T(C): 36.8 (22 May 2023 11:19), Max: 37.1 (22 May 2023 03:46)  T(F): 98.3 (22 May 2023 11:19), Max: 98.7 (22 May 2023 03:46)  HR: 81 (22 May 2023 11:19) (69 - 87)  BP: 108/69 (22 May 2023 11:19) (108/69 - 128/69)  BP(mean): 77 (22 May 2023 11:19) (77 - 84)  RR: 18 (22 May 2023 11:19) (18 - 20)  SpO2: 96% (22 May 2023 11:19) (96% - 99%)    Parameters below as of 22 May 2023 11:19  Patient On (Oxygen Delivery Method): room air    T(C): 36.8 (05-22-23 @ 11:19), Max: 37.1 (05-22-23 @ 03:46)  HR: 81 (05-22-23 @ 11:19) (69 - 87)  BP: 108/69 (05-22-23 @ 11:19) (108/69 - 128/69)  RR: 18 (05-22-23 @ 11:19) (18 - 20)  SpO2: 96% (05-22-23 @ 11:19) (96% - 99%)    PHYSICAL EXAM:    GENERAL: Comfortable, overweight, pleasant, no acute distress   HEAD:  Normocephalic, atraumatic  EYES: EOMI, PERRLA  HEENT: Moist mucous membranes  NECK: Supple, No JVD  NERVOUS SYSTEM:  Alert & Oriented X3, grossly non focal.   CHEST/LUNG: Clear to auscultation bilaterally  HEART: S1, S2+, Regular rate and rhythm, systolic murmur best heard @ apex  ABDOMEN: Soft, Nontender, Nondistended, Bowel sounds present  GENITOURINARY: Voiding, no palpable bladder  EXTREMITIES:   No clubbing, cyanosis, or edema  MUSCULOSKELETAL- RLE in splint.  SKIN-no rash    LABS:                        13.9   16.79 )-----------( 301      ( 22 May 2023 07:57 )             41.7     05-22    143  |  115<H>  |  10  ----------------------------<  122<H>  3.9   |  21<L>  |  1.14    Ca    7.7<L>      22 May 2023 07:57    TPro  7.7  /  Alb  3.7  /  TBili  0.2  /  DBili  x   /  AST  41<H>  /  ALT  83<H>  /  AlkPhos  67  05-22    PT/INR - ( 22 May 2023 04:31 )   PT: 11.3 sec;   INR: 0.97 ratio         PTT - ( 22 May 2023 04:31 )  PTT:29.6 sec     Xray Ankle 2 Views, Right (05.22.23 @ 08:00) >  IMPRESSION: Displaced right mid fibular fracture.  Tibiotalar dislocation improved on splinted films.  Possible small posterior malleolar fracture component.       Xray Chest 1 View- PORTABLE-Urgent (Xray Chest 1 View- PORTABLE-Urgent .) (05.22.23 @ 08:34) >  AP chest on May 12, 2020 3:23 AM.  2 images.  Heart size normal.  Lungs are clear.  Chest is similar to April 12 this year.    EKG: sinus, no acute ischemic changes.     MEDICATIONS  (STANDING):  acetaminophen     Tablet .. 975 milliGRAM(s) Oral every 8 hours  acetaZOLAMIDE   ER Capsule 500 milliGRAM(s) Oral daily  albuterol    90 MICROgram(s) HFA Inhaler 2 Puff(s) Inhalation every 6 hours  gabapentin 300 milliGRAM(s) Oral two times a day  multivitamin 1 Tablet(s) Oral daily  polyethylene glycol 3350 17 Gram(s) Oral at bedtime  senna 2 Tablet(s) Oral at bedtime  sertraline 50 milliGRAM(s) Oral daily  sodium chloride 0.9%. 1000 milliLiter(s) (125 mL/Hr) IV Continuous <Continuous>  Upadacitinib ( Rinvoq ) 30 milliGRAM(s)   Oral daily    ASSESSMENT AND PLAN:    1. Mechanical fall/Right ankle fracture:  -For OR today.   -Asthma seems to be well controlled based on description of patient's symptoms and current physical exam  -on symbicort/fasenra for treatment of asthma with prn albuterol which he has not required in the past four weeks.   -No medical contraindications for OR at this time.   -pain control  -physical therapy eval post op  -incentive spirometry  -bowel regimen.     2. Asthma:  -symptoms controlled with fasenra.   -continue symbicort.   -prn albuterol.     3. Idiopathic Intrancranial HTN:  -continue acetazolamide.     4. Anxiety  -continue sertraline.     5. Eczema:  -on rinvoq outpt.     6. DVT px  -start post op    thanks, will follow.                              C/c: RLE pain after a fall    HPI: 27M, pmh of obesity, SETH s/p maxomandibular advancement, Eczema, mitral valve prolapse, Intracranial HTN which presented as visual disturbance, on acetazolamide (follows with neuropthalmologist), Asthma recently started on Fasenra by his allergiest, has never required intubation, but did require icu admission in 2017,  last had an exacerbation in early april with rhinovirus, last used his rescue inhaler back then, recently treated with augmentin for strep pharyngitis, who presented to the ER with right ankle deformity after a fall. He was drinking, slipped and fell while at a bar. He was brought into the ER and admitted to ortho with a right ankle fracture.   Scheduled for OR later today.   Denies loc/head injury. No sob/chest pains recently. Last used his rescue inhaler during his asthma exacerbation in april (more than a month ago). Doesn't currently follow with pulmonologist but follows with an allergist.   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   No n/v/d/abd pain. No easy bleeding tendencies. no prior reactions with anesthesia.     ROS: all 10 systems reviewed and is as above otherwise negative.     PMH: as above  PSH: maxomandibular advancement, rhinoplasty.   F/H: adopted  Social: no tobacco, drinks etoh socially, occasional Marijuana use.   Allergies: NKDA  Home meds: see med rec    Vital Signs Last 24 Hrs  T(C): 36.8 (22 May 2023 11:19), Max: 37.1 (22 May 2023 03:46)  T(F): 98.3 (22 May 2023 11:19), Max: 98.7 (22 May 2023 03:46)  HR: 81 (22 May 2023 11:19) (69 - 87)  BP: 108/69 (22 May 2023 11:19) (108/69 - 128/69)  BP(mean): 77 (22 May 2023 11:19) (77 - 84)  RR: 18 (22 May 2023 11:19) (18 - 20)  SpO2: 96% (22 May 2023 11:19) (96% - 99%)    Parameters below as of 22 May 2023 11:19  Patient On (Oxygen Delivery Method): room air    T(C): 36.8 (05-22-23 @ 11:19), Max: 37.1 (05-22-23 @ 03:46)  HR: 81 (05-22-23 @ 11:19) (69 - 87)  BP: 108/69 (05-22-23 @ 11:19) (108/69 - 128/69)  RR: 18 (05-22-23 @ 11:19) (18 - 20)  SpO2: 96% (05-22-23 @ 11:19) (96% - 99%)    PHYSICAL EXAM:    GENERAL: Comfortable, overweight, pleasant, no acute distress   HEAD:  Normocephalic, atraumatic  EYES: EOMI, PERRLA  HEENT: Moist mucous membranes  NECK: Supple, No JVD  NERVOUS SYSTEM:  Alert & Oriented X3, grossly non focal.   CHEST/LUNG: Clear to auscultation bilaterally  HEART: S1, S2+, Regular rate and rhythm, systolic murmur best heard @ apex  ABDOMEN: Soft, Nontender, Nondistended, Bowel sounds present  GENITOURINARY: Voiding, no palpable bladder  EXTREMITIES:   No clubbing, cyanosis, or edema  MUSCULOSKELETAL- RLE in splint.  SKIN-no rash    LABS:                        13.9   16.79 )-----------( 301      ( 22 May 2023 07:57 )             41.7     05-22    143  |  115<H>  |  10  ----------------------------<  122<H>  3.9   |  21<L>  |  1.14    Ca    7.7<L>      22 May 2023 07:57    TPro  7.7  /  Alb  3.7  /  TBili  0.2  /  DBili  x   /  AST  41<H>  /  ALT  83<H>  /  AlkPhos  67  05-22    PT/INR - ( 22 May 2023 04:31 )   PT: 11.3 sec;   INR: 0.97 ratio         PTT - ( 22 May 2023 04:31 )  PTT:29.6 sec     Xray Ankle 2 Views, Right (05.22.23 @ 08:00) >  IMPRESSION: Displaced right mid fibular fracture.  Tibiotalar dislocation improved on splinted films.  Possible small posterior malleolar fracture component.       Xray Chest 1 View- PORTABLE-Urgent (Xray Chest 1 View- PORTABLE-Urgent .) (05.22.23 @ 08:34) >  AP chest on May 12, 2020 3:23 AM.  2 images.  Heart size normal.  Lungs are clear.  Chest is similar to April 12 this year.    EKG: sinus, no acute ischemic changes.     MEDICATIONS  (STANDING):  acetaminophen     Tablet .. 975 milliGRAM(s) Oral every 8 hours  acetaZOLAMIDE   ER Capsule 500 milliGRAM(s) Oral daily  albuterol    90 MICROgram(s) HFA Inhaler 2 Puff(s) Inhalation every 6 hours  gabapentin 300 milliGRAM(s) Oral two times a day  multivitamin 1 Tablet(s) Oral daily  polyethylene glycol 3350 17 Gram(s) Oral at bedtime  senna 2 Tablet(s) Oral at bedtime  sertraline 50 milliGRAM(s) Oral daily  sodium chloride 0.9%. 1000 milliLiter(s) (125 mL/Hr) IV Continuous <Continuous>  Upadacitinib ( Rinvoq ) 30 milliGRAM(s)   Oral daily    ASSESSMENT AND PLAN:    1. Mechanical fall/Right ankle fracture:  -For OR today.   -Asthma seems to be well controlled based on description of patient's symptoms and current physical exam  -on symbicort/fasenra for treatment of asthma with prn albuterol which he has not required in the past four weeks.   -No medical contraindications for OR at this time.   -pain control  -physical therapy eval post op  -incentive spirometry  -bowel regimen.     2. Asthma:  -symptoms controlled with fasenra.   -continue symbicort.   -prn albuterol.     3. Idiopathic Intrancranial HTN:  -continue acetazolamide.     4. Anxiety  -continue sertraline.     5. Eczema:  -on rinvoq outpt-->hold periop    6. DVT px  -start post op    thanks, will follow.

## 2023-05-22 NOTE — ED ADULT NURSE NOTE - NSFALLRISKINTERV_ED_ALL_ED
Assistance OOB with selected safe patient handling equipment if applicable/Assistance with ambulation/Communicate fall risk and risk factors to all staff, patient, and family/Monitor gait and stability/Monitor for mental status changes and reorient to person, place, and time, as needed/Provide patient with walking aids/Provide visual cue: yellow wristband, yellow gown, etc/Reinforce activity limits and safety measures with patient and family/Toileting schedule using arm’s reach rule for commode and bathroom/Use of alarms - bed, stretcher, chair and/or video monitoring/Call bell, personal items and telephone in reach/Instruct patient to call for assistance before getting out of bed/chair/stretcher/Non-slip footwear applied when patient is off stretcher/[place] to call system/Physically safe environment - no spills, clutter or unnecessary equipment/Purposeful Proactive Rounding/Room/bathroom lighting operational, light cord in reach

## 2023-05-23 LAB
ANION GAP SERPL CALC-SCNC: 4 MMOL/L — LOW (ref 5–17)
BUN SERPL-MCNC: 13 MG/DL — SIGNIFICANT CHANGE UP (ref 7–23)
CALCIUM SERPL-MCNC: 7.5 MG/DL — LOW (ref 8.5–10.1)
CHLORIDE SERPL-SCNC: 114 MMOL/L — HIGH (ref 96–108)
CO2 SERPL-SCNC: 23 MMOL/L — SIGNIFICANT CHANGE UP (ref 22–31)
CREAT SERPL-MCNC: 1.25 MG/DL — SIGNIFICANT CHANGE UP (ref 0.5–1.3)
EGFR: 80 ML/MIN/1.73M2 — SIGNIFICANT CHANGE UP
GLUCOSE SERPL-MCNC: 118 MG/DL — HIGH (ref 70–99)
HCT VFR BLD CALC: 37.2 % — LOW (ref 39–50)
HGB BLD-MCNC: 12.1 G/DL — LOW (ref 13–17)
MCHC RBC-ENTMCNC: 31.4 PG — SIGNIFICANT CHANGE UP (ref 27–34)
MCHC RBC-ENTMCNC: 32.5 GM/DL — SIGNIFICANT CHANGE UP (ref 32–36)
MCV RBC AUTO: 96.6 FL — SIGNIFICANT CHANGE UP (ref 80–100)
PLATELET # BLD AUTO: 244 K/UL — SIGNIFICANT CHANGE UP (ref 150–400)
POTASSIUM SERPL-MCNC: 3.6 MMOL/L — SIGNIFICANT CHANGE UP (ref 3.5–5.3)
POTASSIUM SERPL-SCNC: 3.6 MMOL/L — SIGNIFICANT CHANGE UP (ref 3.5–5.3)
RBC # BLD: 3.85 M/UL — LOW (ref 4.2–5.8)
RBC # FLD: 14.6 % — HIGH (ref 10.3–14.5)
SODIUM SERPL-SCNC: 141 MMOL/L — SIGNIFICANT CHANGE UP (ref 135–145)
WBC # BLD: 16.84 K/UL — HIGH (ref 3.8–10.5)
WBC # FLD AUTO: 16.84 K/UL — HIGH (ref 3.8–10.5)

## 2023-05-23 PROCEDURE — 93010 ELECTROCARDIOGRAM REPORT: CPT

## 2023-05-23 PROCEDURE — 99232 SBSQ HOSP IP/OBS MODERATE 35: CPT

## 2023-05-23 RX ORDER — HYDROMORPHONE HYDROCHLORIDE 2 MG/ML
0.5 INJECTION INTRAMUSCULAR; INTRAVENOUS; SUBCUTANEOUS ONCE
Refills: 0 | Status: DISCONTINUED | OUTPATIENT
Start: 2023-05-23 | End: 2023-05-23

## 2023-05-23 RX ORDER — ENOXAPARIN SODIUM 100 MG/ML
40 INJECTION SUBCUTANEOUS EVERY 24 HOURS
Refills: 0 | Status: DISCONTINUED | OUTPATIENT
Start: 2023-05-23 | End: 2023-05-25

## 2023-05-23 RX ORDER — CEFAZOLIN SODIUM 1 G
2000 VIAL (EA) INJECTION EVERY 8 HOURS
Refills: 0 | Status: COMPLETED | OUTPATIENT
Start: 2023-05-23 | End: 2023-05-23

## 2023-05-23 RX ORDER — OXYCODONE HYDROCHLORIDE 5 MG/1
10 TABLET ORAL EVERY 4 HOURS
Refills: 0 | Status: DISCONTINUED | OUTPATIENT
Start: 2023-05-23 | End: 2023-05-25

## 2023-05-23 RX ORDER — ENOXAPARIN SODIUM 100 MG/ML
40 INJECTION SUBCUTANEOUS
Qty: 30 | Refills: 0
Start: 2023-05-23 | End: 2023-06-21

## 2023-05-23 RX ORDER — ACETAMINOPHEN 500 MG
1000 TABLET ORAL ONCE
Refills: 0 | Status: COMPLETED | OUTPATIENT
Start: 2023-05-23 | End: 2023-05-23

## 2023-05-23 RX ORDER — ASPIRIN/CALCIUM CARB/MAGNESIUM 324 MG
81 TABLET ORAL
Refills: 0 | Status: DISCONTINUED | OUTPATIENT
Start: 2023-05-23 | End: 2023-05-23

## 2023-05-23 RX ADMIN — HYDROMORPHONE HYDROCHLORIDE 0.5 MILLIGRAM(S): 2 INJECTION INTRAMUSCULAR; INTRAVENOUS; SUBCUTANEOUS at 20:51

## 2023-05-23 RX ADMIN — HYDROMORPHONE HYDROCHLORIDE 0.5 MILLIGRAM(S): 2 INJECTION INTRAMUSCULAR; INTRAVENOUS; SUBCUTANEOUS at 10:08

## 2023-05-23 RX ADMIN — Medication 100 MILLIGRAM(S): at 05:51

## 2023-05-23 RX ADMIN — SODIUM CHLORIDE 125 MILLILITER(S): 9 INJECTION INTRAMUSCULAR; INTRAVENOUS; SUBCUTANEOUS at 09:53

## 2023-05-23 RX ADMIN — Medication 1000 MILLIGRAM(S): at 00:40

## 2023-05-23 RX ADMIN — SERTRALINE 50 MILLIGRAM(S): 25 TABLET, FILM COATED ORAL at 09:54

## 2023-05-23 RX ADMIN — SODIUM CHLORIDE 125 MILLILITER(S): 9 INJECTION INTRAMUSCULAR; INTRAVENOUS; SUBCUTANEOUS at 00:08

## 2023-05-23 RX ADMIN — OXYCODONE HYDROCHLORIDE 10 MILLIGRAM(S): 5 TABLET ORAL at 16:59

## 2023-05-23 RX ADMIN — Medication 1 TABLET(S): at 09:54

## 2023-05-23 RX ADMIN — HYDROMORPHONE HYDROCHLORIDE 0.5 MILLIGRAM(S): 2 INJECTION INTRAMUSCULAR; INTRAVENOUS; SUBCUTANEOUS at 20:11

## 2023-05-23 RX ADMIN — GABAPENTIN 300 MILLIGRAM(S): 400 CAPSULE ORAL at 09:54

## 2023-05-23 RX ADMIN — OXYCODONE HYDROCHLORIDE 10 MILLIGRAM(S): 5 TABLET ORAL at 12:00

## 2023-05-23 RX ADMIN — GABAPENTIN 300 MILLIGRAM(S): 400 CAPSULE ORAL at 21:28

## 2023-05-23 RX ADMIN — Medication 400 MILLIGRAM(S): at 05:51

## 2023-05-23 RX ADMIN — Medication 975 MILLIGRAM(S): at 13:12

## 2023-05-23 RX ADMIN — Medication 400 MILLIGRAM(S): at 00:08

## 2023-05-23 RX ADMIN — ENOXAPARIN SODIUM 40 MILLIGRAM(S): 100 INJECTION SUBCUTANEOUS at 13:08

## 2023-05-23 RX ADMIN — GABAPENTIN 300 MILLIGRAM(S): 400 CAPSULE ORAL at 00:09

## 2023-05-23 RX ADMIN — ACETAZOLAMIDE 500 MILLIGRAM(S): 250 TABLET ORAL at 09:54

## 2023-05-23 RX ADMIN — Medication 975 MILLIGRAM(S): at 13:08

## 2023-05-23 RX ADMIN — OXYCODONE HYDROCHLORIDE 5 MILLIGRAM(S): 5 TABLET ORAL at 08:25

## 2023-05-23 RX ADMIN — Medication 81 MILLIGRAM(S): at 09:53

## 2023-05-23 RX ADMIN — OXYCODONE HYDROCHLORIDE 5 MILLIGRAM(S): 5 TABLET ORAL at 07:36

## 2023-05-23 RX ADMIN — OXYCODONE HYDROCHLORIDE 10 MILLIGRAM(S): 5 TABLET ORAL at 11:10

## 2023-05-23 RX ADMIN — OXYCODONE HYDROCHLORIDE 10 MILLIGRAM(S): 5 TABLET ORAL at 17:50

## 2023-05-23 RX ADMIN — Medication 975 MILLIGRAM(S): at 21:32

## 2023-05-23 RX ADMIN — HYDROMORPHONE HYDROCHLORIDE 0.5 MILLIGRAM(S): 2 INJECTION INTRAMUSCULAR; INTRAVENOUS; SUBCUTANEOUS at 09:54

## 2023-05-23 RX ADMIN — Medication 975 MILLIGRAM(S): at 21:28

## 2023-05-23 RX ADMIN — Medication 100 MILLIGRAM(S): at 13:08

## 2023-05-23 RX ADMIN — Medication 1000 MILLIGRAM(S): at 06:00

## 2023-05-23 NOTE — PHYSICAL THERAPY INITIAL EVALUATION ADULT - LIVES WITH, PROFILE
Pt lives in basement apartment of parents home, enters direct through sub-level garage, no stairs./parents

## 2023-05-23 NOTE — PROGRESS NOTE ADULT - SUBJECTIVE AND OBJECTIVE BOX
C/c: RLE pain after a fall    HPI: 27M, pmh of obesity, SETH s/p maxomandibular advancement, Eczema, mitral valve prolapse, Intracranial HTN which presented as visual disturbance, on acetazolamide (follows with neuropthalmologist), Asthma recently started on Fasenra by his allergiest, has never required intubation, but did require icu admission in 2017,  last had an exacerbation in early april with rhinovirus, last used his rescue inhaler back then, recently treated with augmentin for strep pharyngitis, who presented to the ER with right ankle deformity after a fall. He was drinking, slipped and fell while at a bar. He was brought into the ER and admitted to ortho with a right ankle fracture.   Hospitalist service consulted for medical clearance/comanagement.   Underwent ORIF 5/22.    pt seen and examined this am. Doing well. having some pain at surgical site. no sob/chest pain. Was awaiting physical therapy. Tolerating po. no difficulty voiding.    ROS: all 10 systems reviewed and is as above otherwise negative.     Vital Signs Last 24 Hrs  T(C): 37.1 (23 May 2023 11:59), Max: 37.1 (22 May 2023 22:24)  T(F): 98.8 (23 May 2023 11:59), Max: 98.8 (23 May 2023 11:59)  HR: 107 (23 May 2023 11:59) (84 - 132)  BP: 112/63 (23 May 2023 11:59) (102/70 - 161/86)  BP(mean): 89 (22 May 2023 15:07) (89 - 89)  RR: 18 (23 May 2023 11:59) (12 - 23)  SpO2: 97% (23 May 2023 11:59) (95% - 100%)    Parameters below as of 23 May 2023 11:59  Patient On (Oxygen Delivery Method): room air      PHYSICAL EXAM:    GENERAL: Comfortable, overweight, pleasant, no acute distress   HEAD:  Normocephalic, atraumatic  EYES: EOMI, PERRLA  HEENT: Moist mucous membranes  NECK: Supple, No JVD  NERVOUS SYSTEM:  Alert & Oriented X3, grossly non focal.   CHEST/LUNG: Clear to auscultation bilaterally  HEART: S1, S2+, Regular rate and rhythm, systolic murmur best heard @ apex  ABDOMEN: Soft, Nontender, Nondistended, Bowel sounds present  GENITOURINARY: Voiding, no palpable bladder  EXTREMITIES:   No clubbing, cyanosis, or edema  MUSCULOSKELETAL- RLE in splint.  SKIN-no rash    LABS:                        12.1   16.84 )-----------( 244      ( 23 May 2023 05:41 )             37.2     05-23    141  |  114<H>  |  13  ----------------------------<  118<H>  3.6   |  23  |  1.25    Ca    7.5<L>      23 May 2023 05:41    TPro  7.7  /  Alb  3.7  /  TBili  0.2  /  DBili  x   /  AST  41<H>  /  ALT  83<H>  /  AlkPhos  67  05-22    PT/INR - ( 22 May 2023 04:31 )   PT: 11.3 sec;   INR: 0.97 ratio         PTT - ( 22 May 2023 04:31 )  PTT:29.6 sec           Xray Ankle 2 Views, Right (05.22.23 @ 08:00) >  IMPRESSION: Displaced right mid fibular fracture.  Tibiotalar dislocation improved on splinted films.  Possible small posterior malleolar fracture component.       Xray Chest 1 View- PORTABLE-Urgent (Xray Chest 1 View- PORTABLE-Urgent .) (05.22.23 @ 08:34) >  AP chest on May 12, 2020 3:23 AM.  2 images.  Heart size normal.  Lungs are clear.  Chest is similar to April 12 this year.    EKG: sinus, no acute ischemic changes.     MEDICATIONS  (STANDING):  acetaminophen     Tablet .. 975 milliGRAM(s) Oral every 8 hours  acetaZOLAMIDE   ER Capsule 500 milliGRAM(s) Oral daily  albuterol    90 MICROgram(s) HFA Inhaler 2 Puff(s) Inhalation every 6 hours  ceFAZolin   IVPB 2000 milliGRAM(s) IV Intermittent every 8 hours  enoxaparin Injectable 40 milliGRAM(s) SubCutaneous every 24 hours  gabapentin 300 milliGRAM(s) Oral two times a day  multivitamin 1 Tablet(s) Oral daily  polyethylene glycol 3350 17 Gram(s) Oral at bedtime  senna 2 Tablet(s) Oral at bedtime  sertraline 50 milliGRAM(s) Oral daily  sodium chloride 0.9%. 1000 milliLiter(s) (125 mL/Hr) IV Continuous <Continuous>    MEDICATIONS  (PRN):  albuterol    90 MICROgram(s) HFA Inhaler 1 Puff(s) Inhalation Once PRN Shortness of Breath and/or Wheezing  magnesium hydroxide Suspension 30 milliLiter(s) Oral daily PRN Constipation  ondansetron Injectable 4 milliGRAM(s) IV Push every 6 hours PRN Nausea and/or Vomiting  ondansetron Injectable 4 milliGRAM(s) IV Push every 8 hours PRN Nausea and/or Vomiting  oxyCODONE    IR 5 milliGRAM(s) Oral every 4 hours PRN Moderate Pain (4 - 6)  oxyCODONE    IR 10 milliGRAM(s) Oral every 4 hours PRN Severe Pain (7 - 10)    ASSESSMENT AND PLAN:    1. Mechanical fall/Right ankle fracture:  -s/p ORIF POD#1  -physical therapy eval post op  -incentive spirometry  -bowel regimen.    2. Acute blood loss anemia:  -d/t surgery  -no need for transfusion at this time.     3. Asthma:  -symptoms controlled with fasenra.   -continue symbicort.   -prn albuterol.     4. Idiopathic Intracranial HTN:  -continue acetazolamide.     5. Anxiety  -continue sertraline.     6. Eczema:  -on rinvoq outpt-->hold periop    7. DVT px  -lovenox

## 2023-05-23 NOTE — PHYSICAL THERAPY INITIAL EVALUATION ADULT - PERTINENT HX OF CURRENT PROBLEM, REHAB EVAL
27y Male presents to  s/p MF with R ankle pain/rafal equivalent fracture. He states he was walking around a closed bar when he slipped and fell and had immediate R ankle pain and inability to ambulate. Denies numbness or tingling RLE. Denies any other acute orthopedic complaints. Denies past orthopedic hx. Endorses pmh of asthma and depression.

## 2023-05-23 NOTE — PHYSICAL THERAPY INITIAL EVALUATION ADULT - GENERAL OBSERVATIONS, REHAB EVAL
Pt rec'd supine in bed, parents of pt at bedside, RLE elevated in splint cast, pt endorses moderate pain, cooperative with PT.

## 2023-05-23 NOTE — PROGRESS NOTE ADULT - SUBJECTIVE AND OBJECTIVE BOX
Patient seen and examined at bedside. Patient tolerated procedure well. Pain well controlled with medication. Patient denies any numbness, tingling, weakness, or any other orthopaedic complaint. Denies N/V/CP/SOB.         T(C): 36.9 (05-23-23 @ 01:56)  T(F): 98.4 (05-23-23 @ 01:56)  HR: 90 (05-23-23 @ 01:56)  BP: 102/70 (05-23-23 @ 01:56)  RR: 18 (05-23-23 @ 01:56)  SpO2: 95% (05-23-23 @ 01:56)  Wt(kg): --    Exam:  Alert and Oriented, No Acute Distress    Right Lower Extremity:  Splint CDI;                                                       +EHL/FHL  Sensation grossly intact  Digits WWP; Cap refill <2secs                                                                                            13.9   16.79 )-----------( 301      ( 22 May 2023 07:57 )             41.7        143  |  115<H>  |  10  ----------------------------<  122<H>  3.9   |  21<L>  |  1.14      A/P: 27y Male S/P R Ankle ORIF; Stable, POD1  -Pain Control; Ice prn; Elevate  -DVT PPx -A81, pending official AC team recs  -IS  -Am labs  -PT Eval: NWB RLE  -PERIOP antibiotics x24hr

## 2023-05-23 NOTE — PATIENT PROFILE ADULT - FALL HARM RISK - HARM RISK INTERVENTIONS

## 2023-05-23 NOTE — PATIENT PROFILE ADULT - CENTRAL VENOUS CATHETER/PICC LINE
Spoke with patient. Informed we received and would call if there are any concerns. New encounter was started for remote, please see encounter on 9/21/21.   no

## 2023-05-24 LAB
ANION GAP SERPL CALC-SCNC: 4 MMOL/L — LOW (ref 5–17)
BUN SERPL-MCNC: 9 MG/DL — SIGNIFICANT CHANGE UP (ref 7–23)
CALCIUM SERPL-MCNC: 8.3 MG/DL — LOW (ref 8.5–10.1)
CHLORIDE SERPL-SCNC: 114 MMOL/L — HIGH (ref 96–108)
CO2 SERPL-SCNC: 25 MMOL/L — SIGNIFICANT CHANGE UP (ref 22–31)
CREAT SERPL-MCNC: 1.04 MG/DL — SIGNIFICANT CHANGE UP (ref 0.5–1.3)
EGFR: 100 ML/MIN/1.73M2 — SIGNIFICANT CHANGE UP
GLUCOSE SERPL-MCNC: 103 MG/DL — HIGH (ref 70–99)
HCT VFR BLD CALC: 34.8 % — LOW (ref 39–50)
HGB BLD-MCNC: 11.2 G/DL — LOW (ref 13–17)
MCHC RBC-ENTMCNC: 31.1 PG — SIGNIFICANT CHANGE UP (ref 27–34)
MCHC RBC-ENTMCNC: 32.2 GM/DL — SIGNIFICANT CHANGE UP (ref 32–36)
MCV RBC AUTO: 96.7 FL — SIGNIFICANT CHANGE UP (ref 80–100)
PLATELET # BLD AUTO: 213 K/UL — SIGNIFICANT CHANGE UP (ref 150–400)
POTASSIUM SERPL-MCNC: 3.6 MMOL/L — SIGNIFICANT CHANGE UP (ref 3.5–5.3)
POTASSIUM SERPL-SCNC: 3.6 MMOL/L — SIGNIFICANT CHANGE UP (ref 3.5–5.3)
RBC # BLD: 3.6 M/UL — LOW (ref 4.2–5.8)
RBC # FLD: 14.1 % — SIGNIFICANT CHANGE UP (ref 10.3–14.5)
SODIUM SERPL-SCNC: 143 MMOL/L — SIGNIFICANT CHANGE UP (ref 135–145)
WBC # BLD: 11.25 K/UL — HIGH (ref 3.8–10.5)
WBC # FLD AUTO: 11.25 K/UL — HIGH (ref 3.8–10.5)

## 2023-05-24 PROCEDURE — 99232 SBSQ HOSP IP/OBS MODERATE 35: CPT

## 2023-05-24 PROCEDURE — 99221 1ST HOSP IP/OBS SF/LOW 40: CPT

## 2023-05-24 RX ORDER — KETOROLAC TROMETHAMINE 30 MG/ML
30 SYRINGE (ML) INJECTION ONCE
Refills: 0 | Status: DISCONTINUED | OUTPATIENT
Start: 2023-05-24 | End: 2023-05-24

## 2023-05-24 RX ORDER — HYDROMORPHONE HYDROCHLORIDE 2 MG/ML
0.5 INJECTION INTRAMUSCULAR; INTRAVENOUS; SUBCUTANEOUS ONCE
Refills: 0 | Status: DISCONTINUED | OUTPATIENT
Start: 2023-05-24 | End: 2023-05-24

## 2023-05-24 RX ORDER — PANTOPRAZOLE SODIUM 20 MG/1
40 TABLET, DELAYED RELEASE ORAL
Refills: 0 | Status: DISCONTINUED | OUTPATIENT
Start: 2023-05-24 | End: 2023-05-25

## 2023-05-24 RX ADMIN — Medication 30 MILLIGRAM(S): at 13:19

## 2023-05-24 RX ADMIN — OXYCODONE HYDROCHLORIDE 10 MILLIGRAM(S): 5 TABLET ORAL at 05:53

## 2023-05-24 RX ADMIN — ENOXAPARIN SODIUM 40 MILLIGRAM(S): 100 INJECTION SUBCUTANEOUS at 13:20

## 2023-05-24 RX ADMIN — SERTRALINE 50 MILLIGRAM(S): 25 TABLET, FILM COATED ORAL at 10:47

## 2023-05-24 RX ADMIN — OXYCODONE HYDROCHLORIDE 10 MILLIGRAM(S): 5 TABLET ORAL at 11:48

## 2023-05-24 RX ADMIN — Medication 975 MILLIGRAM(S): at 21:41

## 2023-05-24 RX ADMIN — Medication 975 MILLIGRAM(S): at 05:25

## 2023-05-24 RX ADMIN — HYDROMORPHONE HYDROCHLORIDE 0.5 MILLIGRAM(S): 2 INJECTION INTRAMUSCULAR; INTRAVENOUS; SUBCUTANEOUS at 21:09

## 2023-05-24 RX ADMIN — OXYCODONE HYDROCHLORIDE 10 MILLIGRAM(S): 5 TABLET ORAL at 10:48

## 2023-05-24 RX ADMIN — OXYCODONE HYDROCHLORIDE 10 MILLIGRAM(S): 5 TABLET ORAL at 18:02

## 2023-05-24 RX ADMIN — Medication 975 MILLIGRAM(S): at 13:19

## 2023-05-24 RX ADMIN — OXYCODONE HYDROCHLORIDE 10 MILLIGRAM(S): 5 TABLET ORAL at 17:05

## 2023-05-24 RX ADMIN — OXYCODONE HYDROCHLORIDE 10 MILLIGRAM(S): 5 TABLET ORAL at 00:41

## 2023-05-24 RX ADMIN — SENNA PLUS 2 TABLET(S): 8.6 TABLET ORAL at 21:41

## 2023-05-24 RX ADMIN — HYDROMORPHONE HYDROCHLORIDE 0.5 MILLIGRAM(S): 2 INJECTION INTRAMUSCULAR; INTRAVENOUS; SUBCUTANEOUS at 20:39

## 2023-05-24 RX ADMIN — GABAPENTIN 300 MILLIGRAM(S): 400 CAPSULE ORAL at 21:41

## 2023-05-24 RX ADMIN — GABAPENTIN 300 MILLIGRAM(S): 400 CAPSULE ORAL at 10:48

## 2023-05-24 RX ADMIN — OXYCODONE HYDROCHLORIDE 10 MILLIGRAM(S): 5 TABLET ORAL at 00:11

## 2023-05-24 RX ADMIN — PANTOPRAZOLE SODIUM 40 MILLIGRAM(S): 20 TABLET, DELAYED RELEASE ORAL at 13:19

## 2023-05-24 RX ADMIN — POLYETHYLENE GLYCOL 3350 17 GRAM(S): 17 POWDER, FOR SOLUTION ORAL at 21:42

## 2023-05-24 RX ADMIN — Medication 975 MILLIGRAM(S): at 05:23

## 2023-05-24 RX ADMIN — ACETAZOLAMIDE 500 MILLIGRAM(S): 250 TABLET ORAL at 10:47

## 2023-05-24 RX ADMIN — Medication 1 TABLET(S): at 10:48

## 2023-05-24 RX ADMIN — OXYCODONE HYDROCHLORIDE 10 MILLIGRAM(S): 5 TABLET ORAL at 05:23

## 2023-05-24 NOTE — PROGRESS NOTE ADULT - SUBJECTIVE AND OBJECTIVE BOX
Patient seen and examined at bedside. Patient tolerated procedure well. Pain well controlled with medication.     Vital Signs Last 24 Hrs  T(C): 36.7 (24 May 2023 04:09), Max: 37.5 (23 May 2023 20:00)  T(F): 98.1 (24 May 2023 04:09), Max: 99.5 (23 May 2023 20:00)  HR: 74 (24 May 2023 04:09) (74 - 109)  BP: 112/73 (24 May 2023 04:09) (112/63 - 137/83)  BP(mean): --  RR: 17 (24 May 2023 04:09) (16 - 18)  SpO2: 95% (24 May 2023 04:09) (93% - 97%)    Parameters below as of 24 May 2023 04:09  Patient On (Oxygen Delivery Method): room air      Exam:  GEN: NAD  Right Lower Extremity:  Splint CDI;                                                       +EHL/FHL  Sensation grossly intact  Digits WWP; Cap refill <2secs                                                                        A/P: 27y Male S/P R Ankle ORIF; Stable, POD2    -Pain Control; Ice prn; Elevate  -DVT PPx : lovenox per ac team recs  -IS  -Am labs  -PT Eval: NWB RLE  -dispo" Home today Patient seen and examined at bedside. Patient tolerated procedure well. Pain well controlled with medication.     Vital Signs Last 24 Hrs  T(C): 36.7 (24 May 2023 04:09), Max: 37.5 (23 May 2023 20:00)  T(F): 98.1 (24 May 2023 04:09), Max: 99.5 (23 May 2023 20:00)  HR: 74 (24 May 2023 04:09) (74 - 109)  BP: 112/73 (24 May 2023 04:09) (112/63 - 137/83)  BP(mean): --  RR: 17 (24 May 2023 04:09) (16 - 18)  SpO2: 95% (24 May 2023 04:09) (93% - 97%)    Parameters below as of 24 May 2023 04:09  Patient On (Oxygen Delivery Method): room air      Exam:  GEN: NAD  Right Lower Extremity:  Splint CDI;                                                       +EHL/FHL  Sensation grossly intact to toes though does endorse mild tingling  Digits WWP; Cap refill <2secs  No pain with passive stretch of toes                                                                     A/P: 27y Male S/P R Ankle ORIF; Stable, POD2    -Pain Control; Ice prn; Elevate  -DVT PPx : lovenox per ac team recs  -IS  -Am labs  -PT Eval: YADY RLE  -dispo" Home today

## 2023-05-24 NOTE — CONSULT NOTE ADULT - SUBJECTIVE AND OBJECTIVE BOX
HPI:  27y Male presents to  s/p  with R ankle pain/rafal equivalent fracture. He states he was walking around a closed bar when he slipped and fell and had immediate R ankle pain and inabbility to ambulate. Denies numbess or tingling RLE. Denies any other acute orthopedic complaints. Denies past orthopedic hx. Endorses pmh of asthma and depression.       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, asymptomatic      Atopic dermatitis      Deviated nasal septum      Asthma      Eczema      GERD (gastroesophageal reflux disease)      Esophageal diverticulum      RAD (reactive airway disease)      SETH (obstructive sleep apnea)  Mandibular advancement with angioplasty 2016      H/O rhinoplasty          Allergies: grass, trees, cats, dogs, horses, ragweed (Other)  dust (Hives)  No Known Drug Allergies                 15.0   11.37 )-----------( 320      ( 22 May 2023 04:31 )             44.9     05-22    142  |  114<H>  |  10  ----------------------------<  122<H>  3.7   |  21<L>  |  1.15    Ca    8.1<L>      22 May 2023 04:31    TPro  7.7  /  Alb  3.7  /  TBili  0.2  /  DBili  x   /  AST  41<H>  /  ALT  83<H>  /  AlkPhos  67  05-22    PT/INR - ( 22 May 2023 04:31 )   PT: 11.3 sec;   INR: 0.97 ratio         PTT - ( 22 May 2023 04:31 )  PTT:29.6 sec      Vital Signs Last 24 Hrs  T(C): 37.1 (22 May 2023 03:46), Max: 37.1 (22 May 2023 03:46)  T(F): 98.7 (22 May 2023 03:46), Max: 98.7 (22 May 2023 03:46)  HR: 87 (22 May 2023 03:46) (87 - 87)  BP: 128/69 (22 May 2023 03:46) (128/69 - 128/69)  BP(mean): --  RR: 20 (22 May 2023 03:46) (20 - 20)  SpO2: 97% (22 May 2023 03:46) (97% - 97%)    Parameters below as of 22 May 2023 03:46  Patient On (Oxygen Delivery Method): room air        Physical Exam:  General: NAD, Alert, Awake and oriented  Musculoskeletal:      R LE: No open skin.   Visible deformity of ankle  No deformities  or other signs of trauma at hip, thigh, knee, leg, or foot/toes.    Full baseline painless ROM at hip, knee, toes  ROM of ankle limited due to pain  Negative log roll.   Able to actively SLR.   SILT toes dp/sp/saph/sural  TTP at the ankle  No bony TTP to hip, knee or foot/calc  + DP/PT pulses palpable with brisk cap refill distally.   Compartments soft and compressible of thigh and lower leg.     Secondary Assessment:  NC/AT, NTTP of clavicles, NTTP of C-,T-,L-Spine, NTTP of Pelvis  UEs: NTTP of Shoulders, Elbows, Wrists, Hands; NT with AROM/PROM of Shoulders, Elbows, Wrists, Hands; AIN/PIN/Med/Uln/Msc/Rad/Ax intact  LLE Able to SLR, NT with Log Roll, NT with Heel Strike, NTTP of Hip, Knee, Ankle, Foot; NT with AROM/PROM of Hip, Knee, Ankle, foot; Q/H/Gsc/TA/EHL/FHL intact    Imaging:  XR of the right ankle demonstrating rafal equivalent with oblique midshaft fibular fracture, and medial clear space widening of the tibia       Orthopedic A/P:    Pt is a  27y Male with R rafal equivalent fracture       -Will Need ORIF today versus outpatient please keep NPO  -Analgesia  -NPO for possible ORIF   -DVT PE ppx, SCDs, hold DVT chemoppx before OR  -F/U Additonal imaging  -N/V Checks to monitor for compartment signs  -will discuss with dr borja and update with plan         (22 May 2023 07:19)      Patient is a 27y old  Male who presents with a chief complaint of Right Ankle Fracture (22 May 2023 18:17)      Consulted by Dr.Adam Borja    for VTE prophylaxis, risk stratification, and anticoagulation management.    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, asymptomatic      Atopic dermatitis      Deviated nasal septum      Asthma      Eczema      GERD (gastroesophageal reflux disease)      Esophageal diverticulum      RAD (reactive airway disease)      SETH (obstructive sleep apnea)  Mandibular advancement with angioplasty 2016      H/O rhinoplasty    Interval history  2023:patient seen at bedside  discussed the necessity of DVT prophylaxis post procedure, patient denies any h/o vte, stroke, or cancer, NWB on his RLE  recommended lovenox , patient is willing to do self injections         CrCl:129.3  BMI:37.1  EBL:20ml    Caprini VTE Risk Score:  AGE RELATED RISK FACTORS                                                       MOBILITY RELATED FACTORS  [ ] Age 41-60 years                                            (1 Point)                  [x ] Bed rest /restricted mobility                             (1 Point)  [ ] Age: 61-74 years                                           (2 Points)                [ ] Plaster cast                                                   (2 Points)  [ ] Age= 75 years                                              (3 Points)                 [ ] Bed bound for more than 72 hours                   (2 Points)    DISEASE RELATED RISK FACTORS                                               GENDER SPECIFIC FACTORS  [ ] Edema in the lower extremities                       (1 Point)           [ ] Pregnancy                                                            (1 Point)  [ ] Varicose veins                                               (1 Point)                  [ ] Post-partum < 6 weeks                                      (1 Point)             [x ] BMI > 25 Kg/m2                                            (1 Point)                  [ ] Hormonal therapy or oral contraception       (1 Point)                 [ ] Sepsis (in the previous month)                        (1 Point)             [ ] History of pregnancy complications                (1Point)  [ ] Pneumonia or serious lung disease                                             [ ] Unexplained or recurrent  (=/>3), premature                                 (In the previous month)                               (1 Point)                birth with toxemia or growth-restricted infant (1 Point)  [ ] Abnormal pulmonary function test            (1 Point)                                   SURGERY RELATED RISK FACTORS  [ ] Acute myocardial infarction                       (1 Point)                  [ ]  Section                                         (1 Point)  [ ] Congestive heart failure (in the previous month) (1 Point)   [ ] Minor surgery   lasting <45 minutes       (1 Point)   [ ] Inflammatory bowel disease                             (1 Point)          [ ] Arthroscopic surgery                                  (2 Points)  [ ] Central venous access                                    (2 Points)            [ ] General surgery lasting >45 minutes      (2 Points)       [ ] Stroke (in the previous month)                  (5 Points)            [ ] Elective major lower extremity arthroplasty (5 Points)                                   [  ] Malignancy (present or past include skin melanoma                                          but exclude  basal skin cell)    (2 points)                                      TRAUMA RELATED RISK FACTORS                HEMATOLOGY RELATED FACTORS                                  [x ] Fracture of the hip, pelvis, or leg                       (5 Points)  [ ] Prior episodes of VTE                                     (3 Points)          [ ] Acute spinal cord injury (in the previous month)  (5 Points)  [ ] Positive family history for VTE                         (3 Points)       [ ] Paralysis (less than 1 month)                          (5 Points)  [ ] Prothrombin 73142 A                                      (3 Points)         [ ] Multiple Trauma (within 1month)                 (5Points)                                                                                                                                                                [ ] Factor V Leiden                                          (3 Points)                                OTHER RISK FACTORS                          [ ] Lupus anticoagulants                                     (3 Points)                       [ ] BMI > 40                          (1 Point)                                                         [ ] Anticardiolipin antibodies                                (3 Points)                   [ ] Smoking                              (1Point)                                                [ ] High homocysteine in the blood                      (3 Points)                [  ] Diabetes requiring insulin (1point)                         [ ] Other congenital or acquired thrombophilia       (3 Points)          [  ] Chemotherapy                   (1 Point)  [ ] Heparin induced thrombocytopenia                  (3 Points)             [  ] Blood Transfusion                (1 point)                                                                                                             Total Score [   7       ]                                                                                                                                                                                                                                                                                                                                            IMPROVE Bleeding Risk Score:1.5      Falls Risk:   High (x  )  Mod (  )  Low (  )      FAMILY HISTORY:  No pertinent family history in first degree relatives  of note: pt is adopted unknown family history      Denies any personal or familial history of clotting or bleeding disorders.    Allergies    grass, trees, cats, dogs, horses, ragweed (Other)  dust (Hives)  No Known Drug Allergies    Intolerances        REVIEW OF SYSTEMS    (  )Fever	     (  )Constipation	(  )SOB				(  )Headache	(  )Dysuria  (  )Chills	     (  )Melena	(  )Dyspnea present on exertion	                    (  )Dizziness                    (  )Polyuria  (  )Nausea	     (  )Hematochezia	(  )Cough			                    (  )Syncope   	(  )Hematuria  (  )Vomiting    (  )Chest Pain	(  )Wheezing			(  )Weakness  (  )Diarrhea     (  )Palpitations	(  )Anorexia			( x )joint pain    All  other review of systems negative: Yes  Vital Signs Last 24 Hrs  T(C): 36.9 (24 May 2023 07:49), Max: 37.5 (23 May 2023 20:00)  T(F): 98.4 (24 May 2023 07:49), Max: 99.5 (23 May 2023 20:00)  HR: 62 (24 May 2023 07:49) (62 - 109)  BP: 100/42 (24 May 2023 07:49) (100/42 - 137/83)  BP(mean): --  RR: 16 (24 May 2023 07:49) (16 - 18)  SpO2: 95% (24 May 2023 07:49) (93% - 97%)    PHYSICAL EXAM:    Constitutional: Appears Well    Neurological: A& O x 3    Skin: Warm    Respiratory and Thorax: normal effort; Breath sounds: normal; No rales/wheezing/rhonchi  	  Cardiovascular: S1, S2, regular, NMBR	    Gastrointestinal: BS + x 4Q, nontender	    Genitourinary:  Bladder nondistended, nontender    Musculoskeletal:   General Right:   + muscle/joint tenderness,   normal tone, no joint swelling,   ROM: limited	    General Left:   no muscle/joint tenderness,   normal tone, no joint swelling,   ROM: full        LE:  Rt: Drsing CDI;  Cap refill good;  +; Sensation intact                      Lower extrems:   Right: no calf tenderness              negative mehran's sign               + pedal pulses    Left:   no calf tenderness              negative mehran's sign               + pedal pulses                          11.2   11.25 )-----------( 213      ( 24 May 2023 07:08 )             34.8       05-24    143  |  114<H>  |  9   ----------------------------<  103<H>  3.6   |  25  |  1.04    Ca    8.3<L>      24 May 2023 07:08        				    MEDICATIONS  (STANDING):  acetaminophen     Tablet .. 975 milliGRAM(s) Oral every 8 hours  acetaZOLAMIDE   ER Capsule 500 milliGRAM(s) Oral daily  albuterol    90 MICROgram(s) HFA Inhaler 2 Puff(s) Inhalation every 6 hours  enoxaparin Injectable 40 milliGRAM(s) SubCutaneous every 24 hours  gabapentin 300 milliGRAM(s) Oral two times a day  multivitamin 1 Tablet(s) Oral daily  polyethylene glycol 3350 17 Gram(s) Oral at bedtime  senna 2 Tablet(s) Oral at bedtime  sertraline 50 milliGRAM(s) Oral daily  sodium chloride 0.9%. 1000 milliLiter(s) IV Continuous <Continuous>          DVT Prophylaxis:  LMWH                   (  )  Heparin SQ           (  )  Coumadin             (  )  Xarelto                  (  )  Eliquis                   (  )  Venodynes           (  )  Ambulation          (  )  UFH                       (  )  Contraindicated  (  )  EC ASPIRIN       (  )

## 2023-05-24 NOTE — PROGRESS NOTE ADULT - SUBJECTIVE AND OBJECTIVE BOX
C/c: RLE pain after a fall    HPI: 27M, pmh of obesity, SETH s/p maxomandibular advancement, Eczema, mitral valve prolapse, Intracranial HTN which presented as visual disturbance, on acetazolamide (follows with neuropthalmologist), Asthma recently started on Fasenra by his allergiest, has never required intubation, but did require icu admission in 2017,  last had an exacerbation in early april with rhinovirus, last used his rescue inhaler back then, recently treated with augmentin for strep pharyngitis, who presented to the ER with right ankle deformity after a fall. He was drinking, slipped and fell while at a bar. He was brought into the ER and admitted to ortho with a right ankle fracture.   Hospitalist service consulted for medical clearance/comanagement.   Underwent ORIF 5/22.    pt seen and examined this am. c/o pain at surgical site. tolerating po. no sob/chest pain. no difficulty voiding.    ROS: all 10 systems reviewed and is as above otherwise negative.     Vital Signs Last 24 Hrs  T(C): 36.9 (24 May 2023 07:49), Max: 37.5 (23 May 2023 20:00)  T(F): 98.4 (24 May 2023 07:49), Max: 99.5 (23 May 2023 20:00)  HR: 62 (24 May 2023 07:49) (62 - 109)  BP: 100/42 (24 May 2023 07:49) (100/42 - 137/83)  RR: 16 (24 May 2023 07:49) (16 - 18)  SpO2: 95% (24 May 2023 07:49) (93% - 95%)    Parameters below as of 24 May 2023 07:49  Patient On (Oxygen Delivery Method): room air          PHYSICAL EXAM:    GENERAL: Comfortable, overweight, pleasant, no acute distress   HEAD:  Normocephalic, atraumatic  EYES: EOMI, PERRLA  HEENT: Moist mucous membranes  NECK: Supple, No JVD  NERVOUS SYSTEM:  Alert & Oriented X3, grossly non focal.   CHEST/LUNG: Clear to auscultation bilaterally  HEART: S1, S2+, Regular rate and rhythm, systolic murmur best heard @ apex  ABDOMEN: Soft, Nontender, Nondistended, Bowel sounds present  GENITOURINARY: Voiding, no palpable bladder  EXTREMITIES:   No clubbing, cyanosis, or edema  MUSCULOSKELETAL- RLE in splint.  SKIN-+eczema    LABS:                        11.2   11.25 )-----------( 213      ( 24 May 2023 07:08 )             34.8     05-24    143  |  114<H>  |  9   ----------------------------<  103<H>  3.6   |  25  |  1.04    Ca    8.3<L>      24 May 2023 07:08         Xray Ankle 2 Views, Right (05.22.23 @ 08:00) >  IMPRESSION: Displaced right mid fibular fracture.  Tibiotalar dislocation improved on splinted films.  Possible small posterior malleolar fracture component.       Xray Chest 1 View- PORTABLE-Urgent (Xray Chest 1 View- PORTABLE-Urgent .) (05.22.23 @ 08:34) >  AP chest on May 12, 2020 3:23 AM.  2 images.  Heart size normal.  Lungs are clear.  Chest is similar to April 12 this year.    EKG: sinus, no acute ischemic changes.     MEDICATIONS  (STANDING):  acetaminophen     Tablet .. 975 milliGRAM(s) Oral every 8 hours  acetaZOLAMIDE   ER Capsule 500 milliGRAM(s) Oral daily  albuterol    90 MICROgram(s) HFA Inhaler 2 Puff(s) Inhalation every 6 hours  ceFAZolin   IVPB 2000 milliGRAM(s) IV Intermittent every 8 hours  enoxaparin Injectable 40 milliGRAM(s) SubCutaneous every 24 hours  gabapentin 300 milliGRAM(s) Oral two times a day  multivitamin 1 Tablet(s) Oral daily  polyethylene glycol 3350 17 Gram(s) Oral at bedtime  senna 2 Tablet(s) Oral at bedtime  sertraline 50 milliGRAM(s) Oral daily  sodium chloride 0.9%. 1000 milliLiter(s) (125 mL/Hr) IV Continuous <Continuous>    MEDICATIONS  (PRN):  albuterol    90 MICROgram(s) HFA Inhaler 1 Puff(s) Inhalation Once PRN Shortness of Breath and/or Wheezing  magnesium hydroxide Suspension 30 milliLiter(s) Oral daily PRN Constipation  ondansetron Injectable 4 milliGRAM(s) IV Push every 6 hours PRN Nausea and/or Vomiting  ondansetron Injectable 4 milliGRAM(s) IV Push every 8 hours PRN Nausea and/or Vomiting  oxyCODONE    IR 5 milliGRAM(s) Oral every 4 hours PRN Moderate Pain (4 - 6)  oxyCODONE    IR 10 milliGRAM(s) Oral every 4 hours PRN Severe Pain (7 - 10)    ASSESSMENT AND PLAN:    1. Mechanical fall/Right ankle fracture:  -s/p ORIF POD#2  -physical therapy  -incentive spirometry  -bowel regimen.    2. Acute blood loss anemia:  -d/t surgery  -no need for transfusion at this time.     3. Asthma:  -symptoms controlled with fasenra.   -continue symbicort.   -prn albuterol.     4. Idiopathic Intracranial HTN:  -continue acetazolamide.     5. Anxiety  -continue sertraline.     6. Eczema:  -on rinvoq outpt-->hold periop    7. DVT px  -lovenox

## 2023-05-24 NOTE — CONSULT NOTE ADULT - CONSULT REASON
Right ankle rafal equivalent
medical clearance
DVT/PE prophylaxis, risk stratification and Anticoagulation Management

## 2023-05-24 NOTE — CONSULT NOTE ADULT - ASSESSMENT
27y Male presents to  s/p  with R ankle pain found to have right rafal equivalent fracture now s/p ORIF on 5/22 Consulted by Dr.Adam Borja    for VTE prophylaxis, risk stratification, and anticoagulation management. patient is high risk for VTE and low bleeding risk    plan  :Lovenox 40mg sq daily till full weightbearing   :daily cbc/bmp  :LE Venodynes  : increase mobility as tolerated  :Thanks for consult will f/u

## 2023-05-25 ENCOUNTER — TRANSCRIPTION ENCOUNTER (OUTPATIENT)
Age: 28
End: 2023-05-25

## 2023-05-25 VITALS — HEART RATE: 70 BPM | SYSTOLIC BLOOD PRESSURE: 118 MMHG | DIASTOLIC BLOOD PRESSURE: 67 MMHG

## 2023-05-25 PROCEDURE — 99232 SBSQ HOSP IP/OBS MODERATE 35: CPT

## 2023-05-25 PROCEDURE — 99231 SBSQ HOSP IP/OBS SF/LOW 25: CPT

## 2023-05-25 RX ORDER — KETOROLAC TROMETHAMINE 30 MG/ML
15 SYRINGE (ML) INJECTION ONCE
Refills: 0 | Status: DISCONTINUED | OUTPATIENT
Start: 2023-05-25 | End: 2023-05-25

## 2023-05-25 RX ADMIN — Medication 15 MILLIGRAM(S): at 12:28

## 2023-05-25 RX ADMIN — PANTOPRAZOLE SODIUM 40 MILLIGRAM(S): 20 TABLET, DELAYED RELEASE ORAL at 05:00

## 2023-05-25 RX ADMIN — OXYCODONE HYDROCHLORIDE 10 MILLIGRAM(S): 5 TABLET ORAL at 09:49

## 2023-05-25 RX ADMIN — GABAPENTIN 300 MILLIGRAM(S): 400 CAPSULE ORAL at 09:50

## 2023-05-25 RX ADMIN — ACETAZOLAMIDE 500 MILLIGRAM(S): 250 TABLET ORAL at 09:49

## 2023-05-25 RX ADMIN — OXYCODONE HYDROCHLORIDE 10 MILLIGRAM(S): 5 TABLET ORAL at 05:01

## 2023-05-25 RX ADMIN — Medication 975 MILLIGRAM(S): at 13:27

## 2023-05-25 RX ADMIN — Medication 975 MILLIGRAM(S): at 05:00

## 2023-05-25 RX ADMIN — ALBUTEROL 2 PUFF(S): 90 AEROSOL, METERED ORAL at 10:04

## 2023-05-25 RX ADMIN — OXYCODONE HYDROCHLORIDE 10 MILLIGRAM(S): 5 TABLET ORAL at 15:30

## 2023-05-25 RX ADMIN — SERTRALINE 50 MILLIGRAM(S): 25 TABLET, FILM COATED ORAL at 09:49

## 2023-05-25 RX ADMIN — Medication 1 TABLET(S): at 09:50

## 2023-05-25 RX ADMIN — ENOXAPARIN SODIUM 40 MILLIGRAM(S): 100 INJECTION SUBCUTANEOUS at 09:49

## 2023-05-25 RX ADMIN — Medication 15 MILLIGRAM(S): at 12:31

## 2023-05-25 RX ADMIN — OXYCODONE HYDROCHLORIDE 10 MILLIGRAM(S): 5 TABLET ORAL at 10:40

## 2023-05-25 RX ADMIN — OXYCODONE HYDROCHLORIDE 10 MILLIGRAM(S): 5 TABLET ORAL at 14:36

## 2023-05-25 RX ADMIN — OXYCODONE HYDROCHLORIDE 10 MILLIGRAM(S): 5 TABLET ORAL at 05:30

## 2023-05-25 NOTE — PROGRESS NOTE ADULT - SUBJECTIVE AND OBJECTIVE BOX
Patient seen and examined at bedside. Pain well controlled with medication. Patient denies any numbness, tingling, weakness, or any other orthopaedic complaint. Denies N/V/CP/SOB.      VITAL SIGNS:  T(C): 36.8 (05-25-23 @ 04:20), Max: 36.9 (05-24-23 @ 07:49)  HR: 63 (05-25-23 @ 04:20) (62 - 88)  BP: 103/85 (05-25-23 @ 04:20) (100/42 - 124/77)  RR: 18 (05-25-23 @ 04:20) (16 - 18)  SpO2: 100% (05-25-23 @ 04:20) (95% - 100%)      LABS:                        11.2   11.25 )-----------( 213      ( 24 May 2023 07:08 )             34.8     05-24    143  |  114<H>  |  9   ----------------------------<  103<H>  3.6   |  25  |  1.04    Ca    8.3<L>      24 May 2023 07:08          Exam:  GEN: NAD  Right Lower Extremity:  Splint CDI;                                                       +EHL/FHL  Sensation grossly intact to toes though does endorse mild tingling  Digits WWP; Cap refill <2secs  No pain with passive stretch of toes                                                                     A/P: 27y Male S/P R Ankle ORIF; Stable, POD3    -Pain Control; Ice prn; Elevate  -DVT PPx : lovenox per ac team recs  -IS  -Am labs  -PT Eval: NWB RLE  -dispo Home today

## 2023-05-25 NOTE — DISCHARGE NOTE NURSING/CASE MANAGEMENT/SOCIAL WORK - PATIENT PORTAL LINK FT
You can access the FollowMyHealth Patient Portal offered by [facility] by registering at the following website: [email or web address]. By joining [facility]’s FollowMyHealth portal, you will also be able to view your health information using other applications (apps) compatible with our system.

## 2023-05-25 NOTE — DISCHARGE NOTE NURSING/CASE MANAGEMENT/SOCIAL WORK - NSDCPEFALRISK_GEN_ALL_CORE
For information on Fall & Injury Prevention, visit: [email or web address] OR  [email or web address] OR  https://www.cdc.gov/steadi/patient.html

## 2023-05-25 NOTE — PROGRESS NOTE ADULT - SUBJECTIVE AND OBJECTIVE BOX
HPI:  27y Male presents to  s/p  with R ankle pain/rafal equivalent fracture. He states he was walking around a closed bar when he slipped and fell and had immediate R ankle pain and inabbility to ambulate. Denies numbess or tingling RLE. Denies any other acute orthopedic complaints. Denies past orthopedic hx. Endorses pmh of asthma and depression.       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, asymptomatic      Atopic dermatitis      Deviated nasal septum      Asthma      Eczema      GERD (gastroesophageal reflux disease)      Esophageal diverticulum      RAD (reactive airway disease)      SETH (obstructive sleep apnea)  Mandibular advancement with angioplasty 2016      H/O rhinoplasty          Allergies: grass, trees, cats, dogs, horses, ragweed (Other)  dust (Hives)  No Known Drug Allergies                 15.0   11.37 )-----------( 320      ( 22 May 2023 04:31 )             44.9     05-22    142  |  114<H>  |  10  ----------------------------<  122<H>  3.7   |  21<L>  |  1.15    Ca    8.1<L>      22 May 2023 04:31    TPro  7.7  /  Alb  3.7  /  TBili  0.2  /  DBili  x   /  AST  41<H>  /  ALT  83<H>  /  AlkPhos  67  05-22    PT/INR - ( 22 May 2023 04:31 )   PT: 11.3 sec;   INR: 0.97 ratio         PTT - ( 22 May 2023 04:31 )  PTT:29.6 sec      Vital Signs Last 24 Hrs  T(C): 37.1 (22 May 2023 03:46), Max: 37.1 (22 May 2023 03:46)  T(F): 98.7 (22 May 2023 03:46), Max: 98.7 (22 May 2023 03:46)  HR: 87 (22 May 2023 03:46) (87 - 87)  BP: 128/69 (22 May 2023 03:46) (128/69 - 128/69)  BP(mean): --  RR: 20 (22 May 2023 03:46) (20 - 20)  SpO2: 97% (22 May 2023 03:46) (97% - 97%)    Parameters below as of 22 May 2023 03:46  Patient On (Oxygen Delivery Method): room air        Physical Exam:  General: NAD, Alert, Awake and oriented  Musculoskeletal:      R LE: No open skin.   Visible deformity of ankle  No deformities  or other signs of trauma at hip, thigh, knee, leg, or foot/toes.    Full baseline painless ROM at hip, knee, toes  ROM of ankle limited due to pain  Negative log roll.   Able to actively SLR.   SILT toes dp/sp/saph/sural  TTP at the ankle  No bony TTP to hip, knee or foot/calc  + DP/PT pulses palpable with brisk cap refill distally.   Compartments soft and compressible of thigh and lower leg.     Secondary Assessment:  NC/AT, NTTP of clavicles, NTTP of C-,T-,L-Spine, NTTP of Pelvis  UEs: NTTP of Shoulders, Elbows, Wrists, Hands; NT with AROM/PROM of Shoulders, Elbows, Wrists, Hands; AIN/PIN/Med/Uln/Msc/Rad/Ax intact  LLE Able to SLR, NT with Log Roll, NT with Heel Strike, NTTP of Hip, Knee, Ankle, Foot; NT with AROM/PROM of Hip, Knee, Ankle, foot; Q/H/Gsc/TA/EHL/FHL intact    Imaging:  XR of the right ankle demonstrating rafal equivalent with oblique midshaft fibular fracture, and medial clear space widening of the tibia       Orthopedic A/P:    Pt is a  27y Male with R rafal equivalent fracture       -Will Need ORIF today versus outpatient please keep NPO  -Analgesia  -NPO for possible ORIF   -DVT PE ppx, SCDs, hold DVT chemoppx before OR  -F/U Additonal imaging  -N/V Checks to monitor for compartment signs  -will discuss with dr borja and update with plan         (22 May 2023 07:19)      Patient is a 27y old  Male who presents with a chief complaint of Right Ankle Fracture (22 May 2023 18:17)      Consulted by Dr.Adam Borja    for VTE prophylaxis, risk stratification, and anticoagulation management.    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, asymptomatic      Atopic dermatitis      Deviated nasal septum      Asthma      Eczema      GERD (gastroesophageal reflux disease)      Esophageal diverticulum      RAD (reactive airway disease)      SETH (obstructive sleep apnea)  Mandibular advancement with angioplasty 2016      H/O rhinoplasty    Interval history  2023:patient seen at bedside  discussed the necessity of DVT prophylaxis post procedure, patient denies any h/o vte, stroke, or cancer, NWB on his RLE  recommended lovenox , patient is willing to do self injections   2023:patient seen at bedside  discussed the necessity of DVT prophylaxis with lovenox denies any concerns     CrCl:129.3  BMI:37.1  EBL:20ml    Caprini VTE Risk Score:  AGE RELATED RISK FACTORS                                                       MOBILITY RELATED FACTORS  [ ] Age 41-60 years                                            (1 Point)                  [x ] Bed rest /restricted mobility                             (1 Point)  [ ] Age: 61-74 years                                           (2 Points)                [ ] Plaster cast                                                   (2 Points)  [ ] Age= 75 years                                              (3 Points)                 [ ] Bed bound for more than 72 hours                   (2 Points)    DISEASE RELATED RISK FACTORS                                               GENDER SPECIFIC FACTORS  [ ] Edema in the lower extremities                       (1 Point)           [ ] Pregnancy                                                            (1 Point)  [ ] Varicose veins                                               (1 Point)                  [ ] Post-partum < 6 weeks                                      (1 Point)             [x ] BMI > 25 Kg/m2                                            (1 Point)                  [ ] Hormonal therapy or oral contraception       (1 Point)                 [ ] Sepsis (in the previous month)                        (1 Point)             [ ] History of pregnancy complications                (1Point)  [ ] Pneumonia or serious lung disease                                             [ ] Unexplained or recurrent  (=/>3), premature                                 (In the previous month)                               (1 Point)                birth with toxemia or growth-restricted infant (1 Point)  [ ] Abnormal pulmonary function test            (1 Point)                                   SURGERY RELATED RISK FACTORS  [ ] Acute myocardial infarction                       (1 Point)                  [ ]  Section                                         (1 Point)  [ ] Congestive heart failure (in the previous month) (1 Point)   [ ] Minor surgery   lasting <45 minutes       (1 Point)   [ ] Inflammatory bowel disease                             (1 Point)          [ ] Arthroscopic surgery                                  (2 Points)  [ ] Central venous access                                    (2 Points)            [ ] General surgery lasting >45 minutes      (2 Points)       [ ] Stroke (in the previous month)                  (5 Points)            [ ] Elective major lower extremity arthroplasty (5 Points)                                   [  ] Malignancy (present or past include skin melanoma                                          but exclude  basal skin cell)    (2 points)                                      TRAUMA RELATED RISK FACTORS                HEMATOLOGY RELATED FACTORS                                  [x ] Fracture of the hip, pelvis, or leg                       (5 Points)  [ ] Prior episodes of VTE                                     (3 Points)          [ ] Acute spinal cord injury (in the previous month)  (5 Points)  [ ] Positive family history for VTE                         (3 Points)       [ ] Paralysis (less than 1 month)                          (5 Points)  [ ] Prothrombin 56209 A                                      (3 Points)         [ ] Multiple Trauma (within 1month)                 (5Points)                                                                                                                                                                [ ] Factor V Leiden                                          (3 Points)                                OTHER RISK FACTORS                          [ ] Lupus anticoagulants                                     (3 Points)                       [ ] BMI > 40                          (1 Point)                                                         [ ] Anticardiolipin antibodies                                (3 Points)                   [ ] Smoking                              (1Point)                                                [ ] High homocysteine in the blood                      (3 Points)                [  ] Diabetes requiring insulin (1point)                         [ ] Other congenital or acquired thrombophilia       (3 Points)          [  ] Chemotherapy                   (1 Point)  [ ] Heparin induced thrombocytopenia                  (3 Points)             [  ] Blood Transfusion                (1 point)                                                                                                             Total Score [   7       ]                                                                                                                                                                                                                                                                                                                                            IMPROVE Bleeding Risk Score:1.5      Falls Risk:   High (x  )  Mod (  )  Low (  )      FAMILY HISTORY:  No pertinent family history in first degree relatives  of note: pt is adopted unknown family history      Denies any personal or familial history of clotting or bleeding disorders.    Allergies    grass, trees, cats, dogs, horses, ragweed (Other)  dust (Hives)  No Known Drug Allergies    Intolerances        REVIEW OF SYSTEMS    (  )Fever	     (  )Constipation	(  )SOB				(  )Headache	(  )Dysuria  (  )Chills	     (  )Melena	(  )Dyspnea present on exertion	                    (  )Dizziness                    (  )Polyuria  (  )Nausea	     (  )Hematochezia	(  )Cough			                    (  )Syncope   	(  )Hematuria  (  )Vomiting    (  )Chest Pain	(  )Wheezing			(  )Weakness  (  )Diarrhea     (  )Palpitations	(  )Anorexia			( x )joint pain    All  other review of systems negative: Yes  Vital Signs Last 24 Hrs  T(C): 36.9 (25 May 2023 07:48), Max: 36.9 (24 May 2023 20:43)  T(F): 98.5 (25 May 2023 07:48), Max: 98.5 (25 May 2023 07:48)  HR: 66 (25 May 2023 07:48) (63 - 88)  BP: 111/63 (25 May 2023 07:48) (103/85 - 124/77)  BP(mean): --  RR: 16 (25 May 2023 07:48) (16 - 18)  SpO2: 96% (25 May 2023 07:48) (95% - 100%)    PHYSICAL EXAM:    Constitutional: Appears Well    Neurological: A& O x 3    Skin: Warm    Respiratory and Thorax: normal effort; Breath sounds: normal; No rales/wheezing/rhonchi  	  Cardiovascular: S1, S2, regular, NMBR	    Gastrointestinal: BS + x 4Q, nontender	    Genitourinary:  Bladder nondistended, nontender    Musculoskeletal:   General Right:   + muscle/joint tenderness,   normal tone, no joint swelling,   ROM: limited	    General Left:   no muscle/joint tenderness,   normal tone, no joint swelling,   ROM: full        LE:  Rt: Drsing CDI;  Cap refill good;  +; Sensation intact                      Lower extrems:   Right: no calf tenderness              negative mehran's sign               + pedal pulses    Left:   no calf tenderness              negative mehran's sign               + pedal pulses                                               11.2   11.25 )-----------( 213      ( 24 May 2023 07:08 )             34.8       05-24    143  |  114<H>  |  9   ----------------------------<  103<H>  3.6   |  25  |  1.04    Ca    8.3<L>      24 May 2023 07:08        				    MEDICATIONS  (STANDING):  acetaminophen     Tablet .. 975 milliGRAM(s) Oral every 8 hours  acetaZOLAMIDE   ER Capsule 500 milliGRAM(s) Oral daily  albuterol    90 MICROgram(s) HFA Inhaler 2 Puff(s) Inhalation every 6 hours  enoxaparin Injectable 40 milliGRAM(s) SubCutaneous every 24 hours  gabapentin 300 milliGRAM(s) Oral two times a day  multivitamin 1 Tablet(s) Oral daily  polyethylene glycol 3350 17 Gram(s) Oral at bedtime  senna 2 Tablet(s) Oral at bedtime  sertraline 50 milliGRAM(s) Oral daily  sodium chloride 0.9%. 1000 milliLiter(s) IV Continuous <Continuous>          DVT Prophylaxis:  LMWH                   (  )  Heparin SQ           (  )  Coumadin             (  )  Xarelto                  (  )  Eliquis                   (  )  Venodynes           (  )  Ambulation          (  )  UFH                       (  )  Contraindicated  (  )  EC ASPIRIN       (  )

## 2023-05-25 NOTE — DISCHARGE NOTE NURSING/CASE MANAGEMENT/SOCIAL WORK - NSDCVIVACCINE_GEN_ALL_CORE_FT
influenza, injectable, quadrivalent, preservative free; 27-Nov-2017 16:06; Tammy Alexis (RN); [employer]; 572KT; IntraMuscular; Deltoid Left.; 0.5 milliLiter(s); VIS (VIS Published: 07-Aug-2015, VIS Presented: 27-Nov-2017);

## 2023-05-25 NOTE — PROGRESS NOTE ADULT - SUBJECTIVE AND OBJECTIVE BOX
C/c: RLE pain after a fall    HPI: 27M, pmh of obesity, SETH s/p maxomandibular advancement, Eczema, mitral valve prolapse, Intracranial HTN which presented as visual disturbance, on acetazolamide (follows with neuropthalmologist), Asthma recently started on Fasenra by his allergiest, has never required intubation, but did require icu admission in 2017,  last had an exacerbation in early april with rhinovirus, last used his rescue inhaler back then, recently treated with augmentin for strep pharyngitis, who presented to the ER with right ankle deformity after a fall. He was drinking, slipped and fell while at a bar. He was brought into the ER and admitted to ortho with a right ankle fracture.   Hospitalist service consulted for medical clearance/comanagement.   Underwent ORIF 5/22.    pt seen and examined. Doing well. Pain better today. no sob/chest pain. tolerating po. no difficulty voiding.     ROS: all 10 systems reviewed and is as above otherwise negative.     Vital Signs Last 24 Hrs  T(C): 36.9 (25 May 2023 07:48), Max: 36.9 (24 May 2023 20:43)  T(F): 98.5 (25 May 2023 07:48), Max: 98.5 (25 May 2023 07:48)  HR: 66 (25 May 2023 07:48) (63 - 88)  BP: 111/63 (25 May 2023 07:48) (103/85 - 124/77)  RR: 16 (25 May 2023 07:48) (16 - 18)  SpO2: 96% (25 May 2023 07:48) (95% - 100%)    Parameters below as of 25 May 2023 07:48  Patient On (Oxygen Delivery Method): room air          PHYSICAL EXAM:    GENERAL: Comfortable, overweight, pleasant, no acute distress   HEAD:  Normocephalic, atraumatic  EYES: EOMI, PERRLA  HEENT: Moist mucous membranes  NECK: Supple, No JVD  NERVOUS SYSTEM:  Alert & Oriented X3, grossly non focal.   CHEST/LUNG: Clear to auscultation bilaterally  HEART: S1, S2+, Regular rate and rhythm, systolic murmur best heard @ apex  ABDOMEN: Soft, Nontender, Nondistended, Bowel sounds present  GENITOURINARY: Voiding, no palpable bladder  EXTREMITIES:   No clubbing, cyanosis, or edema  MUSCULOSKELETAL- RLE in splint.  SKIN-+eczema    LABS:                        11.2   11.25 )-----------( 213      ( 24 May 2023 07:08 )             34.8     05-24    143  |  114<H>  |  9   ----------------------------<  103<H>  3.6   |  25  |  1.04    Ca    8.3<L>      24 May 2023 07:08      Xray Ankle 2 Views, Right (05.22.23 @ 08:00) >  IMPRESSION: Displaced right mid fibular fracture.  Tibiotalar dislocation improved on splinted films.  Possible small posterior malleolar fracture component.       Xray Chest 1 View- PORTABLE-Urgent (Xray Chest 1 View- PORTABLE-Urgent .) (05.22.23 @ 08:34) >  AP chest on May 12, 2020 3:23 AM.  2 images.  Heart size normal.  Lungs are clear.  Chest is similar to April 12 this year.    EKG: sinus, no acute ischemic changes.     MEDICATIONS  (STANDING):  acetaminophen     Tablet .. 975 milliGRAM(s) Oral every 8 hours  acetaZOLAMIDE   ER Capsule 500 milliGRAM(s) Oral daily  albuterol    90 MICROgram(s) HFA Inhaler 2 Puff(s) Inhalation every 6 hours  enoxaparin Injectable 40 milliGRAM(s) SubCutaneous every 24 hours  gabapentin 300 milliGRAM(s) Oral two times a day  ketorolac   Injectable 15 milliGRAM(s) IntraMuscular once  multivitamin 1 Tablet(s) Oral daily  pantoprazole    Tablet 40 milliGRAM(s) Oral before breakfast  polyethylene glycol 3350 17 Gram(s) Oral at bedtime  senna 2 Tablet(s) Oral at bedtime  sertraline 50 milliGRAM(s) Oral daily  sodium chloride 0.9%. 1000 milliLiter(s) (125 mL/Hr) IV Continuous <Continuous>    MEDICATIONS  (PRN):  albuterol    90 MICROgram(s) HFA Inhaler 1 Puff(s) Inhalation Once PRN Shortness of Breath and/or Wheezing  magnesium hydroxide Suspension 30 milliLiter(s) Oral daily PRN Constipation  ondansetron Injectable 4 milliGRAM(s) IV Push every 6 hours PRN Nausea and/or Vomiting  ondansetron Injectable 4 milliGRAM(s) IV Push every 8 hours PRN Nausea and/or Vomiting  oxyCODONE    IR 5 milliGRAM(s) Oral every 4 hours PRN Moderate Pain (4 - 6)  oxyCODONE    IR 10 milliGRAM(s) Oral every 4 hours PRN Severe Pain (7 - 10)      ASSESSMENT AND PLAN:    1. Mechanical fall/Right ankle fracture:  -s/p ORIF POD#3  -physical therapy  -incentive spirometry  -bowel regimen.    2. Acute blood loss anemia:  -d/t surgery  -no need for transfusion at this time.     3. Asthma:  -symptoms controlled with fasenra.   -continue symbicort.   -prn albuterol.     4. Idiopathic Intracranial HTN:  -continue acetazolamide.     5. Anxiety  -continue sertraline.     6. Eczema:  -on rinvoq outpt-->hold periop    7. DVT px  -lovenox

## 2023-05-25 NOTE — PROGRESS NOTE ADULT - ASSESSMENT
27y Male presents to  s/p  with R ankle pain found to have right rafal equivalent fracture now s/p ORIF on 5/22 Consulted by Dr.Adam Borja    for VTE prophylaxis, risk stratification, and anticoagulation management. patient is high risk for VTE and low bleeding risk    plan  :Lovenox 40mg sq daily till full weightbearing   :daily cbc/bmp  :LE Venodynes  : increase mobility as tolerated  :will f/u

## 2023-05-31 DIAGNOSIS — E66.9 OBESITY, UNSPECIFIED: ICD-10-CM

## 2023-05-31 DIAGNOSIS — G93.2 BENIGN INTRACRANIAL HYPERTENSION: ICD-10-CM
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
## 2023-05-31 DIAGNOSIS — F41.9 ANXIETY DISORDER, UNSPECIFIED: ICD-10-CM

## 2023-05-31 DIAGNOSIS — D62 ACUTE POSTHEMORRHAGIC ANEMIA: ICD-10-CM
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
## 2023-05-31 DIAGNOSIS — S82.841A DISPLACED BIMALLEOLAR FRACTURE OF RIGHT LOWER LEG, INITIAL ENCOUNTER FOR CLOSED FRACTURE: ICD-10-CM

## 2023-05-31 DIAGNOSIS — L30.9 DERMATITIS, UNSPECIFIED: ICD-10-CM

## 2023-05-31 DIAGNOSIS — Q23.9 CONGENITAL MALFORMATION OF AORTIC AND MITRAL VALVES, UNSPECIFIED: ICD-10-CM

## 2023-05-31 DIAGNOSIS — Z79.52 LONG TERM (CURRENT) USE OF SYSTEMIC STEROIDS: ICD-10-CM
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
## 2023-05-31 DIAGNOSIS — J45.909 UNSPECIFIED ASTHMA, UNCOMPLICATED: ICD-10-CM

## 2023-06-01 ENCOUNTER — LABORATORY RESULT (OUTPATIENT)
Age: 28
End: 2023-06-01

## 2023-06-05 ENCOUNTER — APPOINTMENT (OUTPATIENT)
Dept: ORTHOPEDIC SURGERY | Facility: CLINIC | Age: 28
End: 2023-06-05
Payer: COMMERCIAL

## 2023-06-05 PROCEDURE — 73610 X-RAY EXAM OF ANKLE: CPT | Mod: RT

## 2023-06-05 PROCEDURE — 99024 POSTOP FOLLOW-UP VISIT: CPT

## 2023-06-05 NOTE — HISTORY OF PRESENT ILLNESS
[Sutures Removed] : sutures were not removed [de-identified] : s/p ORIF right distal tib-fib syndesmosis joint disruption. DOS: 5/22/23 [de-identified] : 27 year year old male present in the office 2 weeks post op from 5/22/23. The patient's pain is noted to be a 1/10. \par He is not currently taking any pain medication. No other complaints at this time.  [de-identified] : General: Alert and oriented x3. In no acute distress. Pleasant in nature with a normal affect. No apparent respiratory distress.\par The wound is intact. no evidence of any diastases or dehiscence. No surrounding erythema noted. No fluctuance. The area is warm and well perfused. The patient is able to wiggle their toes. Neurovascularly intact.  [de-identified] : 3V of the right ankle were ordered obtained and reviewed by me today, 06/05/2023 , revealed: Hardware intact, good alignment. [de-identified] :  s/p ORIF right distal tib-fib syndesmosis joint disruption. DOS: 5/22/23. [de-identified] : XR films obtained today were reviewed with the patient in office today. I advised the patient to continue with NWB, Aspirin, Ice, NSAIDS PRN, and heat. They can also elevate their right ankle above the level of the heart. I have addressed all the patient's concerns surrounding the pathology of their condition. The patient understood and verbally agreed to the treatment plan. All of their questions were answered and they were satisfied with the visit. The patient should call the office if they have any questions or experience worsening symptoms. \par \par I recommend the patient to return to the office in 10 days for suture removal and follow up evaluation.

## 2023-06-10 ENCOUNTER — TRANSCRIPTION ENCOUNTER (OUTPATIENT)
Age: 28
End: 2023-06-10

## 2023-06-16 ENCOUNTER — APPOINTMENT (OUTPATIENT)
Dept: ORTHOPEDIC SURGERY | Facility: CLINIC | Age: 28
End: 2023-06-16
Payer: COMMERCIAL

## 2023-06-16 PROCEDURE — 73610 X-RAY EXAM OF ANKLE: CPT | Mod: RT

## 2023-06-16 PROCEDURE — 99024 POSTOP FOLLOW-UP VISIT: CPT

## 2023-06-16 PROCEDURE — 73590 X-RAY EXAM OF LOWER LEG: CPT | Mod: RT

## 2023-06-16 NOTE — HISTORY OF PRESENT ILLNESS
[Clean/Dry/Intact] : clean, dry and intact [Healed] : healed [Swelling] : swollen [Neuro Intact] : an unremarkable neurological exam [Vascular Intact] : ~T peripheral vascular exam normal [Negative Janine's] : maneuvers demonstrated a negative Janine's sign [Sutures Removed] : sutures were removed [Erythema] : not erythematous [Discharge] : absent of discharge [Dehiscence] : not dehisced [de-identified] : s/p ORIF right distal tib-fib syndesmosis joint disruption. DOS: 5/22/23 [de-identified] : 27 year year old male present in the office post op from 5/22/23. The patient's pain is noted to be a 1/10. \par He is not currently taking any pain medication. No other complaints at this time.  [de-identified] : General: Alert and oriented x3. In no acute distress. Pleasant in nature with a normal affect. No apparent respiratory distress.\par \par Sutures removed.\par The wound is intact. no evidence of any diastases or dehiscence. No surrounding erythema noted. No fluctuance. The area is warm and well perfused. The patient is able to wiggle their toes. Neurovascularly intact.  [de-identified] : 7V of the left ankle and left tib-fib were ordered obtained and reviewed by me today, 06/16/2023 , revealed:  [de-identified] :  s/p ORIF right distal tib-fib syndesmosis joint disruption. DOS: 5/22/23. [de-identified] : Sutures were removed in office today. I recommended that the patient utilize an ASO brace. The patient was fitted for the ASO brace in the office today. I recommend the patient undergo a course of physical therapy for the left ankle 2-3 times a week for a total of 6-8 weeks. A prescription was given for the physical therapy today. I recommend that the patient utilize ice, NSAIDS PRN, and heat. They can also elevate their left ankle above the level of the heart. \par I have addressed all the patient's concerns surrounding the pathology of their condition. The patient understood and verbally agreed to the treatment plan. All of their questions were answered and they were satisfied with the visit. The patient should call the office if they have any questions or experience worsening symptoms. \par \par The patient will return in 4-6 weeks

## 2023-06-28 ENCOUNTER — NON-APPOINTMENT (OUTPATIENT)
Age: 28
End: 2023-06-28

## 2023-07-21 ENCOUNTER — APPOINTMENT (OUTPATIENT)
Dept: ORTHOPEDIC SURGERY | Facility: CLINIC | Age: 28
End: 2023-07-21
Payer: COMMERCIAL

## 2023-07-21 DIAGNOSIS — M79.661 PAIN IN RIGHT LOWER LEG: ICD-10-CM

## 2023-07-21 PROCEDURE — 99024 POSTOP FOLLOW-UP VISIT: CPT

## 2023-07-21 PROCEDURE — 73610 X-RAY EXAM OF ANKLE: CPT | Mod: RT

## 2023-07-21 NOTE — HISTORY OF PRESENT ILLNESS
[Clean/Dry/Intact] : clean, dry and intact [Healed] : healed [Swelling] : swollen [Neuro Intact] : an unremarkable neurological exam [Vascular Intact] : ~T peripheral vascular exam normal [Negative Janine's] : maneuvers demonstrated a negative Janine's sign [Erythema] : not erythematous [Discharge] : absent of discharge [Dehiscence] : not dehisced [de-identified] : s/p ORIF right distal tib-fib syndesmosis joint disruption. DOS: 5/22/23 [de-identified] : 27 year year old male present in the office post op from 5/22/23. The patient's pain is noted to be a 1/10. \par He is not currently taking any pain medication. No other complaints at this time. He is weight bearing as tolerated and is ambulating with crutches as needed. He does complain of calf pain.  [de-identified] : General: Alert and oriented x3. In no acute distress. Pleasant in nature with a normal affect. No apparent respiratory distress.\par \par Sutures removed.\par The wound is intact. no evidence of any diastases or dehiscence. No surrounding erythema noted. No fluctuance. The area is warm and well perfused. The patient is able to wiggle their toes. Neurovascularly intact.  [de-identified] : 7V of the left ankle and left tib-fib were ordered obtained and reviewed by me today, 07/21/2023 , revealed: hardware intact, stable, no misalignment. [de-identified] :  s/p ORIF right distal tib-fib syndesmosis joint disruption. DOS: 5/22/23. [de-identified] : An updated prescription was given for the physical therapy today. He will remain WB as tolerated. I recommend that the patient utilize ice, NSAIDS PRN, and heat. They can also elevate their left ankle above the level of the heart. I recommend that the patient receive a venus Doppler DVT test. The DVT test was ordered for the patient in the office today. \par I have addressed all the patient's concerns surrounding the pathology of their condition. The patient understood and verbally agreed to the treatment plan. All of their questions were answered and they were satisfied with the visit. The patient should call the office if they have any questions or experience worsening symptoms. \par \par The patient will return in 2-3 months.

## 2023-09-27 ENCOUNTER — APPOINTMENT (OUTPATIENT)
Dept: ORTHOPEDIC SURGERY | Facility: CLINIC | Age: 28
End: 2023-09-27
Payer: COMMERCIAL

## 2023-09-27 DIAGNOSIS — S82.861D DISPLACED MAISONNEUVE'S FRACTURE OF RIGHT LEG, SUBSEQUENT ENCOUNTER FOR CLOSED FRACTURE WITH ROUTINE HEALING: ICD-10-CM

## 2023-09-27 PROCEDURE — 99213 OFFICE O/P EST LOW 20 MIN: CPT

## 2023-09-27 PROCEDURE — 73600 X-RAY EXAM OF ANKLE: CPT | Mod: RT

## 2023-09-27 PROCEDURE — 73590 X-RAY EXAM OF LOWER LEG: CPT | Mod: RT

## 2023-11-06 NOTE — ED ADULT NURSE NOTE - NS ED NURSE RECORD ANOTHER HT AND WT
Yes Detail Level: Zone Initiate Treatment: Continue hydrocortisone Render In Strict Bullet Format?: No Continue Regimen: Hydroquinone 4% cream apply once nightly to affected dark areas on face x1 more month then discontinue

## 2023-11-06 NOTE — H&P PST ADULT - PROBLEM SELECTOR PROBLEM 3
Reviewed heart scan result.    CAC score 0,  Incidental finding noted on heart scan report of Multiple hepatic hypodensities.   Patient has agreed to discuss results with PCP. Report forwarded to PCP. Discussed heart healthy lifestyle and importance of risk factor modification with patient, as well as importance of continued compliance with PCP screenings for HTN, diabetes, cholesterol. Pt verbalized understanding and denies any further questions or concerns at present.  
We should be calling you: Please notify patient of test results: her CT Calcium score was = 0. negative for advanced coronary artery disease. Her liver had multiple spots on it. No prior images. Labs had an elevated bilirubin. This could be anything from cysts, birthmarks, to tumors. The next step is to get a colonoscopy and mammogram.  (if these are liver tumors, colon or breast are common sources)  She still needs a colonoscopy urgently. She has not yet gotten this scheduled. Looks like she has the phone number?  Jenifer Slaughter MD   Written by Jenifer Slaughter MD on 11/3/2023  1:29 PM CDT  Seen by patient Shital Loveczak on 11/3/2023  1:31 PM    
Sleep apnea

## 2023-11-07 ENCOUNTER — EMERGENCY (EMERGENCY)
Facility: HOSPITAL | Age: 28
LOS: 0 days | Discharge: HOME | End: 2023-11-07
Attending: EMERGENCY MEDICINE
Payer: COMMERCIAL

## 2023-11-07 VITALS
OXYGEN SATURATION: 98 % | DIASTOLIC BLOOD PRESSURE: 80 MMHG | HEART RATE: 62 BPM | RESPIRATION RATE: 12 BRPM | SYSTOLIC BLOOD PRESSURE: 108 MMHG | TEMPERATURE: 98 F

## 2023-11-07 VITALS
RESPIRATION RATE: 17 BRPM | WEIGHT: 250 LBS | TEMPERATURE: 98 F | SYSTOLIC BLOOD PRESSURE: 124 MMHG | DIASTOLIC BLOOD PRESSURE: 82 MMHG | OXYGEN SATURATION: 98 %

## 2023-11-07 DIAGNOSIS — R74.8 ABNORMAL LEVELS OF OTHER SERUM ENZYMES: ICD-10-CM

## 2023-11-07 DIAGNOSIS — R00.2 PALPITATIONS: ICD-10-CM

## 2023-11-07 DIAGNOSIS — R20.2 PARESTHESIA OF SKIN: ICD-10-CM

## 2023-11-07 DIAGNOSIS — K21.9 GASTRO-ESOPHAGEAL REFLUX DISEASE WITHOUT ESOPHAGITIS: ICD-10-CM

## 2023-11-07 DIAGNOSIS — Z98.890 OTHER SPECIFIED POSTPROCEDURAL STATES: Chronic | ICD-10-CM

## 2023-11-07 DIAGNOSIS — I51.7 CARDIOMEGALY: ICD-10-CM

## 2023-11-07 DIAGNOSIS — R61 GENERALIZED HYPERHIDROSIS: ICD-10-CM

## 2023-11-07 DIAGNOSIS — G93.2 BENIGN INTRACRANIAL HYPERTENSION: ICD-10-CM

## 2023-11-07 DIAGNOSIS — Z91.09 OTHER ALLERGY STATUS, OTHER THAN TO DRUGS AND BIOLOGICAL SUBSTANCES: ICD-10-CM

## 2023-11-07 DIAGNOSIS — J45.909 UNSPECIFIED ASTHMA, UNCOMPLICATED: ICD-10-CM

## 2023-11-07 DIAGNOSIS — G47.33 OBSTRUCTIVE SLEEP APNEA (ADULT) (PEDIATRIC): Chronic | ICD-10-CM

## 2023-11-07 LAB
ALBUMIN SERPL ELPH-MCNC: 3.6 G/DL — SIGNIFICANT CHANGE UP (ref 3.3–5)
ALBUMIN SERPL ELPH-MCNC: 3.6 G/DL — SIGNIFICANT CHANGE UP (ref 3.3–5)
ALP SERPL-CCNC: 74 U/L — SIGNIFICANT CHANGE UP (ref 40–120)
ALP SERPL-CCNC: 74 U/L — SIGNIFICANT CHANGE UP (ref 40–120)
ALT FLD-CCNC: 84 U/L — HIGH (ref 12–78)
ALT FLD-CCNC: 84 U/L — HIGH (ref 12–78)
ANION GAP SERPL CALC-SCNC: 6 MMOL/L — SIGNIFICANT CHANGE UP (ref 5–17)
ANION GAP SERPL CALC-SCNC: 6 MMOL/L — SIGNIFICANT CHANGE UP (ref 5–17)
APPEARANCE UR: ABNORMAL
APPEARANCE UR: ABNORMAL
APTT BLD: 31.4 SEC — SIGNIFICANT CHANGE UP (ref 24.5–35.6)
APTT BLD: 31.4 SEC — SIGNIFICANT CHANGE UP (ref 24.5–35.6)
AST SERPL-CCNC: 90 U/L — HIGH (ref 15–37)
AST SERPL-CCNC: 90 U/L — HIGH (ref 15–37)
BACTERIA # UR AUTO: NEGATIVE /HPF — SIGNIFICANT CHANGE UP
BACTERIA # UR AUTO: NEGATIVE /HPF — SIGNIFICANT CHANGE UP
BASOPHILS # BLD AUTO: 0.03 K/UL — SIGNIFICANT CHANGE UP (ref 0–0.2)
BASOPHILS # BLD AUTO: 0.03 K/UL — SIGNIFICANT CHANGE UP (ref 0–0.2)
BASOPHILS NFR BLD AUTO: 0.3 % — SIGNIFICANT CHANGE UP (ref 0–2)
BASOPHILS NFR BLD AUTO: 0.3 % — SIGNIFICANT CHANGE UP (ref 0–2)
BILIRUB SERPL-MCNC: 0.3 MG/DL — SIGNIFICANT CHANGE UP (ref 0.2–1.2)
BILIRUB SERPL-MCNC: 0.3 MG/DL — SIGNIFICANT CHANGE UP (ref 0.2–1.2)
BILIRUB UR-MCNC: NEGATIVE — SIGNIFICANT CHANGE UP
BILIRUB UR-MCNC: NEGATIVE — SIGNIFICANT CHANGE UP
BUN SERPL-MCNC: 14 MG/DL — SIGNIFICANT CHANGE UP (ref 7–23)
BUN SERPL-MCNC: 14 MG/DL — SIGNIFICANT CHANGE UP (ref 7–23)
CALCIUM SERPL-MCNC: 8.3 MG/DL — LOW (ref 8.5–10.1)
CALCIUM SERPL-MCNC: 8.3 MG/DL — LOW (ref 8.5–10.1)
CAST: 0 /LPF — SIGNIFICANT CHANGE UP (ref 0–4)
CAST: 0 /LPF — SIGNIFICANT CHANGE UP (ref 0–4)
CHLORIDE SERPL-SCNC: 111 MMOL/L — HIGH (ref 96–108)
CHLORIDE SERPL-SCNC: 111 MMOL/L — HIGH (ref 96–108)
CK SERPL-CCNC: 3114 U/L — HIGH (ref 26–308)
CK SERPL-CCNC: 3114 U/L — HIGH (ref 26–308)
CO2 SERPL-SCNC: 23 MMOL/L — SIGNIFICANT CHANGE UP (ref 22–31)
CO2 SERPL-SCNC: 23 MMOL/L — SIGNIFICANT CHANGE UP (ref 22–31)
COLOR SPEC: YELLOW — SIGNIFICANT CHANGE UP
COLOR SPEC: YELLOW — SIGNIFICANT CHANGE UP
CREAT SERPL-MCNC: 1.22 MG/DL — SIGNIFICANT CHANGE UP (ref 0.5–1.3)
CREAT SERPL-MCNC: 1.22 MG/DL — SIGNIFICANT CHANGE UP (ref 0.5–1.3)
D DIMER BLD IA.RAPID-MCNC: 206 NG/ML DDU — SIGNIFICANT CHANGE UP
D DIMER BLD IA.RAPID-MCNC: 206 NG/ML DDU — SIGNIFICANT CHANGE UP
DIFF PNL FLD: NEGATIVE — SIGNIFICANT CHANGE UP
DIFF PNL FLD: NEGATIVE — SIGNIFICANT CHANGE UP
EGFR: 83 ML/MIN/1.73M2 — SIGNIFICANT CHANGE UP
EGFR: 83 ML/MIN/1.73M2 — SIGNIFICANT CHANGE UP
EOSINOPHIL # BLD AUTO: 0 K/UL — SIGNIFICANT CHANGE UP (ref 0–0.5)
EOSINOPHIL # BLD AUTO: 0 K/UL — SIGNIFICANT CHANGE UP (ref 0–0.5)
EOSINOPHIL NFR BLD AUTO: 0 % — SIGNIFICANT CHANGE UP (ref 0–6)
EOSINOPHIL NFR BLD AUTO: 0 % — SIGNIFICANT CHANGE UP (ref 0–6)
GLUCOSE SERPL-MCNC: 100 MG/DL — HIGH (ref 70–99)
GLUCOSE SERPL-MCNC: 100 MG/DL — HIGH (ref 70–99)
GLUCOSE UR QL: NEGATIVE MG/DL — SIGNIFICANT CHANGE UP
GLUCOSE UR QL: NEGATIVE MG/DL — SIGNIFICANT CHANGE UP
HCT VFR BLD CALC: 41.4 % — SIGNIFICANT CHANGE UP (ref 39–50)
HCT VFR BLD CALC: 41.4 % — SIGNIFICANT CHANGE UP (ref 39–50)
HGB BLD-MCNC: 13.9 G/DL — SIGNIFICANT CHANGE UP (ref 13–17)
HGB BLD-MCNC: 13.9 G/DL — SIGNIFICANT CHANGE UP (ref 13–17)
IMM GRANULOCYTES NFR BLD AUTO: 0.2 % — SIGNIFICANT CHANGE UP (ref 0–0.9)
IMM GRANULOCYTES NFR BLD AUTO: 0.2 % — SIGNIFICANT CHANGE UP (ref 0–0.9)
INR BLD: 0.93 RATIO — SIGNIFICANT CHANGE UP (ref 0.85–1.18)
INR BLD: 0.93 RATIO — SIGNIFICANT CHANGE UP (ref 0.85–1.18)
KETONES UR-MCNC: NEGATIVE MG/DL — SIGNIFICANT CHANGE UP
KETONES UR-MCNC: NEGATIVE MG/DL — SIGNIFICANT CHANGE UP
LEUKOCYTE ESTERASE UR-ACNC: NEGATIVE — SIGNIFICANT CHANGE UP
LEUKOCYTE ESTERASE UR-ACNC: NEGATIVE — SIGNIFICANT CHANGE UP
LYMPHOCYTES # BLD AUTO: 1.67 K/UL — SIGNIFICANT CHANGE UP (ref 1–3.3)
LYMPHOCYTES # BLD AUTO: 1.67 K/UL — SIGNIFICANT CHANGE UP (ref 1–3.3)
LYMPHOCYTES # BLD AUTO: 18.3 % — SIGNIFICANT CHANGE UP (ref 13–44)
LYMPHOCYTES # BLD AUTO: 18.3 % — SIGNIFICANT CHANGE UP (ref 13–44)
MAGNESIUM SERPL-MCNC: 1.9 MG/DL — SIGNIFICANT CHANGE UP (ref 1.6–2.6)
MAGNESIUM SERPL-MCNC: 1.9 MG/DL — SIGNIFICANT CHANGE UP (ref 1.6–2.6)
MCHC RBC-ENTMCNC: 30.2 PG — SIGNIFICANT CHANGE UP (ref 27–34)
MCHC RBC-ENTMCNC: 30.2 PG — SIGNIFICANT CHANGE UP (ref 27–34)
MCHC RBC-ENTMCNC: 33.6 GM/DL — SIGNIFICANT CHANGE UP (ref 32–36)
MCHC RBC-ENTMCNC: 33.6 GM/DL — SIGNIFICANT CHANGE UP (ref 32–36)
MCV RBC AUTO: 90 FL — SIGNIFICANT CHANGE UP (ref 80–100)
MCV RBC AUTO: 90 FL — SIGNIFICANT CHANGE UP (ref 80–100)
MONOCYTES # BLD AUTO: 1.24 K/UL — HIGH (ref 0–0.9)
MONOCYTES # BLD AUTO: 1.24 K/UL — HIGH (ref 0–0.9)
MONOCYTES NFR BLD AUTO: 13.6 % — SIGNIFICANT CHANGE UP (ref 2–14)
MONOCYTES NFR BLD AUTO: 13.6 % — SIGNIFICANT CHANGE UP (ref 2–14)
NEUTROPHILS # BLD AUTO: 6.15 K/UL — SIGNIFICANT CHANGE UP (ref 1.8–7.4)
NEUTROPHILS # BLD AUTO: 6.15 K/UL — SIGNIFICANT CHANGE UP (ref 1.8–7.4)
NEUTROPHILS NFR BLD AUTO: 67.6 % — SIGNIFICANT CHANGE UP (ref 43–77)
NEUTROPHILS NFR BLD AUTO: 67.6 % — SIGNIFICANT CHANGE UP (ref 43–77)
NITRITE UR-MCNC: NEGATIVE — SIGNIFICANT CHANGE UP
NITRITE UR-MCNC: NEGATIVE — SIGNIFICANT CHANGE UP
NT-PROBNP SERPL-SCNC: 19 PG/ML — SIGNIFICANT CHANGE UP (ref 0–125)
NT-PROBNP SERPL-SCNC: 19 PG/ML — SIGNIFICANT CHANGE UP (ref 0–125)
PH UR: 8 — SIGNIFICANT CHANGE UP (ref 5–8)
PH UR: 8 — SIGNIFICANT CHANGE UP (ref 5–8)
PLATELET # BLD AUTO: 259 K/UL — SIGNIFICANT CHANGE UP (ref 150–400)
PLATELET # BLD AUTO: 259 K/UL — SIGNIFICANT CHANGE UP (ref 150–400)
POTASSIUM SERPL-MCNC: 3.9 MMOL/L — SIGNIFICANT CHANGE UP (ref 3.5–5.3)
POTASSIUM SERPL-MCNC: 3.9 MMOL/L — SIGNIFICANT CHANGE UP (ref 3.5–5.3)
POTASSIUM SERPL-SCNC: 3.9 MMOL/L — SIGNIFICANT CHANGE UP (ref 3.5–5.3)
POTASSIUM SERPL-SCNC: 3.9 MMOL/L — SIGNIFICANT CHANGE UP (ref 3.5–5.3)
PROT SERPL-MCNC: 7.2 GM/DL — SIGNIFICANT CHANGE UP (ref 6–8.3)
PROT SERPL-MCNC: 7.2 GM/DL — SIGNIFICANT CHANGE UP (ref 6–8.3)
PROT UR-MCNC: NEGATIVE MG/DL — SIGNIFICANT CHANGE UP
PROT UR-MCNC: NEGATIVE MG/DL — SIGNIFICANT CHANGE UP
PROTHROM AB SERPL-ACNC: 10.5 SEC — SIGNIFICANT CHANGE UP (ref 9.5–13)
PROTHROM AB SERPL-ACNC: 10.5 SEC — SIGNIFICANT CHANGE UP (ref 9.5–13)
RBC # BLD: 4.6 M/UL — SIGNIFICANT CHANGE UP (ref 4.2–5.8)
RBC # BLD: 4.6 M/UL — SIGNIFICANT CHANGE UP (ref 4.2–5.8)
RBC # FLD: 15.3 % — HIGH (ref 10.3–14.5)
RBC # FLD: 15.3 % — HIGH (ref 10.3–14.5)
RBC CASTS # UR COMP ASSIST: 0 /HPF — SIGNIFICANT CHANGE UP (ref 0–4)
RBC CASTS # UR COMP ASSIST: 0 /HPF — SIGNIFICANT CHANGE UP (ref 0–4)
SODIUM SERPL-SCNC: 140 MMOL/L — SIGNIFICANT CHANGE UP (ref 135–145)
SODIUM SERPL-SCNC: 140 MMOL/L — SIGNIFICANT CHANGE UP (ref 135–145)
SP GR SPEC: 1.01 — SIGNIFICANT CHANGE UP (ref 1–1.03)
SP GR SPEC: 1.01 — SIGNIFICANT CHANGE UP (ref 1–1.03)
SQUAMOUS # UR AUTO: 0 /HPF — SIGNIFICANT CHANGE UP (ref 0–5)
SQUAMOUS # UR AUTO: 0 /HPF — SIGNIFICANT CHANGE UP (ref 0–5)
TROPONIN I, HIGH SENSITIVITY RESULT: 11.25 NG/L — SIGNIFICANT CHANGE UP
TROPONIN I, HIGH SENSITIVITY RESULT: 11.25 NG/L — SIGNIFICANT CHANGE UP
TROPONIN I, HIGH SENSITIVITY RESULT: 9.28 NG/L — SIGNIFICANT CHANGE UP
TROPONIN I, HIGH SENSITIVITY RESULT: 9.28 NG/L — SIGNIFICANT CHANGE UP
UROBILINOGEN FLD QL: 1 MG/DL — SIGNIFICANT CHANGE UP (ref 0.2–1)
UROBILINOGEN FLD QL: 1 MG/DL — SIGNIFICANT CHANGE UP (ref 0.2–1)
WBC # BLD: 9.11 K/UL — SIGNIFICANT CHANGE UP (ref 3.8–10.5)
WBC # BLD: 9.11 K/UL — SIGNIFICANT CHANGE UP (ref 3.8–10.5)
WBC # FLD AUTO: 9.11 K/UL — SIGNIFICANT CHANGE UP (ref 3.8–10.5)
WBC # FLD AUTO: 9.11 K/UL — SIGNIFICANT CHANGE UP (ref 3.8–10.5)
WBC UR QL: 1 /HPF — SIGNIFICANT CHANGE UP (ref 0–5)
WBC UR QL: 1 /HPF — SIGNIFICANT CHANGE UP (ref 0–5)

## 2023-11-07 PROCEDURE — 99285 EMERGENCY DEPT VISIT HI MDM: CPT | Mod: 25

## 2023-11-07 PROCEDURE — 82550 ASSAY OF CK (CPK): CPT

## 2023-11-07 PROCEDURE — 93010 ELECTROCARDIOGRAM REPORT: CPT

## 2023-11-07 PROCEDURE — 85025 COMPLETE CBC W/AUTO DIFF WBC: CPT

## 2023-11-07 PROCEDURE — 85379 FIBRIN DEGRADATION QUANT: CPT

## 2023-11-07 PROCEDURE — 93005 ELECTROCARDIOGRAM TRACING: CPT

## 2023-11-07 PROCEDURE — 85610 PROTHROMBIN TIME: CPT

## 2023-11-07 PROCEDURE — 71045 X-RAY EXAM CHEST 1 VIEW: CPT

## 2023-11-07 PROCEDURE — 84484 ASSAY OF TROPONIN QUANT: CPT

## 2023-11-07 PROCEDURE — 80053 COMPREHEN METABOLIC PANEL: CPT

## 2023-11-07 PROCEDURE — 85730 THROMBOPLASTIN TIME PARTIAL: CPT

## 2023-11-07 PROCEDURE — 99285 EMERGENCY DEPT VISIT HI MDM: CPT

## 2023-11-07 PROCEDURE — 83880 ASSAY OF NATRIURETIC PEPTIDE: CPT

## 2023-11-07 PROCEDURE — 93971 EXTREMITY STUDY: CPT | Mod: 26,RT

## 2023-11-07 PROCEDURE — 81001 URINALYSIS AUTO W/SCOPE: CPT

## 2023-11-07 PROCEDURE — 71045 X-RAY EXAM CHEST 1 VIEW: CPT | Mod: 26

## 2023-11-07 PROCEDURE — 83735 ASSAY OF MAGNESIUM: CPT

## 2023-11-07 PROCEDURE — 36415 COLL VENOUS BLD VENIPUNCTURE: CPT

## 2023-11-07 PROCEDURE — 93971 EXTREMITY STUDY: CPT | Mod: RT

## 2023-11-07 RX ORDER — SODIUM CHLORIDE 9 MG/ML
1000 INJECTION INTRAMUSCULAR; INTRAVENOUS; SUBCUTANEOUS ONCE
Refills: 0 | Status: COMPLETED | OUTPATIENT
Start: 2023-11-07 | End: 2023-11-07

## 2023-11-07 RX ADMIN — SODIUM CHLORIDE 1000 MILLILITER(S): 9 INJECTION INTRAMUSCULAR; INTRAVENOUS; SUBCUTANEOUS at 03:45

## 2023-11-07 NOTE — ED PROVIDER NOTE - CLINICAL SUMMARY MEDICAL DECISION MAKING FREE TEXT BOX
well-appearing patient presenting with episode of paresthesias, palpitations, lightheadedness, sweats, lasting about 30 minutes.  Symptoms improved upon arrival.  History of right ankle surgery, complaining of tightness and discomfort to the right calf while going to the gym recently.  Plan for cardiac monitoring, cardiac work-up including troponin and D-dimer, right lower extremity duplex, may need CTA of the chest, will reassess well-appearing patient presenting with episode of paresthesias, palpitations, lightheadedness, sweats, lasting about 30 minutes.  Symptoms improved upon arrival.  History of right ankle surgery, complaining of tightness and discomfort to the right calf while going to the gym recently.  Plan for cardiac monitoring, cardiac work-up including troponin and D-dimer, right lower extremity duplex, may need CTA of the chest, will reassess  -Labs unremarkable, trop neg x2, d-dimer neg. Duplex neg. Mildly elevated CPK, no hematuria, nl renal function, no signs of rhabdomyolysis. CXR with ?enlarged heart border by my interp, bedside US performed by self shows no pericardial effusion, no D sign, grossly nl LV function without obvious LVH or outflow tract obstruction. Sx fully resolved and pt feeling back to baseline. Stable for DC with fu with his cardiologist, return precautions discussed

## 2023-11-07 NOTE — ED PROVIDER NOTE - PHYSICAL EXAMINATION
General: AAOx3, NAD  HEENT: NCAT  Cardiac: Normal rate and rhythym, no murmurs, normal peripheral perfusion  Respiratory: Normal rate and effort. CTAB  GI: Soft, nondistended, nontender  Neuro: No focal deficits. GAMBLE equally x4, sensation to light touch intact throughout  MSK: FROMx4, no focal bony tenderness, no peripheral edema  Skin: No rash

## 2023-11-07 NOTE — ED PROVIDER NOTE - PATIENT PORTAL LINK FT
You can access the FollowMyHealth Patient Portal offered by [facility] by registering at the following website: [email or web address]. By joining [employer]’s FollowMyHealth portal, you will also be able to view your health information using other applications (apps) compatible with our system.

## 2023-11-07 NOTE — ED ADULT NURSE NOTE - OBJECTIVE STATEMENT
28 y/o male presents to ED after having an episode of "pins and needles" along his upper back, sweats, lightheadedness, and heart racing while playing video games. Pt states that it lasted for about 30 minutes and has relieved since then. Pt denies chest pain, SOB, N/V/D, being sick recently. Pt on tele monitor, NSR in 70s. PMHx asthma, eczema, mitral valve cleft, VSD, intercranial HTN. Safety measures in place, stretcher locked and in lowest position.

## 2023-11-07 NOTE — ED ADULT TRIAGE NOTE - CHIEF COMPLAINT QUOTE
pt ambulatory to triage c/o palpitations, cold sweats, tingling down back, lightheadedness x1 hour pta. -allergies. pmh asthma, eczema, mitral valve cleft, VSD, intercranial HTN

## 2023-11-07 NOTE — ED PROVIDER NOTE - OBJECTIVE STATEMENT
27-year-old male history of asthma, eczema, mitral valve cleft, intracranial hypertension presents with episode of palpitations, paresthesias.  Patient reports she was at rest playing video games, felt sudden tingling sensation behind the right shoulder radiating to the posterior left shoulder blade area.  He then felt lightheadedness, sweats, and heart palpitations.  Symptoms lasted about 20 to 30 minutes and have improved since arrival.  Also reports she has been feeling tightness in the right calf, had surgery to the right ankle about 5 to 6 months ago.  Currently denies any chest pain or shortness of breath

## 2023-11-07 NOTE — ED PROVIDER NOTE - NSFOLLOWUPINSTRUCTIONS_ED_ALL_ED_FT
Return to the Emergency Department for worsening or persistent symptoms, and/or ANY NEW OR CONCERNING SYMPTOMS. If you have issues obtaining follow up, please call: [phone] or [phone]  to obtain a doctor or specialist who takes your insurance in your area.    Heart Palpitations    WHAT YOU NEED TO KNOW:    Heart palpitations are feelings that your heart races, jumps, throbs, or flutters. You may feel extra beats, no beats for a short time, or skipped beats. You may have these feelings in your chest, throat, or neck. They may happen when you are sitting, standing, or lying. Heart palpitations may be frightening, but are usually not caused by a serious problem.     DISCHARGE INSTRUCTIONS:    Call 911 or have someone else call for any of the following:     You have any of the following signs of a heart attack:   Squeezing, pressure, or pain in your chest       and any of the following:   Discomfort or pain in your back, neck, jaw, stomach, or arm       Shortness of breath      Nausea or vomiting      Lightheadedness or a sudden cold sweat      You have any of the following signs of a stroke:   Numbness or drooping on one side of your face       Weakness in an arm or leg      Confusion or difficulty speaking      Dizziness, a severe headache, or vision loss      You faint or lose consciousness.     Return to the emergency department if:     Your palpitations happen more often or get more intense.         Contact your healthcare provider if:     You have new or worsening swelling in your feet or ankles.      You have questions or concerns about your condition or care.    Follow up with your healthcare provider as directed: You may need to follow up with a cardiologist. You may need tests to check for heart problems that cause palpitations. Write down your questions so you remember to ask them during your visits.     Keep a record: Write down when your palpitations start and stop, what you were doing when they started, and your symptoms. Keep track of what you ate or drank within a few hours of your palpitations. Include anything that seemed to help your symptoms, such as lying down or holding your breath. This record will help you and your healthcare provider learn what triggers your palpitations. Bring this record with you to your follow up visits.    Help prevent heart palpitations:     Manage stress and anxiety. Find ways to relax such as listening to music, meditating, or doing yoga. Exercise can also help decrease stress and anxiety. Talk to someone you trust about your stress or anxiety. You can also talk to a therapist.       Get plenty of sleep every night. Ask your healthcare provider how much sleep you need each night.       Do not drink caffeine or alcohol. Caffeine and alcohol can make your palpitations worse. Caffeine is found in soda, coffee, tea, chocolate, and drinks that increase your energy.       Do not smoke. Nicotine and other chemicals in cigarettes and cigars may damage your heart and blood vessels. Ask your healthcare provider for information if you currently smoke and need help to quit. E-cigarettes or smokeless tobacco still contain nicotine. Talk to your healthcare provider before you use these products.       Do not use illegal drugs. Talk to your healthcare provider if you use illegal drugs and want help to quit.

## 2023-11-07 NOTE — ED ADULT NURSE NOTE - NSFALLRISKINTERV_ED_ALL_ED

## 2023-12-20 ENCOUNTER — APPOINTMENT (OUTPATIENT)
Dept: MRI IMAGING | Facility: CLINIC | Age: 28
End: 2023-12-20

## 2023-12-22 ENCOUNTER — NON-APPOINTMENT (OUTPATIENT)
Age: 28
End: 2023-12-22

## 2024-01-03 ENCOUNTER — OFFICE (OUTPATIENT)
Dept: URBAN - METROPOLITAN AREA CLINIC 115 | Facility: CLINIC | Age: 29
Setting detail: OPHTHALMOLOGY
End: 2024-01-03
Payer: COMMERCIAL

## 2024-01-03 DIAGNOSIS — H52.03: ICD-10-CM

## 2024-01-03 DIAGNOSIS — G93.2: ICD-10-CM

## 2024-01-03 DIAGNOSIS — H53.433: ICD-10-CM

## 2024-01-03 DIAGNOSIS — H47.11: ICD-10-CM

## 2024-01-03 PROCEDURE — 92133 CPTRZD OPH DX IMG PST SGM ON: CPT | Performed by: OPHTHALMOLOGY

## 2024-01-03 PROCEDURE — 92083 EXTENDED VISUAL FIELD XM: CPT | Performed by: OPHTHALMOLOGY

## 2024-01-03 PROCEDURE — 92015 DETERMINE REFRACTIVE STATE: CPT | Performed by: OPHTHALMOLOGY

## 2024-01-03 PROCEDURE — 92004 COMPRE OPH EXAM NEW PT 1/>: CPT | Performed by: OPHTHALMOLOGY

## 2024-01-03 ASSESSMENT — SPHEQUIV_DERIVED
OS_SPHEQUIV: 0.375
OS_SPHEQUIV: 0
OD_SPHEQUIV: 0
OD_SPHEQUIV: 0.125

## 2024-01-03 ASSESSMENT — REFRACTION_MANIFEST
OS_CYLINDER: -1.00
OD_CYLINDER: -0.25
OS_SPHERE: +0.50
OD_VA1: 20/25+2
OS_AXIS: 120
OD_AXIS: 145
OD_SPHERE: +0.25
OS_VA1: 20/25

## 2024-01-03 ASSESSMENT — REFRACTION_AUTOREFRACTION
OD_AXIS: 161
OD_CYLINDER: -0.50
OS_SPHERE: +1.00
OD_SPHERE: +0.25
OS_AXIS: 109
OS_CYLINDER: -1.25

## 2024-01-03 ASSESSMENT — CONFRONTATIONAL VISUAL FIELD TEST (CVF)
OD_FINDINGS: FULL
OS_FINDINGS: FULL

## 2024-02-14 ENCOUNTER — OFFICE (OUTPATIENT)
Dept: URBAN - METROPOLITAN AREA CLINIC 115 | Facility: CLINIC | Age: 29
Setting detail: OPHTHALMOLOGY
End: 2024-02-14
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
## 2024-02-14 PROCEDURE — NO SHOW FE NO SHOW FEE: Performed by: OPHTHALMOLOGY

## 2024-03-16 ENCOUNTER — NON-APPOINTMENT (OUTPATIENT)
Age: 29
End: 2024-03-16

## 2024-03-22 ENCOUNTER — NON-APPOINTMENT (OUTPATIENT)
Age: 29
End: 2024-03-22

## 2024-03-23 ENCOUNTER — OFFICE (OUTPATIENT)
Dept: URBAN - METROPOLITAN AREA CLINIC 115 | Facility: CLINIC | Age: 29
Setting detail: OPHTHALMOLOGY
End: 2024-03-23
Payer: COMMERCIAL

## 2024-03-23 DIAGNOSIS — G93.2: ICD-10-CM

## 2024-03-23 DIAGNOSIS — H47.11: ICD-10-CM

## 2024-03-23 DIAGNOSIS — H53.433: ICD-10-CM

## 2024-03-23 PROCEDURE — 92133 CPTRZD OPH DX IMG PST SGM ON: CPT | Performed by: OPHTHALMOLOGY

## 2024-03-23 PROCEDURE — 92012 INTRM OPH EXAM EST PATIENT: CPT | Performed by: OPHTHALMOLOGY

## 2024-03-23 PROCEDURE — 92083 EXTENDED VISUAL FIELD XM: CPT | Performed by: OPHTHALMOLOGY

## 2024-03-23 ASSESSMENT — REFRACTION_MANIFEST
OS_VA1: 20/25
OS_AXIS: 120
OS_SPHERE: +0.50
OD_CYLINDER: -0.25
OS_CYLINDER: -1.00
OD_VA1: 20/25+2
OD_AXIS: 145
OD_SPHERE: +0.25

## 2024-03-23 ASSESSMENT — SPHEQUIV_DERIVED
OD_SPHEQUIV: 0.125
OS_SPHEQUIV: 0

## 2024-04-29 NOTE — ED STATDOCS - PHYSICAL EXAMINATION
1
PA NOTE: GEN: AOX3, NAD. HEENT: Throat clear. Airway is patent. EYES: PERRLA. EOMI. Head: NC/AT. NECK: Supple, No JVD. FROM. C-spine non-tender. CV:S1S2, RRR, LUNGS: Non-labored breathing, no tachypnea. O2sat 100% RA. CTA b/l. No w/r/r. CHEST: Equal chest expansion and rise. No deformity. ABD: Soft, NT/ND, no rebound, no guarding. No CVAT. EXT: No e/c/c. 2+ distal pulses. SKIN: No rashes. NEURO: No focal deficits. CN II-XII intact. FROM. 5/5 motor and sensory. ~Cheikh Ball PA-C

## 2024-05-25 NOTE — DISCHARGE NOTE ADULT - PROVIDER TOKENS
Patient resting comfortably in bed.  Pain adequately controlled.        Knee immobilizer in place.  Negative Homans.  Dressing with minimal strikethrough.  Intact to sensory and motor distally.        Assessment: Postoperative day #2 status post open reduction internal fixation periprosthetic fracture left femoral shaft        Plan:  Continue current orthopedic care  Strict nonweightbearing to the left lower extremity  Knee immobilizer at all times  Okay to resume Lovenox today from an orthopedic perspective  Discharge planning with plans for discharge to skilled facility   TOKEN:'[id number]',FREE:[LAST:[Green],FIRST:[],PHONE:[(   )    -],FAX:[(   )    -]]

## 2024-06-02 ENCOUNTER — NON-APPOINTMENT (OUTPATIENT)
Age: 29
End: 2024-06-02

## 2024-06-17 NOTE — ED ADULT NURSE NOTE - BREATHING, MLM
Medication:   Requested Prescriptions     Pending Prescriptions Disp Refills    amphetamine-dextroamphetamine (ADDERALL, 15MG,) 15 MG tablet 30 tablet 0     Sig: Take 1 tablet by mouth daily for 30 days. Max Daily Amount: 15 mg    amphetamine-dextroamphetamine (ADDERALL, 10MG,) 10 MG tablet 30 tablet 0     Sig: Take 1 tablet by mouth Daily with lunch for 30 days. Max Daily Amount: 10 mg        Last Filled:  05/16/24    Patient Phone Number: [phone] (home)     Last appt: 4/19/2024 Return in about 6 months (around 10/19/2024) for Annual Wellness, ADD/ADHD.   Next appt: Visit date not found    Last OARRS:        No data to display                  
Spontaneous, unlabored and symmetrical

## 2024-07-28 ENCOUNTER — NON-APPOINTMENT (OUTPATIENT)
Age: 29
End: 2024-07-28

## 2024-08-21 NOTE — ED PROVIDER NOTE - CARDIAC, MLM
Normal rate, regular rhythm.  Heart sounds S1, S2.  No murmurs, rubs or gallops.
end ileostomy, partial proctectomy

## 2024-09-11 ENCOUNTER — NON-APPOINTMENT (OUTPATIENT)
Age: 29
End: 2024-09-11

## 2024-10-09 ENCOUNTER — NON-APPOINTMENT (OUTPATIENT)
Age: 29
End: 2024-10-09

## 2024-10-15 ENCOUNTER — NON-APPOINTMENT (OUTPATIENT)
Age: 29
End: 2024-10-15

## 2024-10-17 ENCOUNTER — EMERGENCY (EMERGENCY)
Facility: HOSPITAL | Age: 29
LOS: 0 days | Discharge: HOME | End: 2024-10-17
Attending: EMERGENCY MEDICINE
Payer: COMMERCIAL

## 2024-10-17 VITALS
SYSTOLIC BLOOD PRESSURE: 123 MMHG | OXYGEN SATURATION: 97 % | RESPIRATION RATE: 20 BRPM | TEMPERATURE: 99 F | DIASTOLIC BLOOD PRESSURE: 68 MMHG | HEART RATE: 93 BPM

## 2024-10-17 VITALS — HEIGHT: 66 IN

## 2024-10-17 DIAGNOSIS — J45.909 UNSPECIFIED ASTHMA, UNCOMPLICATED: ICD-10-CM

## 2024-10-17 DIAGNOSIS — Z98.890 OTHER SPECIFIED POSTPROCEDURAL STATES: Chronic | ICD-10-CM

## 2024-10-17 DIAGNOSIS — R06.02 SHORTNESS OF BREATH: ICD-10-CM

## 2024-10-17 DIAGNOSIS — Z91.048 OTHER NONMEDICINAL SUBSTANCE ALLERGY STATUS: ICD-10-CM

## 2024-10-17 DIAGNOSIS — G47.33 OBSTRUCTIVE SLEEP APNEA (ADULT) (PEDIATRIC): Chronic | ICD-10-CM

## 2024-10-17 LAB
ALBUMIN SERPL ELPH-MCNC: 3.5 G/DL — SIGNIFICANT CHANGE UP (ref 3.3–5)
ALP SERPL-CCNC: 77 U/L — SIGNIFICANT CHANGE UP (ref 40–120)
ALT FLD-CCNC: 43 U/L — SIGNIFICANT CHANGE UP (ref 12–78)
ANION GAP SERPL CALC-SCNC: 5 MMOL/L — SIGNIFICANT CHANGE UP (ref 5–17)
APTT BLD: 29.5 SEC — SIGNIFICANT CHANGE UP (ref 24.5–35.6)
AST SERPL-CCNC: 12 U/L — LOW (ref 15–37)
BASOPHILS # BLD AUTO: 0.11 K/UL — SIGNIFICANT CHANGE UP (ref 0–0.2)
BASOPHILS NFR BLD AUTO: 0.8 % — SIGNIFICANT CHANGE UP (ref 0–2)
BILIRUB SERPL-MCNC: 0.4 MG/DL — SIGNIFICANT CHANGE UP (ref 0.2–1.2)
BUN SERPL-MCNC: 16 MG/DL — SIGNIFICANT CHANGE UP (ref 7–23)
CALCIUM SERPL-MCNC: 9.3 MG/DL — SIGNIFICANT CHANGE UP (ref 8.5–10.1)
CHLORIDE SERPL-SCNC: 110 MMOL/L — HIGH (ref 96–108)
CO2 SERPL-SCNC: 24 MMOL/L — SIGNIFICANT CHANGE UP (ref 22–31)
CREAT SERPL-MCNC: 1.06 MG/DL — SIGNIFICANT CHANGE UP (ref 0.5–1.3)
EGFR: 97 ML/MIN/1.73M2 — SIGNIFICANT CHANGE UP
EOSINOPHIL # BLD AUTO: 0.2 K/UL — SIGNIFICANT CHANGE UP (ref 0–0.5)
EOSINOPHIL NFR BLD AUTO: 1.5 % — SIGNIFICANT CHANGE UP (ref 0–6)
FLUAV AG NPH QL: SIGNIFICANT CHANGE UP
FLUBV AG NPH QL: SIGNIFICANT CHANGE UP
GLUCOSE SERPL-MCNC: 93 MG/DL — SIGNIFICANT CHANGE UP (ref 70–99)
HCT VFR BLD CALC: 42.4 % — SIGNIFICANT CHANGE UP (ref 39–50)
HGB BLD-MCNC: 13.9 G/DL — SIGNIFICANT CHANGE UP (ref 13–17)
IMM GRANULOCYTES NFR BLD AUTO: 0.7 % — SIGNIFICANT CHANGE UP (ref 0–0.9)
INR BLD: 0.95 RATIO — SIGNIFICANT CHANGE UP (ref 0.85–1.16)
LYMPHOCYTES # BLD AUTO: 1.57 K/UL — SIGNIFICANT CHANGE UP (ref 1–3.3)
LYMPHOCYTES # BLD AUTO: 12 % — LOW (ref 13–44)
MCHC RBC-ENTMCNC: 29.5 PG — SIGNIFICANT CHANGE UP (ref 27–34)
MCHC RBC-ENTMCNC: 32.8 GM/DL — SIGNIFICANT CHANGE UP (ref 32–36)
MCV RBC AUTO: 90 FL — SIGNIFICANT CHANGE UP (ref 80–100)
MONOCYTES # BLD AUTO: 1.18 K/UL — HIGH (ref 0–0.9)
MONOCYTES NFR BLD AUTO: 9 % — SIGNIFICANT CHANGE UP (ref 2–14)
NEUTROPHILS # BLD AUTO: 9.98 K/UL — HIGH (ref 1.8–7.4)
NEUTROPHILS NFR BLD AUTO: 76 % — SIGNIFICANT CHANGE UP (ref 43–77)
PLATELET # BLD AUTO: 295 K/UL — SIGNIFICANT CHANGE UP (ref 150–400)
POTASSIUM SERPL-MCNC: 4 MMOL/L — SIGNIFICANT CHANGE UP (ref 3.5–5.3)
POTASSIUM SERPL-SCNC: 4 MMOL/L — SIGNIFICANT CHANGE UP (ref 3.5–5.3)
PROT SERPL-MCNC: 7.5 GM/DL — SIGNIFICANT CHANGE UP (ref 6–8.3)
PROTHROM AB SERPL-ACNC: 11.2 SEC — SIGNIFICANT CHANGE UP (ref 9.9–13.4)
RBC # BLD: 4.71 M/UL — SIGNIFICANT CHANGE UP (ref 4.2–5.8)
RBC # FLD: 14.9 % — HIGH (ref 10.3–14.5)
RSV RNA NPH QL NAA+NON-PROBE: SIGNIFICANT CHANGE UP
SARS-COV-2 RNA SPEC QL NAA+PROBE: SIGNIFICANT CHANGE UP
SODIUM SERPL-SCNC: 139 MMOL/L — SIGNIFICANT CHANGE UP (ref 135–145)
WBC # BLD: 13.13 K/UL — HIGH (ref 3.8–10.5)
WBC # FLD AUTO: 13.13 K/UL — HIGH (ref 3.8–10.5)

## 2024-10-17 PROCEDURE — 85025 COMPLETE CBC W/AUTO DIFF WBC: CPT

## 2024-10-17 PROCEDURE — 99285 EMERGENCY DEPT VISIT HI MDM: CPT | Mod: 25

## 2024-10-17 PROCEDURE — 36415 COLL VENOUS BLD VENIPUNCTURE: CPT

## 2024-10-17 PROCEDURE — 85730 THROMBOPLASTIN TIME PARTIAL: CPT

## 2024-10-17 PROCEDURE — 93005 ELECTROCARDIOGRAM TRACING: CPT

## 2024-10-17 PROCEDURE — 0241U: CPT

## 2024-10-17 PROCEDURE — 80053 COMPREHEN METABOLIC PANEL: CPT

## 2024-10-17 PROCEDURE — 85610 PROTHROMBIN TIME: CPT

## 2024-10-17 PROCEDURE — 71046 X-RAY EXAM CHEST 2 VIEWS: CPT | Mod: 26

## 2024-10-17 PROCEDURE — 96374 THER/PROPH/DIAG INJ IV PUSH: CPT

## 2024-10-17 PROCEDURE — 71046 X-RAY EXAM CHEST 2 VIEWS: CPT

## 2024-10-17 PROCEDURE — 93010 ELECTROCARDIOGRAM REPORT: CPT

## 2024-10-17 PROCEDURE — 99284 EMERGENCY DEPT VISIT MOD MDM: CPT

## 2024-10-17 PROCEDURE — 94640 AIRWAY INHALATION TREATMENT: CPT

## 2024-10-17 RX ORDER — MAGNESIUM SULFATE 500 MG/ML
2 VIAL (ML) INJECTION ONCE
Refills: 0 | Status: COMPLETED | OUTPATIENT
Start: 2024-10-17 | End: 2024-10-17

## 2024-10-17 RX ORDER — METHYLPREDNISOLONE ACETATE 80 MG/ML
125 INJECTION, SUSPENSION INTRA-ARTICULAR; INTRALESIONAL; INTRAMUSCULAR; SOFT TISSUE ONCE
Refills: 0 | Status: COMPLETED | OUTPATIENT
Start: 2024-10-17 | End: 2024-10-17

## 2024-10-17 RX ORDER — IPRATROPIUM BROMIDE AND ALBUTEROL SULFATE .5; 3 MG/3ML; MG/3ML
3 SOLUTION RESPIRATORY (INHALATION)
Refills: 0 | Status: COMPLETED | OUTPATIENT
Start: 2024-10-17 | End: 2024-10-17

## 2024-10-17 RX ORDER — PREDNISONE 5 MG/1
3 TABLET ORAL
Qty: 15 | Refills: 0
Start: 2024-10-17 | End: 2024-10-21

## 2024-10-17 RX ADMIN — METHYLPREDNISOLONE ACETATE 125 MILLIGRAM(S): 80 INJECTION, SUSPENSION INTRA-ARTICULAR; INTRALESIONAL; INTRAMUSCULAR; SOFT TISSUE at 14:12

## 2024-10-17 RX ADMIN — Medication 150 GRAM(S): at 16:07

## 2024-10-17 RX ADMIN — IPRATROPIUM BROMIDE AND ALBUTEROL SULFATE 3 MILLILITER(S): .5; 3 SOLUTION RESPIRATORY (INHALATION) at 15:05

## 2024-10-17 RX ADMIN — IPRATROPIUM BROMIDE AND ALBUTEROL SULFATE 3 MILLILITER(S): .5; 3 SOLUTION RESPIRATORY (INHALATION) at 16:08

## 2024-10-17 RX ADMIN — IPRATROPIUM BROMIDE AND ALBUTEROL SULFATE 3 MILLILITER(S): .5; 3 SOLUTION RESPIRATORY (INHALATION) at 14:12

## 2024-10-17 NOTE — ED ADULT TRIAGE NOTE - MODE OF ARRIVAL
[employer] Walk in Patient presents to ED secondary to witnessed seizure by girlfriend in car did not hit head patient also admits to sensation of alcohol use on Tuesday abruptly trying to quit alcohol prior Daily drinker of a pint of alcohol per day.  MI evaluation patient states he is anxious sweaty overall does not feel well.  Denies headache or vision changes.  No chest pain or shortness of breath.  No nausea vomiting diarrhea.  Denies any other new drug abuse.  Positive tongue abrasion

## 2024-10-17 NOTE — ED STATDOCS - PROGRESS NOTE DETAILS
PA note: All labwork/radiology results discussed in detail with patient. Patient re-examined and re-evaluated. Patient feels much better at this time. ED evaluation, Diagnosis and management discussed with the patient in detail. Workup results discussed with ED attending, OK to dc home. Close PMD follow up encouraged, aftercare to assist with scheduling appointment ASAP. Strict ED return instructions discussed in detail and patient given the opportunity to ask any questions about their discharge diagnosis and instructions. Patient is on day 3 of 10-day course of Amoxicillin and Medrol DP, will continue Amoxicillin, switched Medrol to oral pred 60 x 5days. Patient verbalized understanding. ~ Cheikh Ball PA-C 27 y/o male with a PMHx of asthma presents to the ED c/o SOB x1 week. Pt using neb at home. Pt went to urgent care 1 week ago and was given 3 days of Prednisone. Pt followed up again 3 days ago and was placed on Medrol dose pack. Denies fevers, chills. No other complaints at this time.

## 2024-10-17 NOTE — ED STATDOCS - PHYSICAL EXAMINATION
PA NOTE: GEN: AOX3, NAD. HEENT: Throat clear. Airway is patent. EYES: PERRLA. EOMI. Head: NC/AT. NECK: Supple, No JVD. FROM. C-spine non-tender. CV:S1S2, RRR, LUNGS: +Scattered wheezing. Non-labored breathing, no tachypnea. O2sat 100% RA. CHEST: Equal chest expansion and rise. No deformity. ABD: Soft, NT/ND, no rebound, no guarding. No CVAT. EXT: No e/c/c. 2+ distal pulses. SKIN: No rashes. NEURO: No focal deficits. CN II-XII intact. FROM. 5/5 motor and sensory. ~Cheikh Ball PA-C

## 2024-10-17 NOTE — ED ADULT TRIAGE NOTE - CHIEF COMPLAINT QUOTE
Pt c/o asthma exacerbation. On methylprednisolone from walk in clinic. - allergies. Pt A&ox4, ambulatory, no s/s of distress. Stat ekg Pt c/o asthma exacerbation. On methylprednisolone from walk in clinic. - allergies. Pt A&ox4, ambulatory, no s/s of distress. Stat ekg. Last asthma exacerbation x 2-3 years ago

## 2024-10-17 NOTE — ED STATDOCS - PATIENT PORTAL LINK FT
You can access the FollowMyHealth Patient Portal offered by [facility] by registering at the following website: [email or web address]. By joining [employer]’s FollowMyHealth portal, you will also be able to view your health information using other applications (apps) compatible with our system.

## 2024-10-17 NOTE — ED ADULT NURSE NOTE - NSFALLRISKINTERV_ED_ALL_ED

## 2024-10-17 NOTE — ED ADULT NURSE NOTE - CHIEF COMPLAINT QUOTE
Pt c/o asthma exacerbation. On methylprednisolone from walk in clinic. - allergies. Pt A&ox4, ambulatory, no s/s of distress. Stat ekg. Last asthma exacerbation x 2-3 years ago

## 2024-10-17 NOTE — ED STATDOCS - OBJECTIVE STATEMENT
29 y/o male with a PMHx of asthma presents to the ED c/o SOB x1 week. Pt using neb at home. Pt went to urgent care 1 week ago and was given 3 days of Prednisone. Pt followed up again 3 days ago and was placed on Medrol dose pack. Denies fevers, chills. No other complaints at this time.

## 2024-10-17 NOTE — ED ADULT NURSE NOTE - OBJECTIVE STATEMENT
Patient having increasing coughing and shortness of breath on prednisone prescribed on Wednesday which he completed on Sunday and he is on second round of steroids day 3 with no relief at this time.  Patient coughing up large amounts of yellow phlegms.  NO fever, no chills as per patient.

## 2024-10-17 NOTE — ED STATDOCS - ATTENDING APP SHARED VISIT CONTRIBUTION OF CARE
I,Vincenzo Wynn MD,  performed the initial face to face bedside interview with this patient regarding history of present illness, review of symptoms and relevant past medical, social and family history.  I completed an independent physical examination.  I was the initial provider who evaluated this patient. I have signed out the follow up of any pending tests (i.e. labs, radiological studies) to the ACP.  I have communicated the patient’s plan of care and disposition with the ACP.  The history, relevant review of systems, past medical and surgical history, medical decision making, and physical examination was documented by the scribe in my presence and I attest to the accuracy of the documentation.

## 2024-10-17 NOTE — ED STATDOCS - SOCIAL CONCERNS
He has stomach pain in the upper abdomen  The pain is constant but gets worse when he eats  Mom tried Pepto and MAALOX  He had stomach pain in the past  He has pain for 2 weeks now  NO CONSTIPATION OR Diarrhea, no fevers or vomiting  Told mom to keep his diet bland and have him drink water  No spicy, greasy foods  She took 445pm apt  Weds with Dr Bud Menard  None

## 2025-02-21 NOTE — ED PROVIDER NOTE - NS_EDPROVIDERDISPOUSERTYPE_ED_A_ED
[facility]      Patient is agreeable to these home care services and meets home bound criteria based on assessment done by Ordering Physician.   Choice was offered by care management and agrees with home care services ordered and patient chose [email or web address].    Service address and contact phone number verified.    Patient agrees to resume [facility] within 48 hours of hospital discharge.   The [facility] team will contact the patient to schedule next appointment.    Della Castro RN, BSN   [facility] Liaison  Phone: [phone]   Attending Attestation (For Attendings USE Only)... Attending Attestation (For Attendings USE Only).../Scribe Attestation (For Scribes USE Only)...

## 2025-04-09 NOTE — PATIENT PROFILE ADULT. - NS MD HP INPLANTS MED DEV
Initiate Treatment: Cerave dandruff shampoo and conditioner three x a week alternate with ketoconazole shampoo 2%. Detail Level: Zone Patent None

## 2025-06-16 NOTE — ED ADULT NURSE NOTE - PRIMARY CARE PROVIDER
What Type Of Note Output Would You Prefer (Optional)?: Bullet Format
What Is The Reason For Today's Visit?: Full Body Skin Examination
What Is The Reason For Today's Visit? (Being Monitored For X): concerning skin lesions on a periodic basis
Additional History: Spot on leg previously bx’d 12/2024, returned as Solar Lentigo but wants spots removed
NA

## 2025-07-11 ENCOUNTER — NON-APPOINTMENT (OUTPATIENT)
Age: 30
End: 2025-07-11